# Patient Record
Sex: MALE | Race: WHITE | NOT HISPANIC OR LATINO | Employment: OTHER | URBAN - METROPOLITAN AREA
[De-identification: names, ages, dates, MRNs, and addresses within clinical notes are randomized per-mention and may not be internally consistent; named-entity substitution may affect disease eponyms.]

---

## 2017-01-04 ENCOUNTER — APPOINTMENT (OUTPATIENT)
Dept: CARDIAC REHAB | Facility: HOSPITAL | Age: 62
End: 2017-01-04

## 2017-01-06 ENCOUNTER — APPOINTMENT (OUTPATIENT)
Dept: CARDIAC REHAB | Facility: HOSPITAL | Age: 62
End: 2017-01-06

## 2017-01-09 ENCOUNTER — OFFICE VISIT (OUTPATIENT)
Dept: CARDIAC REHAB | Facility: HOSPITAL | Age: 62
End: 2017-01-09

## 2017-01-09 ENCOUNTER — APPOINTMENT (OUTPATIENT)
Dept: CARDIAC REHAB | Facility: HOSPITAL | Age: 62
End: 2017-01-09

## 2017-01-09 DIAGNOSIS — I21.02 ST ELEVATION MYOCARDIAL INFARCTION INVOLVING LEFT ANTERIOR DESCENDING (LAD) CORONARY ARTERY (HCC): Primary | ICD-10-CM

## 2017-01-11 ENCOUNTER — APPOINTMENT (OUTPATIENT)
Dept: CARDIAC REHAB | Facility: HOSPITAL | Age: 62
End: 2017-01-11

## 2017-01-13 ENCOUNTER — OFFICE VISIT (OUTPATIENT)
Dept: CARDIAC REHAB | Facility: HOSPITAL | Age: 62
End: 2017-01-13

## 2017-01-13 ENCOUNTER — APPOINTMENT (OUTPATIENT)
Dept: CARDIAC REHAB | Facility: HOSPITAL | Age: 62
End: 2017-01-13

## 2017-01-13 DIAGNOSIS — I21.02 ST ELEVATION MYOCARDIAL INFARCTION INVOLVING LEFT ANTERIOR DESCENDING (LAD) CORONARY ARTERY (HCC): Primary | ICD-10-CM

## 2017-01-16 ENCOUNTER — APPOINTMENT (OUTPATIENT)
Dept: CARDIAC REHAB | Facility: HOSPITAL | Age: 62
End: 2017-01-16

## 2017-01-18 ENCOUNTER — OFFICE VISIT (OUTPATIENT)
Dept: CARDIAC REHAB | Facility: HOSPITAL | Age: 62
End: 2017-01-18

## 2017-01-18 ENCOUNTER — APPOINTMENT (OUTPATIENT)
Dept: CARDIAC REHAB | Facility: HOSPITAL | Age: 62
End: 2017-01-18

## 2017-01-18 DIAGNOSIS — I21.02 ST ELEVATION MYOCARDIAL INFARCTION INVOLVING LEFT ANTERIOR DESCENDING (LAD) CORONARY ARTERY (HCC): Primary | ICD-10-CM

## 2017-01-19 RX ORDER — METFORMIN HYDROCHLORIDE 500 MG/1
TABLET, EXTENDED RELEASE ORAL
Qty: 30 TABLET | Refills: 0 | Status: SHIPPED | OUTPATIENT
Start: 2017-01-19 | End: 2017-02-14 | Stop reason: SDUPTHER

## 2017-01-20 ENCOUNTER — APPOINTMENT (OUTPATIENT)
Dept: CARDIAC REHAB | Facility: HOSPITAL | Age: 62
End: 2017-01-20

## 2017-01-23 ENCOUNTER — OFFICE VISIT (OUTPATIENT)
Dept: CARDIAC REHAB | Facility: HOSPITAL | Age: 62
End: 2017-01-23

## 2017-01-23 ENCOUNTER — APPOINTMENT (OUTPATIENT)
Dept: CARDIAC REHAB | Facility: HOSPITAL | Age: 62
End: 2017-01-23

## 2017-01-23 DIAGNOSIS — I21.02 ST ELEVATION MYOCARDIAL INFARCTION INVOLVING LEFT ANTERIOR DESCENDING (LAD) CORONARY ARTERY (HCC): Primary | ICD-10-CM

## 2017-01-25 ENCOUNTER — OFFICE VISIT (OUTPATIENT)
Dept: INTERNAL MEDICINE | Facility: CLINIC | Age: 62
End: 2017-01-25

## 2017-01-25 ENCOUNTER — APPOINTMENT (OUTPATIENT)
Dept: CARDIAC REHAB | Facility: HOSPITAL | Age: 62
End: 2017-01-25

## 2017-01-25 VITALS
TEMPERATURE: 97 F | RESPIRATION RATE: 16 BRPM | SYSTOLIC BLOOD PRESSURE: 94 MMHG | BODY MASS INDEX: 42.23 KG/M2 | DIASTOLIC BLOOD PRESSURE: 68 MMHG | WEIGHT: 286 LBS | HEART RATE: 68 BPM

## 2017-01-25 DIAGNOSIS — E78.2 MIXED HYPERLIPIDEMIA: ICD-10-CM

## 2017-01-25 DIAGNOSIS — N18.2 CHRONIC KIDNEY DISEASE, STAGE 2 (MILD): ICD-10-CM

## 2017-01-25 DIAGNOSIS — I25.10 CORONARY ARTERY DISEASE INVOLVING NATIVE CORONARY ARTERY OF NATIVE HEART WITHOUT ANGINA PECTORIS: ICD-10-CM

## 2017-01-25 DIAGNOSIS — E11.9 TYPE 2 DIABETES MELLITUS WITHOUT COMPLICATION, WITHOUT LONG-TERM CURRENT USE OF INSULIN (HCC): ICD-10-CM

## 2017-01-25 DIAGNOSIS — I50.21 CHF (CONGESTIVE HEART FAILURE), NYHA CLASS I, ACUTE, SYSTOLIC (HCC): Primary | ICD-10-CM

## 2017-01-25 PROCEDURE — 99214 OFFICE O/P EST MOD 30 MIN: CPT | Performed by: FAMILY MEDICINE

## 2017-01-25 NOTE — MR AVS SNAPSHOT
Joshua Wolf   1/25/2017 9:45 AM   Office Visit    Dept Phone:  [phone]   Encounter #:  [id number]    Provider:  Eric Eubanks Jr., MD   Department:  [facility] INTERNAL MEDICINE                Your Full Care Plan              Your Updated Medication List          This list is accurate as of: 1/25/17 10:57 AM.  Always use your most recent med list.                amiodarone 200 MG tablet   Commonly known as:  PACERONE       aspirin 81 MG EC tablet       atorvastatin 40 MG tablet   Commonly known as:  LIPITOR       BRILINTA 90 MG tablet tablet   Generic drug:  ticagrelor       carvedilol 25 MG tablet   Commonly known as:  COREG       furosemide 40 MG tablet   Commonly known as:  LASIX       glimepiride 2 MG tablet   Commonly known as:  AMARYL   Take 1 tablet by mouth every morning before breakfast.       metFORMIN  MG 24 hr tablet   Commonly known as:  GLUCOPHAGE-XR   TAKE 1 TABLET BY MOUTH DAILY WITH BREAKFAST       potassium chloride 20 MEQ CR tablet   Commonly known as:  K-DUR,KLOR-CON       spironolactone 25 MG tablet   Commonly known as:  ALDACTONE       XARELTO 20 MG tablet   Generic drug:  rivaroxaban               You Were Diagnosed With        Codes Comments    CHF (congestive heart failure), NYHA class I, acute, systolic    -  Primary ICD-10-CM: I50.21  ICD-9-CM: 428.21, 428.0     Mixed hyperlipidemia     ICD-10-CM: E78.2  ICD-9-CM: 272.2     Coronary artery disease involving native coronary artery of native heart without angina pectoris     ICD-10-CM: I25.10  ICD-9-CM: 414.01     Type 2 diabetes mellitus without complication, without long-term current use of insulin     ICD-10-CM: E11.9  ICD-9-CM: 250.00     Chronic kidney disease, stage 2 (mild)     ICD-10-CM: N18.2  ICD-9-CM: 585.2       Instructions     None    Patient Instructions History      Upcoming Appointments     Visit Type Date Time Department    OFFICE VISIT 1/25/2017  9:45 AM [facility] 1  4003    PHASE III - CARD 1/25/2017  7:00 AM [facility]    PHASE III - CARD 1/27/2017  9:45 AM [facility]    PHASE III - CARD 1/30/2017  9:45 AM [facility]    PHASE III - CARD 2/1/2017  9:45 AM [facility]    PHASE III - CARD 2/3/2017  9:45 AM [facility]    PHASE III - CARD 2/6/2017  9:45 AM [facility]    PHASE III - CARD 2/8/2017  9:45 AM [facility]    PHASE III - CARD 2/10/2017  9:45 AM [facility]    PHASE III - CARD 2/13/2017  9:45 AM [facility]    PHASE III - CARD 2/15/2017  9:45 AM [facility]    PHASE III - CARD 2/17/2017  9:45 AM [facility]    PHASE III - CARD 2/20/2017  9:45 AM [facility]    PHASE III - CARD 2/22/2017  9:45 AM [facility]    PHASE III - CARD 2/24/2017  9:45 AM [facility]    PHASE III - CARD 2/27/2017  9:45 AM [facility]    PHASE III - CARD 3/1/2017  9:45 AM [facility]    PHASE III - CARD 3/3/2017  9:45 AM [facility]    PHASE III - CARD 3/6/2017  9:45 AM [facility]    PHASE III - CARD 3/8/2017  9:45 AM [facility]    PHASE III - CARD 3/10/2017  9:45 AM [facility]    PHASE III - CARD 3/13/2017  9:45 AM [facility]    PHASE III - CARD 3/15/2017  9:45 AM [facility]    PHASE III - CARD 3/17/2017  9:45 AM [facility]    OFFICE VISIT 7/26/2017 10:00 AM [facility] 1 4003      MyChart Signup     Our records indicate that you have declined [facility] MyChart signup. If you would like to sign up for MyChart, please email [email or web address] or call [phone] to obtain an activation code.             Other Info from Your Visit           Your Appointments     Jan 27, 2017  9:45 AM EST   PHASE III CARDIAC REHAB with GYM - CARDIAC REHAB   [facility] ([facility]    [address] [address]   [phone]            Jan 30, 2017  9:45 AM EST   PHASE III CARDIAC REHAB with GYM - CARDIAC REHAB    [facility] ([place])    [address] [address]   [phone]            Feb 01, 2017  9:45 AM EST   PHASE III CARDIAC REHAB with GYM - CARDIAC REHAB   [facility] ([place])    [address] [address]   [phone]            Feb 03, 2017  9:45 AM EST   PHASE III CARDIAC REHAB with GYM - CARDIAC REHAB   [facility] ([place])    [address] [address]   [phone]            Feb 06, 2017  9:45 AM EST   PHASE III CARDIAC REHAB with GYM - CARDIAC REHAB   [facility]    [address] [address]   [phone]              Allergies     No Known Allergies      Reason for Visit     Hypertension     Hyperlipidemia     Diabetes           Vital Signs     Blood Pressure Pulse Temperature Respirations Weight Body Mass Index    94/68 (BP Location: Left arm, Patient Position: Sitting, Cuff Size: Large Adult) 68 97 °F (36.1 °C) (Tympanic) 16 286 lb (130 kg) 42.23 kg/m2    Smoking Status                   Former Smoker           Problems and Diagnoses Noted     Heart failure    Coronary heart disease    High cholesterol or triglycerides    Type 2 diabetes mellitus without complication    Chronic kidney disease, stage II (mild)

## 2017-01-25 NOTE — PROGRESS NOTES
Subjective   Joshua Wolf is a 61 y.o. male.     Chief Complaint   Patient presents with   • Hypertension   • Hyperlipidemia   • Diabetes         History of Present Illness   Patient is here for recheck with history of type 2 diabetes obesity coronary artery disease with history of class I systolic CHF.  Is been stable on his current medications and feeling pretty good.  His A1c is less than 7.  He seen Dr. Hughes nephrology who is following his creatinine.  Consultation is reviewed.      The following portions of the patient's history were reviewed and updated as appropriate: allergies, current medications, past social history and problem list.    Review of Systems   Constitutional: Negative.    HENT: Negative.    Eyes: Negative.    Respiratory: Negative.    Cardiovascular: Negative.    Gastrointestinal: Negative.    Endocrine: Negative.    Genitourinary: Negative.    Musculoskeletal: Negative.    Skin: Negative.    Allergic/Immunologic: Negative.    Neurological: Negative.    Hematological: Negative.    Psychiatric/Behavioral: Negative.        Objective   Vitals:    01/25/17 1010   BP: 94/68   Pulse: 68   Resp: 16   Temp: 97 °F (36.1 °C)     Physical Exam   Constitutional: He is oriented to person, place, and time. He appears well-developed and well-nourished.   HENT:   Head: Normocephalic and atraumatic.   Right Ear: Tympanic membrane and external ear normal.   Left Ear: Tympanic membrane and external ear normal.   Nose: Nose normal.   Mouth/Throat: Oropharynx is clear and moist.   Eyes: Conjunctivae and EOM are normal. Pupils are equal, round, and reactive to light.   Neck: Normal range of motion. Neck supple. No JVD present. No thyromegaly present.   Cardiovascular: Normal rate, regular rhythm, normal heart sounds and intact distal pulses.    Pulmonary/Chest: Effort normal and breath sounds normal.   Abdominal: Soft. Bowel sounds are normal.   Musculoskeletal: Normal range of motion.   Lymphadenopathy:     He  has no cervical adenopathy.   Neurological: He is alert and oriented to person, place, and time. No cranial nerve deficit. Coordination normal.   Skin: Skin is warm and dry. No rash noted.   Psychiatric: He has a normal mood and affect. His behavior is normal. Judgment and thought content normal.   Vitals reviewed.      Assessment/Plan   Problem List Items Addressed This Visit        Cardiovascular and Mediastinum    CHF (congestive heart failure), NYHA class I - Primary    Hyperlipidemia    Coronary artery disease       Endocrine    Type 2 diabetes mellitus without complication      Other Visit Diagnoses     Chronic kidney disease, stage 2 (mild)           plan: Attempts at weight loss are discussed.  Medications are unchanged we'll see him back in July with repeat lab work.  Continue follow-up with cardiology Dr. figueredo

## 2017-01-27 ENCOUNTER — APPOINTMENT (OUTPATIENT)
Dept: CARDIAC REHAB | Facility: HOSPITAL | Age: 62
End: 2017-01-27

## 2017-01-30 ENCOUNTER — APPOINTMENT (OUTPATIENT)
Dept: CARDIAC REHAB | Facility: HOSPITAL | Age: 62
End: 2017-01-30

## 2017-01-30 ENCOUNTER — OFFICE VISIT (OUTPATIENT)
Dept: CARDIAC REHAB | Facility: HOSPITAL | Age: 62
End: 2017-01-30

## 2017-01-30 DIAGNOSIS — I21.02 ST ELEVATION MYOCARDIAL INFARCTION INVOLVING LEFT ANTERIOR DESCENDING (LAD) CORONARY ARTERY (HCC): Primary | ICD-10-CM

## 2017-02-01 ENCOUNTER — APPOINTMENT (OUTPATIENT)
Dept: CARDIAC REHAB | Facility: HOSPITAL | Age: 62
End: 2017-02-01

## 2017-02-03 ENCOUNTER — APPOINTMENT (OUTPATIENT)
Dept: CARDIAC REHAB | Facility: HOSPITAL | Age: 62
End: 2017-02-03

## 2017-02-06 ENCOUNTER — APPOINTMENT (OUTPATIENT)
Dept: CARDIAC REHAB | Facility: HOSPITAL | Age: 62
End: 2017-02-06

## 2017-02-08 ENCOUNTER — OFFICE VISIT (OUTPATIENT)
Dept: CARDIAC REHAB | Facility: HOSPITAL | Age: 62
End: 2017-02-08

## 2017-02-08 ENCOUNTER — APPOINTMENT (OUTPATIENT)
Dept: CARDIAC REHAB | Facility: HOSPITAL | Age: 62
End: 2017-02-08

## 2017-02-08 DIAGNOSIS — I21.02 ST ELEVATION MYOCARDIAL INFARCTION INVOLVING LEFT ANTERIOR DESCENDING (LAD) CORONARY ARTERY (HCC): Primary | ICD-10-CM

## 2017-02-10 ENCOUNTER — OFFICE VISIT (OUTPATIENT)
Dept: CARDIAC REHAB | Facility: HOSPITAL | Age: 62
End: 2017-02-10

## 2017-02-10 ENCOUNTER — APPOINTMENT (OUTPATIENT)
Dept: CARDIAC REHAB | Facility: HOSPITAL | Age: 62
End: 2017-02-10

## 2017-02-10 DIAGNOSIS — I21.02 ST ELEVATION MYOCARDIAL INFARCTION INVOLVING LEFT ANTERIOR DESCENDING (LAD) CORONARY ARTERY (HCC): Primary | ICD-10-CM

## 2017-02-13 ENCOUNTER — APPOINTMENT (OUTPATIENT)
Dept: CARDIAC REHAB | Facility: HOSPITAL | Age: 62
End: 2017-02-13

## 2017-02-14 RX ORDER — METFORMIN HYDROCHLORIDE 500 MG/1
TABLET, EXTENDED RELEASE ORAL
Qty: 30 TABLET | Refills: 0 | Status: SHIPPED | OUTPATIENT
Start: 2017-02-14 | End: 2017-03-17 | Stop reason: SDUPTHER

## 2017-02-15 ENCOUNTER — APPOINTMENT (OUTPATIENT)
Dept: CARDIAC REHAB | Facility: HOSPITAL | Age: 62
End: 2017-02-15

## 2017-02-17 ENCOUNTER — APPOINTMENT (OUTPATIENT)
Dept: CARDIAC REHAB | Facility: HOSPITAL | Age: 62
End: 2017-02-17

## 2017-02-20 ENCOUNTER — APPOINTMENT (OUTPATIENT)
Dept: CARDIAC REHAB | Facility: HOSPITAL | Age: 62
End: 2017-02-20

## 2017-02-20 ENCOUNTER — OFFICE VISIT (OUTPATIENT)
Dept: CARDIAC REHAB | Facility: HOSPITAL | Age: 62
End: 2017-02-20

## 2017-02-20 DIAGNOSIS — I21.02 ST ELEVATION MYOCARDIAL INFARCTION INVOLVING LEFT ANTERIOR DESCENDING (LAD) CORONARY ARTERY (HCC): Primary | ICD-10-CM

## 2017-02-22 ENCOUNTER — APPOINTMENT (OUTPATIENT)
Dept: CARDIAC REHAB | Facility: HOSPITAL | Age: 62
End: 2017-02-22

## 2017-02-24 ENCOUNTER — APPOINTMENT (OUTPATIENT)
Dept: CARDIAC REHAB | Facility: HOSPITAL | Age: 62
End: 2017-02-24

## 2017-02-27 ENCOUNTER — APPOINTMENT (OUTPATIENT)
Dept: CARDIAC REHAB | Facility: HOSPITAL | Age: 62
End: 2017-02-27

## 2017-03-06 ENCOUNTER — OFFICE VISIT (OUTPATIENT)
Dept: CARDIAC REHAB | Facility: HOSPITAL | Age: 62
End: 2017-03-06

## 2017-03-06 DIAGNOSIS — I21.02 ST ELEVATION MYOCARDIAL INFARCTION INVOLVING LEFT ANTERIOR DESCENDING (LAD) CORONARY ARTERY (HCC): Primary | ICD-10-CM

## 2017-03-12 ENCOUNTER — TRANSCRIBE ORDERS (OUTPATIENT)
Dept: CARDIAC REHAB | Facility: HOSPITAL | Age: 62
End: 2017-03-12

## 2017-03-12 DIAGNOSIS — I21.02 ST ELEVATION MYOCARDIAL INFARCTION INVOLVING LEFT ANTERIOR DESCENDING (LAD) CORONARY ARTERY (HCC): Primary | ICD-10-CM

## 2017-03-17 RX ORDER — METFORMIN HYDROCHLORIDE 500 MG/1
TABLET, EXTENDED RELEASE ORAL
Qty: 30 TABLET | Refills: 0 | Status: SHIPPED | OUTPATIENT
Start: 2017-03-17 | End: 2017-05-08 | Stop reason: SDUPTHER

## 2017-03-20 ENCOUNTER — OFFICE VISIT (OUTPATIENT)
Dept: CARDIAC REHAB | Facility: HOSPITAL | Age: 62
End: 2017-03-20

## 2017-03-20 ENCOUNTER — APPOINTMENT (OUTPATIENT)
Dept: CARDIAC REHAB | Facility: HOSPITAL | Age: 62
End: 2017-03-20

## 2017-03-20 DIAGNOSIS — I21.02 ST ELEVATION MYOCARDIAL INFARCTION INVOLVING LEFT ANTERIOR DESCENDING (LAD) CORONARY ARTERY (HCC): Primary | ICD-10-CM

## 2017-03-29 ENCOUNTER — OFFICE VISIT (OUTPATIENT)
Dept: CARDIAC REHAB | Facility: HOSPITAL | Age: 62
End: 2017-03-29

## 2017-03-29 DIAGNOSIS — I21.02 ST ELEVATION MYOCARDIAL INFARCTION INVOLVING LEFT ANTERIOR DESCENDING (LAD) CORONARY ARTERY (HCC): Primary | ICD-10-CM

## 2017-04-03 ENCOUNTER — OFFICE VISIT (OUTPATIENT)
Dept: CARDIAC REHAB | Facility: HOSPITAL | Age: 62
End: 2017-04-03

## 2017-04-03 DIAGNOSIS — I21.02 ST ELEVATION MYOCARDIAL INFARCTION INVOLVING LEFT ANTERIOR DESCENDING (LAD) CORONARY ARTERY (HCC): Primary | ICD-10-CM

## 2017-04-10 ENCOUNTER — OFFICE VISIT (OUTPATIENT)
Dept: CARDIAC REHAB | Facility: HOSPITAL | Age: 62
End: 2017-04-10

## 2017-04-10 DIAGNOSIS — I21.02 ST ELEVATION MYOCARDIAL INFARCTION INVOLVING LEFT ANTERIOR DESCENDING (LAD) CORONARY ARTERY (HCC): Primary | ICD-10-CM

## 2017-05-08 RX ORDER — METFORMIN HYDROCHLORIDE 500 MG/1
TABLET, EXTENDED RELEASE ORAL
Qty: 30 TABLET | Refills: 5 | Status: SHIPPED | OUTPATIENT
Start: 2017-05-08 | End: 2017-09-04 | Stop reason: DRUGHIGH

## 2017-07-03 ENCOUNTER — APPOINTMENT (OUTPATIENT)
Dept: CARDIAC REHAB | Facility: HOSPITAL | Age: 62
End: 2017-07-03

## 2017-07-05 ENCOUNTER — APPOINTMENT (OUTPATIENT)
Dept: CARDIAC REHAB | Facility: HOSPITAL | Age: 62
End: 2017-07-05

## 2017-07-05 ENCOUNTER — RESULTS ENCOUNTER (OUTPATIENT)
Dept: INTERNAL MEDICINE | Facility: CLINIC | Age: 62
End: 2017-07-05

## 2017-07-05 DIAGNOSIS — E11.9 TYPE 2 DIABETES MELLITUS WITHOUT COMPLICATION, WITHOUT LONG-TERM CURRENT USE OF INSULIN (HCC): ICD-10-CM

## 2017-07-05 DIAGNOSIS — I50.21 CHF (CONGESTIVE HEART FAILURE), NYHA CLASS I, ACUTE, SYSTOLIC (HCC): ICD-10-CM

## 2017-07-05 DIAGNOSIS — N18.2 CHRONIC KIDNEY DISEASE, STAGE 2 (MILD): ICD-10-CM

## 2017-07-05 DIAGNOSIS — I25.10 CORONARY ARTERY DISEASE INVOLVING NATIVE CORONARY ARTERY OF NATIVE HEART WITHOUT ANGINA PECTORIS: ICD-10-CM

## 2017-07-05 DIAGNOSIS — E78.2 MIXED HYPERLIPIDEMIA: ICD-10-CM

## 2017-07-07 ENCOUNTER — APPOINTMENT (OUTPATIENT)
Dept: CARDIAC REHAB | Facility: HOSPITAL | Age: 62
End: 2017-07-07

## 2017-07-10 ENCOUNTER — APPOINTMENT (OUTPATIENT)
Dept: CARDIAC REHAB | Facility: HOSPITAL | Age: 62
End: 2017-07-10

## 2017-07-12 ENCOUNTER — APPOINTMENT (OUTPATIENT)
Dept: CARDIAC REHAB | Facility: HOSPITAL | Age: 62
End: 2017-07-12

## 2017-07-14 ENCOUNTER — APPOINTMENT (OUTPATIENT)
Dept: CARDIAC REHAB | Facility: HOSPITAL | Age: 62
End: 2017-07-14

## 2017-07-17 ENCOUNTER — APPOINTMENT (OUTPATIENT)
Dept: CARDIAC REHAB | Facility: HOSPITAL | Age: 62
End: 2017-07-17

## 2017-07-19 ENCOUNTER — APPOINTMENT (OUTPATIENT)
Dept: CARDIAC REHAB | Facility: HOSPITAL | Age: 62
End: 2017-07-19

## 2017-07-21 ENCOUNTER — APPOINTMENT (OUTPATIENT)
Dept: CARDIAC REHAB | Facility: HOSPITAL | Age: 62
End: 2017-07-21

## 2017-07-24 ENCOUNTER — APPOINTMENT (OUTPATIENT)
Dept: CARDIAC REHAB | Facility: HOSPITAL | Age: 62
End: 2017-07-24

## 2017-07-26 ENCOUNTER — APPOINTMENT (OUTPATIENT)
Dept: CARDIAC REHAB | Facility: HOSPITAL | Age: 62
End: 2017-07-26

## 2017-07-28 ENCOUNTER — APPOINTMENT (OUTPATIENT)
Dept: CARDIAC REHAB | Facility: HOSPITAL | Age: 62
End: 2017-07-28

## 2017-07-31 ENCOUNTER — APPOINTMENT (OUTPATIENT)
Dept: CARDIAC REHAB | Facility: HOSPITAL | Age: 62
End: 2017-07-31

## 2017-08-02 ENCOUNTER — APPOINTMENT (OUTPATIENT)
Dept: CARDIAC REHAB | Facility: HOSPITAL | Age: 62
End: 2017-08-02

## 2017-08-04 ENCOUNTER — APPOINTMENT (OUTPATIENT)
Dept: CARDIAC REHAB | Facility: HOSPITAL | Age: 62
End: 2017-08-04

## 2017-08-07 ENCOUNTER — APPOINTMENT (OUTPATIENT)
Dept: CARDIAC REHAB | Facility: HOSPITAL | Age: 62
End: 2017-08-07

## 2017-08-09 ENCOUNTER — APPOINTMENT (OUTPATIENT)
Dept: CARDIAC REHAB | Facility: HOSPITAL | Age: 62
End: 2017-08-09

## 2017-08-11 ENCOUNTER — APPOINTMENT (OUTPATIENT)
Dept: CARDIAC REHAB | Facility: HOSPITAL | Age: 62
End: 2017-08-11

## 2017-08-14 ENCOUNTER — APPOINTMENT (OUTPATIENT)
Dept: CARDIAC REHAB | Facility: HOSPITAL | Age: 62
End: 2017-08-14

## 2017-08-16 ENCOUNTER — APPOINTMENT (OUTPATIENT)
Dept: CARDIAC REHAB | Facility: HOSPITAL | Age: 62
End: 2017-08-16

## 2017-08-18 ENCOUNTER — APPOINTMENT (OUTPATIENT)
Dept: CARDIAC REHAB | Facility: HOSPITAL | Age: 62
End: 2017-08-18

## 2017-08-21 ENCOUNTER — APPOINTMENT (OUTPATIENT)
Dept: CARDIAC REHAB | Facility: HOSPITAL | Age: 62
End: 2017-08-21

## 2017-08-22 ENCOUNTER — OFFICE VISIT (OUTPATIENT)
Dept: INTERNAL MEDICINE | Facility: CLINIC | Age: 62
End: 2017-08-22

## 2017-08-22 VITALS
DIASTOLIC BLOOD PRESSURE: 72 MMHG | SYSTOLIC BLOOD PRESSURE: 108 MMHG | BODY MASS INDEX: 42.23 KG/M2 | OXYGEN SATURATION: 98 % | WEIGHT: 286 LBS | TEMPERATURE: 97 F | HEART RATE: 70 BPM

## 2017-08-22 DIAGNOSIS — I21.09 ACUTE ANTERIOR WALL MI (HCC): ICD-10-CM

## 2017-08-22 DIAGNOSIS — I50.20 CHF (CONGESTIVE HEART FAILURE), NYHA CLASS I, UNSPECIFIED FAILURE CHRONICITY, SYSTOLIC (HCC): Primary | ICD-10-CM

## 2017-08-22 DIAGNOSIS — E11.9 TYPE 2 DIABETES MELLITUS WITHOUT COMPLICATION, WITHOUT LONG-TERM CURRENT USE OF INSULIN (HCC): ICD-10-CM

## 2017-08-22 DIAGNOSIS — E78.2 MIXED HYPERLIPIDEMIA: ICD-10-CM

## 2017-08-22 PROCEDURE — 99214 OFFICE O/P EST MOD 30 MIN: CPT | Performed by: FAMILY MEDICINE

## 2017-08-22 RX ORDER — CLOPIDOGREL BISULFATE 75 MG/1
TABLET ORAL
COMMUNITY
Start: 2017-08-14 | End: 2017-11-01 | Stop reason: HOSPADM

## 2017-08-22 NOTE — PROGRESS NOTES
Subjective   Joshua Wolf is a 62 y.o. male.     Chief Complaint   Patient presents with   • Coronary Artery Disease   • Congestive Heart Failure   • Hypertension   • Diabetes         History of Present Illness   Patient's here for recheck of her 2 diabetes coronary disease, S1 systolic CHF he's been followed by Dr. Hughes nephrology for his creatinine.  Otherwise she sees Dr. Cornelius cardiologist.  He is due for lab work.  I reviewed chest x-ray from the [facility] and he has elevation of the right hemidiaphragm which may or may not be chronic.  He is asymptomatic other than the fact that he had some shortness of air with exercise which got better for switch to Plavix from Brilinta    The following portions of the patient's history were reviewed and updated as appropriate: allergies, current medications, past social history and problem list.    Review of Systems   Constitutional: Negative.    HENT: Negative.    Eyes: Negative.    Respiratory: Positive for shortness of breath.    Cardiovascular: Negative.    Gastrointestinal: Negative.    Endocrine: Negative.    Genitourinary: Negative.    Musculoskeletal: Negative.    Skin: Negative.    Allergic/Immunologic: Negative.    Neurological: Negative.    Hematological: Negative.    Psychiatric/Behavioral: Negative.        Objective   Vitals:    08/22/17 1447   BP: 108/72   Pulse: 70   Temp: 97 °F (36.1 °C)   SpO2: 98%     Physical Exam   Constitutional: He is oriented to person, place, and time. He appears well-developed and well-nourished.   HENT:   Head: Normocephalic and atraumatic.   Right Ear: Tympanic membrane and external ear normal.   Left Ear: Tympanic membrane and external ear normal.   Nose: Nose normal.   Mouth/Throat: Oropharynx is clear and moist.   Eyes: Conjunctivae and EOM are normal. Pupils are equal, round, and reactive to light.   Neck: Normal range of motion. Neck supple. No JVD present. No thyromegaly present.   Cardiovascular: Normal rate,  regular rhythm, normal heart sounds and intact distal pulses.    Pulmonary/Chest: Effort normal and breath sounds normal.   Abdominal: Soft. Bowel sounds are normal.   Musculoskeletal: Normal range of motion.   Lymphadenopathy:     He has no cervical adenopathy.   Neurological: He is alert and oriented to person, place, and time. No cranial nerve deficit. Coordination normal.   Skin: Skin is warm and dry. No rash noted.   Psychiatric: He has a normal mood and affect. His behavior is normal. Judgment and thought content normal.   Vitals reviewed.      Assessment/Plan   Problem List Items Addressed This Visit        Cardiovascular and Mediastinum    Hyperlipidemia    Relevant Orders    Comprehensive Metabolic Panel    CBC & Differential    TSH    Hemoglobin A1c    Lipid Panel With / Chol / HDL Ratio    Acute anterior wall MI    Relevant Medications    clopidogrel (PLAVIX) 75 MG tablet    Other Relevant Orders    Comprehensive Metabolic Panel    CBC & Differential    TSH    Hemoglobin A1c    Lipid Panel With / Chol / HDL Ratio    CHF (congestive heart failure), NYHA class I - Primary    Relevant Medications    clopidogrel (PLAVIX) 75 MG tablet    Other Relevant Orders    Comprehensive Metabolic Panel    CBC & Differential    TSH    Hemoglobin A1c    Lipid Panel With / Chol / HDL Ratio       Endocrine    Type 2 diabetes mellitus without complication    Relevant Orders    Comprehensive Metabolic Panel    CBC & Differential    TSH    Hemoglobin A1c    Lipid Panel With / Chol / HDL Ratio      Plan: Meds remain the same we'll get screening labs today recheck in 6 months follow-up with cardiology.  I placed a call the cardiology office about the appearance of the chest x-ray.

## 2017-08-23 ENCOUNTER — APPOINTMENT (OUTPATIENT)
Dept: CARDIAC REHAB | Facility: HOSPITAL | Age: 62
End: 2017-08-23

## 2017-08-23 LAB
ALBUMIN SERPL-MCNC: 4.2 G/DL (ref 3.5–5.2)
ALBUMIN/GLOB SERPL: 1.6 G/DL
ALP SERPL-CCNC: 89 U/L (ref 39–117)
ALT SERPL-CCNC: 21 U/L (ref 1–41)
AST SERPL-CCNC: 14 U/L (ref 1–40)
BASOPHILS # BLD AUTO: 0.04 10*3/MM3 (ref 0–0.2)
BASOPHILS NFR BLD AUTO: 0.5 % (ref 0–1.5)
BILIRUB SERPL-MCNC: 1 MG/DL (ref 0.1–1.2)
BUN SERPL-MCNC: 31 MG/DL (ref 8–23)
BUN/CREAT SERPL: 17.8 (ref 7–25)
CALCIUM SERPL-MCNC: 9.4 MG/DL (ref 8.6–10.5)
CHLORIDE SERPL-SCNC: 98 MMOL/L (ref 98–107)
CHOLEST SERPL-MCNC: 166 MG/DL (ref 0–200)
CHOLEST/HDLC SERPL: 4.26 {RATIO}
CO2 SERPL-SCNC: 26.5 MMOL/L (ref 22–29)
CREAT SERPL-MCNC: 1.74 MG/DL (ref 0.76–1.27)
EOSINOPHIL # BLD AUTO: 0.31 10*3/MM3 (ref 0–0.7)
EOSINOPHIL NFR BLD AUTO: 4.1 % (ref 0.3–6.2)
ERYTHROCYTE [DISTWIDTH] IN BLOOD BY AUTOMATED COUNT: 13.3 % (ref 11.5–14.5)
GLOBULIN SER CALC-MCNC: 2.6 GM/DL
GLUCOSE SERPL-MCNC: 408 MG/DL (ref 65–99)
HBA1C MFR BLD: 10.2 % (ref 4.8–5.6)
HCT VFR BLD AUTO: 43.1 % (ref 40.4–52.2)
HDLC SERPL-MCNC: 39 MG/DL (ref 40–60)
HGB BLD-MCNC: 14.4 G/DL (ref 13.7–17.6)
IMM GRANULOCYTES # BLD: 0.06 10*3/MM3 (ref 0–0.03)
IMM GRANULOCYTES NFR BLD: 0.8 % (ref 0–0.5)
LDLC SERPL CALC-MCNC: 89 MG/DL (ref 0–100)
LYMPHOCYTES # BLD AUTO: 1.05 10*3/MM3 (ref 0.9–4.8)
LYMPHOCYTES NFR BLD AUTO: 13.8 % (ref 19.6–45.3)
MCH RBC QN AUTO: 31.1 PG (ref 27–32.7)
MCHC RBC AUTO-ENTMCNC: 33.4 G/DL (ref 32.6–36.4)
MCV RBC AUTO: 93.1 FL (ref 79.8–96.2)
MONOCYTES # BLD AUTO: 0.71 10*3/MM3 (ref 0.2–1.2)
MONOCYTES NFR BLD AUTO: 9.3 % (ref 5–12)
NEUTROPHILS # BLD AUTO: 5.45 10*3/MM3 (ref 1.9–8.1)
NEUTROPHILS NFR BLD AUTO: 71.5 % (ref 42.7–76)
PLATELET # BLD AUTO: 173 10*3/MM3 (ref 140–500)
POTASSIUM SERPL-SCNC: 4.9 MMOL/L (ref 3.5–5.2)
PROT SERPL-MCNC: 6.8 G/DL (ref 6–8.5)
RBC # BLD AUTO: 4.63 10*6/MM3 (ref 4.6–6)
SODIUM SERPL-SCNC: 138 MMOL/L (ref 136–145)
TRIGL SERPL-MCNC: 192 MG/DL (ref 0–150)
TSH SERPL DL<=0.005 MIU/L-ACNC: 2.82 MIU/ML (ref 0.27–4.2)
VLDLC SERPL CALC-MCNC: 38.4 MG/DL (ref 5–40)
WBC # BLD AUTO: 7.62 10*3/MM3 (ref 4.5–10.7)

## 2017-08-25 ENCOUNTER — APPOINTMENT (OUTPATIENT)
Dept: CARDIAC REHAB | Facility: HOSPITAL | Age: 62
End: 2017-08-25

## 2017-08-28 ENCOUNTER — APPOINTMENT (OUTPATIENT)
Dept: CARDIAC REHAB | Facility: HOSPITAL | Age: 62
End: 2017-08-28

## 2017-08-30 ENCOUNTER — APPOINTMENT (OUTPATIENT)
Dept: CARDIAC REHAB | Facility: HOSPITAL | Age: 62
End: 2017-08-30

## 2017-09-01 ENCOUNTER — APPOINTMENT (OUTPATIENT)
Dept: CARDIAC REHAB | Facility: HOSPITAL | Age: 62
End: 2017-09-01

## 2017-09-04 DIAGNOSIS — IMO0001 UNCONTROLLED TYPE 2 DIABETES MELLITUS WITHOUT COMPLICATION, WITHOUT LONG-TERM CURRENT USE OF INSULIN: ICD-10-CM

## 2017-09-04 DIAGNOSIS — E11.9 CONTROLLED TYPE 2 DIABETES MELLITUS WITHOUT COMPLICATION, WITHOUT LONG-TERM CURRENT USE OF INSULIN (HCC): Primary | ICD-10-CM

## 2017-09-04 RX ORDER — GLIMEPIRIDE 4 MG/1
4 TABLET ORAL
Qty: 90 TABLET | Refills: 1 | Status: SHIPPED | OUTPATIENT
Start: 2017-09-04 | End: 2017-12-11 | Stop reason: SDUPTHER

## 2017-09-04 RX ORDER — METFORMIN HYDROCHLORIDE EXTENDED-RELEASE TABLETS 1000 MG/1
1000 TABLET, FILM COATED, EXTENDED RELEASE ORAL
Qty: 90 TABLET | Refills: 1 | Status: SHIPPED | OUTPATIENT
Start: 2017-09-04 | End: 2017-11-10 | Stop reason: DRUGHIGH

## 2017-09-06 ENCOUNTER — APPOINTMENT (OUTPATIENT)
Dept: CARDIAC REHAB | Facility: HOSPITAL | Age: 62
End: 2017-09-06

## 2017-09-08 ENCOUNTER — APPOINTMENT (OUTPATIENT)
Dept: CARDIAC REHAB | Facility: HOSPITAL | Age: 62
End: 2017-09-08

## 2017-09-11 ENCOUNTER — APPOINTMENT (OUTPATIENT)
Dept: CARDIAC REHAB | Facility: HOSPITAL | Age: 62
End: 2017-09-11

## 2017-09-13 ENCOUNTER — APPOINTMENT (OUTPATIENT)
Dept: CARDIAC REHAB | Facility: HOSPITAL | Age: 62
End: 2017-09-13

## 2017-09-15 ENCOUNTER — APPOINTMENT (OUTPATIENT)
Dept: CARDIAC REHAB | Facility: HOSPITAL | Age: 62
End: 2017-09-15

## 2017-10-30 ENCOUNTER — APPOINTMENT (OUTPATIENT)
Dept: GENERAL RADIOLOGY | Facility: HOSPITAL | Age: 62
End: 2017-10-30

## 2017-10-30 ENCOUNTER — HOSPITAL ENCOUNTER (INPATIENT)
Facility: HOSPITAL | Age: 62
LOS: 2 days | Discharge: HOME | End: 2017-11-01
Attending: EMERGENCY MEDICINE | Admitting: INTERNAL MEDICINE

## 2017-10-30 ENCOUNTER — APPOINTMENT (OUTPATIENT)
Dept: CARDIOLOGY | Facility: HOSPITAL | Age: 62
End: 2017-10-30
Attending: INTERNAL MEDICINE

## 2017-10-30 DIAGNOSIS — I21.3 ST ELEVATION MYOCARDIAL INFARCTION (STEMI), UNSPECIFIED ARTERY (HCC): Primary | ICD-10-CM

## 2017-10-30 DIAGNOSIS — I49.01 VENTRICULAR FIBRILLATION (HCC): ICD-10-CM

## 2017-10-30 PROBLEM — I21.09 ANTERIOR MYOCARDIAL INFARCTION (HCC): Status: ACTIVE | Noted: 2017-10-30

## 2017-10-30 LAB
ACT BLD: 274 SECONDS (ref 82–152)
ACT BLD: 324 SECONDS (ref 82–152)
ALBUMIN SERPL-MCNC: 4.1 G/DL (ref 3.5–5.2)
ALBUMIN/GLOB SERPL: 1.3 G/DL
ALP SERPL-CCNC: 71 U/L (ref 39–117)
ALT SERPL W P-5'-P-CCNC: 40 U/L (ref 1–41)
ANION GAP SERPL CALCULATED.3IONS-SCNC: 12.3 MMOL/L
ANION GAP SERPL CALCULATED.3IONS-SCNC: 14.2 MMOL/L
ANION GAP SERPL CALCULATED.3IONS-SCNC: 19.2 MMOL/L
AORTIC ARCH: 2.5 CM
ASCENDING AORTA: 3.9 CM
AST SERPL-CCNC: 34 U/L (ref 1–40)
BASOPHILS # BLD AUTO: 0.04 10*3/MM3 (ref 0–0.2)
BASOPHILS NFR BLD AUTO: 0.3 % (ref 0–1.5)
BH CV ECHO MEAS - ACS: 2.2 CM
BH CV ECHO MEAS - AO MAX PG (FULL): 0.46 MMHG
BH CV ECHO MEAS - AO MAX PG: 2.1 MMHG
BH CV ECHO MEAS - AO MEAN PG (FULL): 0 MMHG
BH CV ECHO MEAS - AO MEAN PG: 1 MMHG
BH CV ECHO MEAS - AO ROOT AREA (BSA CORRECTED): 1.5
BH CV ECHO MEAS - AO ROOT AREA: 10.8 CM^2
BH CV ECHO MEAS - AO ROOT DIAM: 3.7 CM
BH CV ECHO MEAS - AO V2 MAX: 73.3 CM/SEC
BH CV ECHO MEAS - AO V2 MEAN: 52.9 CM/SEC
BH CV ECHO MEAS - AO V2 VTI: 17.1 CM
BH CV ECHO MEAS - ASC AORTA: 3.9 CM
BH CV ECHO MEAS - AVA(I,A): 3 CM^2
BH CV ECHO MEAS - AVA(I,D): 3 CM^2
BH CV ECHO MEAS - AVA(V,A): 3.4 CM^2
BH CV ECHO MEAS - AVA(V,D): 3.4 CM^2
BH CV ECHO MEAS - BSA(HAYCOCK): 2.6 M^2
BH CV ECHO MEAS - BSA: 2.4 M^2
BH CV ECHO MEAS - BZI_BMI: 41.6 KILOGRAMS/M^2
BH CV ECHO MEAS - BZI_METRIC_HEIGHT: 175.3 CM
BH CV ECHO MEAS - BZI_METRIC_WEIGHT: 127.9 KG
BH CV ECHO MEAS - CONTRAST EF (2CH): 46 ML/M^2
BH CV ECHO MEAS - CONTRAST EF 4CH: 37 ML/M^2
BH CV ECHO MEAS - EDV(CUBED): 110.6 ML
BH CV ECHO MEAS - EDV(MOD-SP2): 137 ML
BH CV ECHO MEAS - EDV(MOD-SP4): 154 ML
BH CV ECHO MEAS - EDV(TEICH): 107.5 ML
BH CV ECHO MEAS - EF(CUBED): 64.5 %
BH CV ECHO MEAS - EF(MOD-SP2): 46 %
BH CV ECHO MEAS - EF(MOD-SP4): 37 %
BH CV ECHO MEAS - EF(TEICH): 55.9 %
BH CV ECHO MEAS - ESV(CUBED): 39.3 ML
BH CV ECHO MEAS - ESV(MOD-SP2): 74 ML
BH CV ECHO MEAS - ESV(MOD-SP4): 97 ML
BH CV ECHO MEAS - ESV(TEICH): 47.4 ML
BH CV ECHO MEAS - FS: 29.2 %
BH CV ECHO MEAS - IVS/LVPW: 1.1
BH CV ECHO MEAS - IVSD: 1.2 CM
BH CV ECHO MEAS - LAT PEAK E' VEL: 5 CM/SEC
BH CV ECHO MEAS - LV DIASTOLIC VOL/BSA (35-75): 64.4 ML/M^2
BH CV ECHO MEAS - LV MASS(C)D: 206.4 GRAMS
BH CV ECHO MEAS - LV MASS(C)DI: 86.3 GRAMS/M^2
BH CV ECHO MEAS - LV MAX PG: 1.7 MMHG
BH CV ECHO MEAS - LV MEAN PG: 1 MMHG
BH CV ECHO MEAS - LV SYSTOLIC VOL/BSA (12-30): 40.6 ML/M^2
BH CV ECHO MEAS - LV V1 MAX: 65 CM/SEC
BH CV ECHO MEAS - LV V1 MEAN: 47.1 CM/SEC
BH CV ECHO MEAS - LV V1 VTI: 13.5 CM
BH CV ECHO MEAS - LVIDD: 4.8 CM
BH CV ECHO MEAS - LVIDS: 3.4 CM
BH CV ECHO MEAS - LVLD AP2: 9.1 CM
BH CV ECHO MEAS - LVLD AP4: 9.3 CM
BH CV ECHO MEAS - LVLS AP2: 8.4 CM
BH CV ECHO MEAS - LVLS AP4: 8.5 CM
BH CV ECHO MEAS - LVOT AREA (M): 3.8 CM^2
BH CV ECHO MEAS - LVOT AREA: 3.8 CM^2
BH CV ECHO MEAS - LVOT DIAM: 2.2 CM
BH CV ECHO MEAS - LVPWD: 1.1 CM
BH CV ECHO MEAS - MED PEAK E' VEL: 5 CM/SEC
BH CV ECHO MEAS - MV A DUR: 0.14 SEC
BH CV ECHO MEAS - MV A MAX VEL: 69.4 CM/SEC
BH CV ECHO MEAS - MV DEC SLOPE: 404 CM/SEC^2
BH CV ECHO MEAS - MV DEC TIME: 0.14 SEC
BH CV ECHO MEAS - MV E MAX VEL: 43.7 CM/SEC
BH CV ECHO MEAS - MV E/A: 0.63
BH CV ECHO MEAS - MV MAX PG: 2.1 MMHG
BH CV ECHO MEAS - MV MEAN PG: 1 MMHG
BH CV ECHO MEAS - MV P1/2T MAX VEL: 59.1 CM/SEC
BH CV ECHO MEAS - MV P1/2T: 42.8 MSEC
BH CV ECHO MEAS - MV V2 MAX: 73.3 CM/SEC
BH CV ECHO MEAS - MV V2 MEAN: 49.3 CM/SEC
BH CV ECHO MEAS - MV V2 VTI: 16.3 CM
BH CV ECHO MEAS - MVA P1/2T LCG: 3.7 CM^2
BH CV ECHO MEAS - MVA(P1/2T): 5.1 CM^2
BH CV ECHO MEAS - MVA(VTI): 3.1 CM^2
BH CV ECHO MEAS - PA ACC TIME: 0.07 SEC
BH CV ECHO MEAS - PA MAX PG (FULL): 2.9 MMHG
BH CV ECHO MEAS - PA MAX PG: 4.2 MMHG
BH CV ECHO MEAS - PA PR(ACCEL): 45.7 MMHG
BH CV ECHO MEAS - PA V2 MAX: 102 CM/SEC
BH CV ECHO MEAS - PULM A REVS DUR: 0.06 SEC
BH CV ECHO MEAS - PULM A REVS VEL: 44.1 CM/SEC
BH CV ECHO MEAS - PULM DIAS VEL: 68.9 CM/SEC
BH CV ECHO MEAS - PULM S/D: 0.67
BH CV ECHO MEAS - PULM SYS VEL: 46.3 CM/SEC
BH CV ECHO MEAS - PVA(V,A): 2.5 CM^2
BH CV ECHO MEAS - PVA(V,D): 2.5 CM^2
BH CV ECHO MEAS - QP/QS: 1.1
BH CV ECHO MEAS - RV MAX PG: 1.3 MMHG
BH CV ECHO MEAS - RV MEAN PG: 1 MMHG
BH CV ECHO MEAS - RV V1 MAX: 56.2 CM/SEC
BH CV ECHO MEAS - RV V1 MEAN: 36.3 CM/SEC
BH CV ECHO MEAS - RV V1 VTI: 12.9 CM
BH CV ECHO MEAS - RVOT AREA: 4.5 CM^2
BH CV ECHO MEAS - RVOT DIAM: 2.4 CM
BH CV ECHO MEAS - SI(AO): 76.9 ML/M^2
BH CV ECHO MEAS - SI(CUBED): 29.8 ML/M^2
BH CV ECHO MEAS - SI(LVOT): 21.5 ML/M^2
BH CV ECHO MEAS - SI(MOD-SP2): 26.4 ML/M^2
BH CV ECHO MEAS - SI(MOD-SP4): 23.8 ML/M^2
BH CV ECHO MEAS - SI(TEICH): 25.1 ML/M^2
BH CV ECHO MEAS - SV(AO): 183.9 ML
BH CV ECHO MEAS - SV(CUBED): 71.3 ML
BH CV ECHO MEAS - SV(LVOT): 51.3 ML
BH CV ECHO MEAS - SV(MOD-SP2): 63 ML
BH CV ECHO MEAS - SV(MOD-SP4): 57 ML
BH CV ECHO MEAS - SV(RVOT): 58.4 ML
BH CV ECHO MEAS - SV(TEICH): 60.1 ML
BH CV ECHO MEAS - TAPSE (>1.6): 1.8 CM2
BH CV VAS BP RIGHT ARM: NORMAL MMHG
BH CV XLRA - RV BASE: 2.9 CM
BH CV XLRA - TDI S': 9 CM/SEC
BILIRUB SERPL-MCNC: 1.1 MG/DL (ref 0.1–1.2)
BUN BLD-MCNC: 25 MG/DL (ref 8–23)
BUN BLD-MCNC: 26 MG/DL (ref 8–23)
BUN BLD-MCNC: 27 MG/DL (ref 8–23)
BUN/CREAT SERPL: 15 (ref 7–25)
BUN/CREAT SERPL: 15.7 (ref 7–25)
BUN/CREAT SERPL: 17.1 (ref 7–25)
BURR CELLS BLD QL SMEAR: NORMAL
CALCIUM SPEC-SCNC: 8.5 MG/DL (ref 8.6–10.5)
CALCIUM SPEC-SCNC: 9 MG/DL (ref 8.6–10.5)
CALCIUM SPEC-SCNC: 9.3 MG/DL (ref 8.6–10.5)
CHLORIDE SERPL-SCNC: 102 MMOL/L (ref 98–107)
CHLORIDE SERPL-SCNC: 98 MMOL/L (ref 98–107)
CHLORIDE SERPL-SCNC: 99 MMOL/L (ref 98–107)
CHOLEST SERPL-MCNC: 166 MG/DL (ref 0–200)
CLOSE TME COLL+ADP + EPINEP PNL BLD: 42 % (ref 86–100)
CO2 SERPL-SCNC: 20.8 MMOL/L (ref 22–29)
CO2 SERPL-SCNC: 22.7 MMOL/L (ref 22–29)
CO2 SERPL-SCNC: 24.8 MMOL/L (ref 22–29)
CREAT BLD-MCNC: 1.46 MG/DL (ref 0.76–1.27)
CREAT BLD-MCNC: 1.66 MG/DL (ref 0.76–1.27)
CREAT BLD-MCNC: 1.8 MG/DL (ref 0.76–1.27)
DEPRECATED RDW RBC AUTO: 44.5 FL (ref 37–54)
DEPRECATED RDW RBC AUTO: 45.3 FL (ref 37–54)
E/E' RATIO: 9
EOSINOPHIL # BLD AUTO: 0.31 10*3/MM3 (ref 0–0.7)
EOSINOPHIL NFR BLD AUTO: 2.2 % (ref 0.3–6.2)
ERYTHROCYTE [DISTWIDTH] IN BLOOD BY AUTOMATED COUNT: 13.3 % (ref 11.5–14.5)
ERYTHROCYTE [DISTWIDTH] IN BLOOD BY AUTOMATED COUNT: 13.4 % (ref 11.5–14.5)
GFR SERPL CREATININE-BSD FRML MDRD: 39 ML/MIN/1.73
GFR SERPL CREATININE-BSD FRML MDRD: 42 ML/MIN/1.73
GFR SERPL CREATININE-BSD FRML MDRD: 49 ML/MIN/1.73
GLOBULIN UR ELPH-MCNC: 3.1 GM/DL
GLUCOSE BLD-MCNC: 267 MG/DL (ref 65–99)
GLUCOSE BLD-MCNC: 300 MG/DL (ref 65–99)
GLUCOSE BLD-MCNC: 302 MG/DL (ref 65–99)
GLUCOSE BLDC GLUCOMTR-MCNC: 240 MG/DL (ref 70–130)
GLUCOSE BLDC GLUCOMTR-MCNC: 246 MG/DL (ref 70–130)
GLUCOSE BLDC GLUCOMTR-MCNC: 264 MG/DL (ref 70–130)
GLUCOSE BLDC GLUCOMTR-MCNC: 270 MG/DL (ref 70–130)
GLUCOSE BLDC GLUCOMTR-MCNC: 276 MG/DL (ref 70–130)
GLUCOSE BLDC GLUCOMTR-MCNC: 289 MG/DL (ref 70–130)
HBA1C MFR BLD: 8.8 % (ref 4.8–5.6)
HCT VFR BLD AUTO: 41.2 % (ref 40.4–52.2)
HCT VFR BLD AUTO: 47.2 % (ref 40.4–52.2)
HDLC SERPL-MCNC: 40 MG/DL (ref 40–60)
HGB BLD-MCNC: 14 G/DL (ref 13.7–17.6)
HGB BLD-MCNC: 16 G/DL (ref 13.7–17.6)
HOLD SPECIMEN: NORMAL
HOLD SPECIMEN: NORMAL
IMM GRANULOCYTES # BLD: 0.12 10*3/MM3 (ref 0–0.03)
IMM GRANULOCYTES NFR BLD: 0.9 % (ref 0–0.5)
LARGE PLATELETS: NORMAL
LDLC SERPL CALC-MCNC: 115 MG/DL (ref 0–100)
LDLC/HDLC SERPL: 2.87 {RATIO}
LEFT ATRIUM VOLUME INDEX: 13 ML/M2
LYMPHOCYTES # BLD AUTO: 2.24 10*3/MM3 (ref 0.9–4.8)
LYMPHOCYTES NFR BLD AUTO: 16 % (ref 19.6–45.3)
MCH RBC QN AUTO: 31.4 PG (ref 27–32.7)
MCH RBC QN AUTO: 31.5 PG (ref 27–32.7)
MCHC RBC AUTO-ENTMCNC: 33.9 G/DL (ref 32.6–36.4)
MCHC RBC AUTO-ENTMCNC: 34 G/DL (ref 32.6–36.4)
MCV RBC AUTO: 92.5 FL (ref 79.8–96.2)
MCV RBC AUTO: 92.8 FL (ref 79.8–96.2)
MONOCYTES # BLD AUTO: 1.08 10*3/MM3 (ref 0.2–1.2)
MONOCYTES NFR BLD AUTO: 7.7 % (ref 5–12)
NEUTROPHILS # BLD AUTO: 10.24 10*3/MM3 (ref 1.9–8.1)
NEUTROPHILS NFR BLD AUTO: 72.9 % (ref 42.7–76)
NRBC BLD MANUAL-RTO: 0.4 /100 WBC (ref 0–0)
PLATELET # BLD AUTO: 158 10*3/MM3 (ref 140–500)
PLATELET # BLD AUTO: 219 10*3/MM3 (ref 140–500)
PMV BLD AUTO: 11.3 FL (ref 6–12)
PMV BLD AUTO: 11.7 FL (ref 6–12)
POTASSIUM BLD-SCNC: 4 MMOL/L (ref 3.5–5.2)
POTASSIUM BLD-SCNC: 4.6 MMOL/L (ref 3.5–5.2)
POTASSIUM BLD-SCNC: 5.6 MMOL/L (ref 3.5–5.2)
PROT SERPL-MCNC: 7.2 G/DL (ref 6–8.5)
RBC # BLD AUTO: 4.44 10*6/MM3 (ref 4.6–6)
RBC # BLD AUTO: 5.1 10*6/MM3 (ref 4.6–6)
SODIUM BLD-SCNC: 137 MMOL/L (ref 136–145)
SODIUM BLD-SCNC: 137 MMOL/L (ref 136–145)
SODIUM BLD-SCNC: 139 MMOL/L (ref 136–145)
TRIGL SERPL-MCNC: 57 MG/DL (ref 0–150)
TROPONIN T SERPL-MCNC: <0.01 NG/ML (ref 0–0.03)
VLDLC SERPL-MCNC: 11.4 MG/DL (ref 5–40)
WBC MORPH BLD: NORMAL
WBC NRBC COR # BLD: 13.24 10*3/MM3 (ref 4.5–10.7)
WBC NRBC COR # BLD: 14.03 10*3/MM3 (ref 4.5–10.7)
WHOLE BLOOD HOLD SPECIMEN: NORMAL
WHOLE BLOOD HOLD SPECIMEN: NORMAL

## 2017-10-30 PROCEDURE — C1757 CATH, THROMBECTOMY/EMBOLECT: HCPCS | Performed by: INTERNAL MEDICINE

## 2017-10-30 PROCEDURE — 25010000002 ABCIXIMAB PER 10 MG: Performed by: INTERNAL MEDICINE

## 2017-10-30 PROCEDURE — 85025 COMPLETE CBC W/AUTO DIFF WBC: CPT | Performed by: EMERGENCY MEDICINE

## 2017-10-30 PROCEDURE — C1887 CATHETER, GUIDING: HCPCS | Performed by: INTERNAL MEDICINE

## 2017-10-30 PROCEDURE — 93005 ELECTROCARDIOGRAM TRACING: CPT | Performed by: INTERNAL MEDICINE

## 2017-10-30 PROCEDURE — 93458 L HRT ARTERY/VENTRICLE ANGIO: CPT | Performed by: INTERNAL MEDICINE

## 2017-10-30 PROCEDURE — 99152 MOD SED SAME PHYS/QHP 5/>YRS: CPT | Performed by: INTERNAL MEDICINE

## 2017-10-30 PROCEDURE — 25010000002 MAGNESIUM SULFATE IN D5W 1G/100ML (PREMIX) 1-5 GM/100ML-% SOLUTION: Performed by: EMERGENCY MEDICINE

## 2017-10-30 PROCEDURE — 85027 COMPLETE CBC AUTOMATED: CPT | Performed by: INTERNAL MEDICINE

## 2017-10-30 PROCEDURE — 0 IOPAMIDOL PER 1 ML: Performed by: INTERNAL MEDICINE

## 2017-10-30 PROCEDURE — 25010000002 ONDANSETRON PER 1 MG: Performed by: EMERGENCY MEDICINE

## 2017-10-30 PROCEDURE — C1894 INTRO/SHEATH, NON-LASER: HCPCS | Performed by: INTERNAL MEDICINE

## 2017-10-30 PROCEDURE — C1769 GUIDE WIRE: HCPCS | Performed by: INTERNAL MEDICINE

## 2017-10-30 PROCEDURE — 84484 ASSAY OF TROPONIN QUANT: CPT | Performed by: EMERGENCY MEDICINE

## 2017-10-30 PROCEDURE — 85347 COAGULATION TIME ACTIVATED: CPT

## 2017-10-30 PROCEDURE — C9606 PERC D-E COR REVASC W AMI S: HCPCS | Performed by: INTERNAL MEDICINE

## 2017-10-30 PROCEDURE — 25010000002 AMIODARONE PER 30 MG: Performed by: EMERGENCY MEDICINE

## 2017-10-30 PROCEDURE — C1725 CATH, TRANSLUMIN NON-LASER: HCPCS | Performed by: INTERNAL MEDICINE

## 2017-10-30 PROCEDURE — 4A023N7 MEASUREMENT OF CARDIAC SAMPLING AND PRESSURE, LEFT HEART, PERCUTANEOUS APPROACH: ICD-10-PCS | Performed by: INTERNAL MEDICINE

## 2017-10-30 PROCEDURE — 71020 HC CHEST PA AND LATERAL: CPT

## 2017-10-30 PROCEDURE — 99153 MOD SED SAME PHYS/QHP EA: CPT | Performed by: INTERNAL MEDICINE

## 2017-10-30 PROCEDURE — 25010000002 MORPHINE PER 10 MG: Performed by: EMERGENCY MEDICINE

## 2017-10-30 PROCEDURE — 25010000002 HEPARIN (PORCINE) PER 1000 UNITS: Performed by: INTERNAL MEDICINE

## 2017-10-30 PROCEDURE — 25010000002 ONDANSETRON PER 1 MG

## 2017-10-30 PROCEDURE — 93005 ELECTROCARDIOGRAM TRACING: CPT

## 2017-10-30 PROCEDURE — 85007 BL SMEAR W/DIFF WBC COUNT: CPT | Performed by: EMERGENCY MEDICINE

## 2017-10-30 PROCEDURE — 25010000002 MIDAZOLAM PER 1 MG: Performed by: INTERNAL MEDICINE

## 2017-10-30 PROCEDURE — 99291 CRITICAL CARE FIRST HOUR: CPT

## 2017-10-30 PROCEDURE — 93306 TTE W/DOPPLER COMPLETE: CPT

## 2017-10-30 PROCEDURE — 63710000001 INSULIN ASPART PER 5 UNITS: Performed by: NURSE PRACTITIONER

## 2017-10-30 PROCEDURE — 92941 PRQ TRLML REVSC TOT OCCL AMI: CPT | Performed by: INTERNAL MEDICINE

## 2017-10-30 PROCEDURE — 82962 GLUCOSE BLOOD TEST: CPT

## 2017-10-30 PROCEDURE — 80048 BASIC METABOLIC PNL TOTAL CA: CPT | Performed by: NURSE PRACTITIONER

## 2017-10-30 PROCEDURE — 83036 HEMOGLOBIN GLYCOSYLATED A1C: CPT | Performed by: INTERNAL MEDICINE

## 2017-10-30 PROCEDURE — 25010000002 PERFLUTREN (DEFINITY) 8.476 MG IN SODIUM CHLORIDE 0.9 % 10 ML INJECTION: Performed by: INTERNAL MEDICINE

## 2017-10-30 PROCEDURE — 80048 BASIC METABOLIC PNL TOTAL CA: CPT | Performed by: INTERNAL MEDICINE

## 2017-10-30 PROCEDURE — 85576 BLOOD PLATELET AGGREGATION: CPT | Performed by: INTERNAL MEDICINE

## 2017-10-30 PROCEDURE — 93005 ELECTROCARDIOGRAM TRACING: CPT | Performed by: PHYSICIAN ASSISTANT

## 2017-10-30 PROCEDURE — 99222 1ST HOSP IP/OBS MODERATE 55: CPT | Performed by: INTERNAL MEDICINE

## 2017-10-30 PROCEDURE — C1874 STENT, COATED/COV W/DEL SYS: HCPCS | Performed by: INTERNAL MEDICINE

## 2017-10-30 PROCEDURE — 80061 LIPID PANEL: CPT | Performed by: INTERNAL MEDICINE

## 2017-10-30 PROCEDURE — 25010000002 FENTANYL CITRATE (PF) 100 MCG/2ML SOLUTION: Performed by: INTERNAL MEDICINE

## 2017-10-30 PROCEDURE — 93010 ELECTROCARDIOGRAM REPORT: CPT | Performed by: INTERNAL MEDICINE

## 2017-10-30 PROCEDURE — B2011ZZ PLAIN RADIOGRAPHY OF MULTIPLE CORONARY ARTERIES USING LOW OSMOLAR CONTRAST: ICD-10-PCS | Performed by: INTERNAL MEDICINE

## 2017-10-30 PROCEDURE — 027034Z DILATION OF CORONARY ARTERY, ONE ARTERY WITH DRUG-ELUTING INTRALUMINAL DEVICE, PERCUTANEOUS APPROACH: ICD-10-PCS | Performed by: INTERNAL MEDICINE

## 2017-10-30 PROCEDURE — 25010000002 AMIODARONE PER 30 MG

## 2017-10-30 PROCEDURE — 80053 COMPREHEN METABOLIC PANEL: CPT | Performed by: EMERGENCY MEDICINE

## 2017-10-30 PROCEDURE — 93005 ELECTROCARDIOGRAM TRACING: CPT | Performed by: EMERGENCY MEDICINE

## 2017-10-30 PROCEDURE — 92950 HEART/LUNG RESUSCITATION CPR: CPT

## 2017-10-30 PROCEDURE — 3E073PZ INTRODUCTION OF PLATELET INHIBITOR INTO CORONARY ARTERY, PERCUTANEOUS APPROACH: ICD-10-PCS | Performed by: INTERNAL MEDICINE

## 2017-10-30 DEVICE — XIENCE ALPINE EVEROLIMUS ELUTING CORONARY STENT SYSTEM 3.00 MM X 18 MM / RAPID-EXCHANGE
Type: IMPLANTABLE DEVICE | Status: FUNCTIONAL
Brand: XIENCE ALPINE

## 2017-10-30 RX ORDER — AMIODARONE HYDROCHLORIDE 200 MG/1
200 TABLET ORAL
Status: DISCONTINUED | OUTPATIENT
Start: 2017-10-30 | End: 2017-11-01 | Stop reason: HOSPADM

## 2017-10-30 RX ORDER — NALOXONE HCL 0.4 MG/ML
0.4 VIAL (ML) INJECTION
Status: DISCONTINUED | OUTPATIENT
Start: 2017-10-30 | End: 2017-11-01 | Stop reason: HOSPADM

## 2017-10-30 RX ORDER — CARVEDILOL 25 MG/1
25 TABLET ORAL 2 TIMES DAILY WITH MEALS
Status: DISCONTINUED | OUTPATIENT
Start: 2017-10-30 | End: 2017-11-01 | Stop reason: HOSPADM

## 2017-10-30 RX ORDER — ASPIRIN 81 MG/1
81 TABLET ORAL DAILY
Status: DISCONTINUED | OUTPATIENT
Start: 2017-10-30 | End: 2017-11-01 | Stop reason: HOSPADM

## 2017-10-30 RX ORDER — SPIRONOLACTONE 25 MG/1
25 TABLET ORAL DAILY
Status: DISCONTINUED | OUTPATIENT
Start: 2017-10-31 | End: 2017-11-01 | Stop reason: HOSPADM

## 2017-10-30 RX ORDER — SODIUM CHLORIDE 9 MG/ML
INJECTION, SOLUTION INTRAVENOUS CONTINUOUS PRN
Status: DISCONTINUED | OUTPATIENT
Start: 2017-10-30 | End: 2017-10-30 | Stop reason: HOSPADM

## 2017-10-30 RX ORDER — HEPARIN SODIUM 1000 [USP'U]/ML
INJECTION, SOLUTION INTRAVENOUS; SUBCUTANEOUS AS NEEDED
Status: DISCONTINUED | OUTPATIENT
Start: 2017-10-30 | End: 2017-10-30 | Stop reason: HOSPADM

## 2017-10-30 RX ORDER — ONDANSETRON 2 MG/ML
INJECTION INTRAMUSCULAR; INTRAVENOUS
Status: COMPLETED
Start: 2017-10-30 | End: 2017-10-30

## 2017-10-30 RX ORDER — METOPROLOL TARTRATE 5 MG/5ML
INJECTION INTRAVENOUS
Status: COMPLETED | OUTPATIENT
Start: 2017-10-30 | End: 2017-10-30

## 2017-10-30 RX ORDER — ATORVASTATIN CALCIUM 20 MG/1
40 TABLET, FILM COATED ORAL DAILY
Status: DISCONTINUED | OUTPATIENT
Start: 2017-10-30 | End: 2017-10-30

## 2017-10-30 RX ORDER — FENTANYL CITRATE 50 UG/ML
INJECTION, SOLUTION INTRAMUSCULAR; INTRAVENOUS AS NEEDED
Status: DISCONTINUED | OUTPATIENT
Start: 2017-10-30 | End: 2017-10-30 | Stop reason: HOSPADM

## 2017-10-30 RX ORDER — SODIUM CHLORIDE 0.9 % (FLUSH) 0.9 %
10 SYRINGE (ML) INJECTION AS NEEDED
Status: DISCONTINUED | OUTPATIENT
Start: 2017-10-30 | End: 2017-11-01 | Stop reason: HOSPADM

## 2017-10-30 RX ORDER — ONDANSETRON 2 MG/ML
INJECTION INTRAMUSCULAR; INTRAVENOUS
Status: DISPENSED
Start: 2017-10-30 | End: 2017-10-30

## 2017-10-30 RX ORDER — DEXTROSE MONOHYDRATE 25 G/50ML
25 INJECTION, SOLUTION INTRAVENOUS
Status: DISCONTINUED | OUTPATIENT
Start: 2017-10-30 | End: 2017-11-01 | Stop reason: HOSPADM

## 2017-10-30 RX ORDER — GLIPIZIDE 10 MG/1
10 TABLET ORAL
Status: DISCONTINUED | OUTPATIENT
Start: 2017-10-30 | End: 2017-11-01 | Stop reason: HOSPADM

## 2017-10-30 RX ORDER — NICOTINE POLACRILEX 4 MG
15 LOZENGE BUCCAL
Status: DISCONTINUED | OUTPATIENT
Start: 2017-10-30 | End: 2017-11-01 | Stop reason: HOSPADM

## 2017-10-30 RX ORDER — LIDOCAINE HYDROCHLORIDE 20 MG/ML
INJECTION, SOLUTION INFILTRATION; PERINEURAL AS NEEDED
Status: DISCONTINUED | OUTPATIENT
Start: 2017-10-30 | End: 2017-10-30 | Stop reason: HOSPADM

## 2017-10-30 RX ORDER — ONDANSETRON 2 MG/ML
INJECTION INTRAMUSCULAR; INTRAVENOUS
Status: COMPLETED | OUTPATIENT
Start: 2017-10-30 | End: 2017-10-30

## 2017-10-30 RX ORDER — NITROGLYCERIN 5 MG/ML
INJECTION, SOLUTION INTRAVENOUS AS NEEDED
Status: DISCONTINUED | OUTPATIENT
Start: 2017-10-30 | End: 2017-10-30 | Stop reason: HOSPADM

## 2017-10-30 RX ORDER — MORPHINE SULFATE 4 MG/ML
INJECTION, SOLUTION INTRAMUSCULAR; INTRAVENOUS
Status: COMPLETED | OUTPATIENT
Start: 2017-10-30 | End: 2017-10-30

## 2017-10-30 RX ORDER — CLOPIDOGREL BISULFATE 75 MG/1
75 TABLET ORAL DAILY
Status: DISCONTINUED | OUTPATIENT
Start: 2017-10-31 | End: 2017-10-30

## 2017-10-30 RX ORDER — AMIODARONE HYDROCHLORIDE 50 MG/ML
INJECTION, SOLUTION INTRAVENOUS
Status: COMPLETED | OUTPATIENT
Start: 2017-10-30 | End: 2017-10-30

## 2017-10-30 RX ORDER — MAGNESIUM SULFATE 1 G/100ML
INJECTION INTRAVENOUS
Status: COMPLETED | OUTPATIENT
Start: 2017-10-30 | End: 2017-10-30

## 2017-10-30 RX ORDER — ACETAMINOPHEN 325 MG/1
650 TABLET ORAL EVERY 4 HOURS PRN
Status: DISCONTINUED | OUTPATIENT
Start: 2017-10-30 | End: 2017-11-01 | Stop reason: HOSPADM

## 2017-10-30 RX ORDER — ASPIRIN 81 MG/1
TABLET, CHEWABLE ORAL
Status: COMPLETED | OUTPATIENT
Start: 2017-10-30 | End: 2017-10-30

## 2017-10-30 RX ORDER — CLOPIDOGREL BISULFATE 75 MG/1
600 TABLET ORAL ONCE
Status: DISCONTINUED | OUTPATIENT
Start: 2017-10-30 | End: 2017-10-30

## 2017-10-30 RX ORDER — ASPIRIN 325 MG
325 TABLET ORAL ONCE
Status: DISCONTINUED | OUTPATIENT
Start: 2017-10-30 | End: 2017-11-01 | Stop reason: HOSPADM

## 2017-10-30 RX ORDER — ATORVASTATIN CALCIUM 80 MG/1
80 TABLET, FILM COATED ORAL DAILY
Status: DISCONTINUED | OUTPATIENT
Start: 2017-10-31 | End: 2017-11-01 | Stop reason: HOSPADM

## 2017-10-30 RX ORDER — HYDROCODONE BITARTRATE AND ACETAMINOPHEN 5; 325 MG/1; MG/1
1 TABLET ORAL EVERY 4 HOURS PRN
Status: DISCONTINUED | OUTPATIENT
Start: 2017-10-30 | End: 2017-11-01 | Stop reason: HOSPADM

## 2017-10-30 RX ORDER — MIDAZOLAM HYDROCHLORIDE 1 MG/ML
INJECTION INTRAMUSCULAR; INTRAVENOUS AS NEEDED
Status: DISCONTINUED | OUTPATIENT
Start: 2017-10-30 | End: 2017-10-30 | Stop reason: HOSPADM

## 2017-10-30 RX ORDER — MORPHINE SULFATE 10 MG/ML
INJECTION, SOLUTION INTRAMUSCULAR; INTRAVENOUS
Status: DISPENSED
Start: 2017-10-30 | End: 2017-10-30

## 2017-10-30 RX ORDER — MORPHINE SULFATE 2 MG/ML
1 INJECTION, SOLUTION INTRAMUSCULAR; INTRAVENOUS EVERY 4 HOURS PRN
Status: DISCONTINUED | OUTPATIENT
Start: 2017-10-30 | End: 2017-11-01 | Stop reason: HOSPADM

## 2017-10-30 RX ORDER — CLOPIDOGREL BISULFATE 75 MG/1
TABLET ORAL
Status: COMPLETED | OUTPATIENT
Start: 2017-10-30 | End: 2017-10-30

## 2017-10-30 RX ORDER — SODIUM CHLORIDE 9 MG/ML
100 INJECTION, SOLUTION INTRAVENOUS CONTINUOUS
Status: ACTIVE | OUTPATIENT
Start: 2017-10-30 | End: 2017-10-30

## 2017-10-30 RX ADMIN — METOPROLOL TARTRATE 5 MG: 5 INJECTION, SOLUTION INTRAVENOUS at 00:53

## 2017-10-30 RX ADMIN — PERFLUTREN 2 ML: 6.52 INJECTION, SUSPENSION INTRAVENOUS at 08:03

## 2017-10-30 RX ADMIN — DEXTROSE MONOHYDRATE 1 MG/MIN: 50 INJECTION, SOLUTION INTRAVENOUS at 00:40

## 2017-10-30 RX ADMIN — ONDANSETRON: 2 INJECTION INTRAMUSCULAR; INTRAVENOUS at 00:42

## 2017-10-30 RX ADMIN — MAGNESIUM SULFATE IN DEXTROSE 1 G: 10 INJECTION, SOLUTION INTRAVENOUS at 00:39

## 2017-10-30 RX ADMIN — ASPIRIN 81 MG: 81 TABLET ORAL at 08:22

## 2017-10-30 RX ADMIN — INSULIN ASPART 6 UNITS: 100 INJECTION, SOLUTION INTRAVENOUS; SUBCUTANEOUS at 20:01

## 2017-10-30 RX ADMIN — SODIUM CHLORIDE 100 ML/HR: 9 INJECTION, SOLUTION INTRAVENOUS at 10:02

## 2017-10-30 RX ADMIN — CARVEDILOL 25 MG: 25 TABLET, FILM COATED ORAL at 08:18

## 2017-10-30 RX ADMIN — CARVEDILOL 25 MG: 25 TABLET, FILM COATED ORAL at 17:15

## 2017-10-30 RX ADMIN — AMIODARONE HYDROCHLORIDE 300 MG: 50 INJECTION, SOLUTION INTRAVENOUS at 00:38

## 2017-10-30 RX ADMIN — TICAGRELOR 60 MG: 60 TABLET ORAL at 21:58

## 2017-10-30 RX ADMIN — ATORVASTATIN CALCIUM 40 MG: 20 TABLET, FILM COATED ORAL at 08:23

## 2017-10-30 RX ADMIN — GLIPIZIDE 10 MG: 10 TABLET ORAL at 08:18

## 2017-10-30 RX ADMIN — AMIODARONE HYDROCHLORIDE 200 MG: 200 TABLET ORAL at 10:00

## 2017-10-30 RX ADMIN — MORPHINE SULFATE 4 MG: 4 INJECTION, SOLUTION INTRAMUSCULAR; INTRAVENOUS at 00:59

## 2017-10-30 RX ADMIN — ASPIRIN 81 MG 324 MG: 81 TABLET ORAL at 00:48

## 2017-10-30 RX ADMIN — SACUBITRIL AND VALSARTAN 1 TABLET: 24; 26 TABLET, FILM COATED ORAL at 20:01

## 2017-10-30 RX ADMIN — SODIUM CHLORIDE 100 ML/HR: 9 INJECTION, SOLUTION INTRAVENOUS at 04:15

## 2017-10-30 RX ADMIN — INSULIN ASPART 4 UNITS: 100 INJECTION, SOLUTION INTRAVENOUS; SUBCUTANEOUS at 18:00

## 2017-10-30 RX ADMIN — SACUBITRIL AND VALSARTAN 1 TABLET: 24; 26 TABLET, FILM COATED ORAL at 10:00

## 2017-10-30 RX ADMIN — INSULIN ASPART 6 UNITS: 100 INJECTION, SOLUTION INTRAVENOUS; SUBCUTANEOUS at 12:00

## 2017-10-30 RX ADMIN — ONDANSETRON 4 MG: 2 INJECTION INTRAMUSCULAR; INTRAVENOUS at 00:59

## 2017-10-30 RX ADMIN — CLOPIDOGREL BISULFATE 300 MG: 75 TABLET, FILM COATED ORAL at 00:48

## 2017-10-31 LAB
ANION GAP SERPL CALCULATED.3IONS-SCNC: 14 MMOL/L
BUN BLD-MCNC: 33 MG/DL (ref 8–23)
BUN/CREAT SERPL: 26.8 (ref 7–25)
CALCIUM SPEC-SCNC: 8.2 MG/DL (ref 8.6–10.5)
CHLORIDE SERPL-SCNC: 102 MMOL/L (ref 98–107)
CO2 SERPL-SCNC: 21 MMOL/L (ref 22–29)
CREAT BLD-MCNC: 1.23 MG/DL (ref 0.76–1.27)
GFR SERPL CREATININE-BSD FRML MDRD: 60 ML/MIN/1.73
GLUCOSE BLD-MCNC: 209 MG/DL (ref 65–99)
GLUCOSE BLDC GLUCOMTR-MCNC: 201 MG/DL (ref 70–130)
GLUCOSE BLDC GLUCOMTR-MCNC: 233 MG/DL (ref 70–130)
GLUCOSE BLDC GLUCOMTR-MCNC: 275 MG/DL (ref 70–130)
GLUCOSE BLDC GLUCOMTR-MCNC: 316 MG/DL (ref 70–130)
POTASSIUM BLD-SCNC: 4.1 MMOL/L (ref 3.5–5.2)
SODIUM BLD-SCNC: 137 MMOL/L (ref 136–145)

## 2017-10-31 PROCEDURE — 63710000001 INSULIN ASPART PER 5 UNITS: Performed by: NURSE PRACTITIONER

## 2017-10-31 PROCEDURE — 80048 BASIC METABOLIC PNL TOTAL CA: CPT | Performed by: NURSE PRACTITIONER

## 2017-10-31 PROCEDURE — 93010 ELECTROCARDIOGRAM REPORT: CPT | Performed by: INTERNAL MEDICINE

## 2017-10-31 PROCEDURE — 93005 ELECTROCARDIOGRAM TRACING: CPT | Performed by: INTERNAL MEDICINE

## 2017-10-31 PROCEDURE — 82962 GLUCOSE BLOOD TEST: CPT

## 2017-10-31 RX ADMIN — TICAGRELOR 60 MG: 60 TABLET ORAL at 09:00

## 2017-10-31 RX ADMIN — CARVEDILOL 25 MG: 25 TABLET, FILM COATED ORAL at 17:29

## 2017-10-31 RX ADMIN — AMIODARONE HYDROCHLORIDE 200 MG: 200 TABLET ORAL at 08:56

## 2017-10-31 RX ADMIN — RIVAROXABAN 20 MG: 20 TABLET, FILM COATED ORAL at 17:29

## 2017-10-31 RX ADMIN — INSULIN ASPART 6 UNITS: 100 INJECTION, SOLUTION INTRAVENOUS; SUBCUTANEOUS at 12:57

## 2017-10-31 RX ADMIN — TICAGRELOR 60 MG: 60 TABLET ORAL at 21:49

## 2017-10-31 RX ADMIN — INSULIN ASPART 4 UNITS: 100 INJECTION, SOLUTION INTRAVENOUS; SUBCUTANEOUS at 08:56

## 2017-10-31 RX ADMIN — ASPIRIN 81 MG: 81 TABLET ORAL at 08:56

## 2017-10-31 RX ADMIN — GLIPIZIDE 10 MG: 10 TABLET ORAL at 08:57

## 2017-10-31 RX ADMIN — ATORVASTATIN CALCIUM 80 MG: 80 TABLET, FILM COATED ORAL at 08:56

## 2017-10-31 RX ADMIN — SPIRONOLACTONE 25 MG: 25 TABLET, FILM COATED ORAL at 08:57

## 2017-10-31 RX ADMIN — SACUBITRIL AND VALSARTAN 1 TABLET: 24; 26 TABLET, FILM COATED ORAL at 21:49

## 2017-10-31 RX ADMIN — CARVEDILOL 25 MG: 25 TABLET, FILM COATED ORAL at 08:57

## 2017-10-31 RX ADMIN — INSULIN ASPART 7 UNITS: 100 INJECTION, SOLUTION INTRAVENOUS; SUBCUTANEOUS at 17:29

## 2017-10-31 RX ADMIN — INSULIN ASPART 4 UNITS: 100 INJECTION, SOLUTION INTRAVENOUS; SUBCUTANEOUS at 21:49

## 2017-11-01 VITALS
WEIGHT: 286 LBS | OXYGEN SATURATION: 96 % | RESPIRATION RATE: 16 BRPM | HEIGHT: 69 IN | TEMPERATURE: 98.6 F | BODY MASS INDEX: 42.36 KG/M2 | HEART RATE: 83 BPM | SYSTOLIC BLOOD PRESSURE: 134 MMHG | DIASTOLIC BLOOD PRESSURE: 73 MMHG

## 2017-11-01 LAB
ANION GAP SERPL CALCULATED.3IONS-SCNC: 11.6 MMOL/L
BUN BLD-MCNC: 27 MG/DL (ref 8–23)
BUN/CREAT SERPL: 21.1 (ref 7–25)
CALCIUM SPEC-SCNC: 8.6 MG/DL (ref 8.6–10.5)
CHLORIDE SERPL-SCNC: 104 MMOL/L (ref 98–107)
CO2 SERPL-SCNC: 22.4 MMOL/L (ref 22–29)
CREAT BLD-MCNC: 1.28 MG/DL (ref 0.76–1.27)
GFR SERPL CREATININE-BSD FRML MDRD: 57 ML/MIN/1.73
GLUCOSE BLD-MCNC: 123 MG/DL (ref 65–99)
GLUCOSE BLDC GLUCOMTR-MCNC: 116 MG/DL (ref 70–130)
GLUCOSE BLDC GLUCOMTR-MCNC: 271 MG/DL (ref 70–130)
POTASSIUM BLD-SCNC: 4.1 MMOL/L (ref 3.5–5.2)
SODIUM BLD-SCNC: 138 MMOL/L (ref 136–145)

## 2017-11-01 PROCEDURE — 82962 GLUCOSE BLOOD TEST: CPT

## 2017-11-01 PROCEDURE — 80048 BASIC METABOLIC PNL TOTAL CA: CPT | Performed by: NURSE PRACTITIONER

## 2017-11-01 PROCEDURE — 63710000001 INSULIN ASPART PER 5 UNITS: Performed by: NURSE PRACTITIONER

## 2017-11-01 RX ORDER — ATORVASTATIN CALCIUM 80 MG/1
80 TABLET, FILM COATED ORAL DAILY
Qty: 30 TABLET | Refills: 11 | Status: SHIPPED | OUTPATIENT
Start: 2017-11-01 | End: 2022-04-12 | Stop reason: SDUPTHER

## 2017-11-01 RX ADMIN — SACUBITRIL AND VALSARTAN 1 TABLET: 24; 26 TABLET, FILM COATED ORAL at 09:32

## 2017-11-01 RX ADMIN — AMIODARONE HYDROCHLORIDE 200 MG: 200 TABLET ORAL at 09:32

## 2017-11-01 RX ADMIN — INSULIN ASPART 6 UNITS: 100 INJECTION, SOLUTION INTRAVENOUS; SUBCUTANEOUS at 12:08

## 2017-11-01 RX ADMIN — CARVEDILOL 25 MG: 25 TABLET, FILM COATED ORAL at 09:32

## 2017-11-01 RX ADMIN — ATORVASTATIN CALCIUM 80 MG: 80 TABLET, FILM COATED ORAL at 09:32

## 2017-11-01 RX ADMIN — SPIRONOLACTONE 25 MG: 25 TABLET, FILM COATED ORAL at 09:32

## 2017-11-01 RX ADMIN — GLIPIZIDE 10 MG: 10 TABLET ORAL at 09:32

## 2017-11-01 RX ADMIN — ASPIRIN 81 MG: 81 TABLET ORAL at 09:32

## 2017-11-01 RX ADMIN — TICAGRELOR 60 MG: 60 TABLET ORAL at 09:33

## 2017-11-01 NOTE — PLAN OF CARE
Problem: Patient Care Overview (Adult)  Goal: Plan of Care Review  Outcome: Ongoing (interventions implemented as appropriate)    11/01/17 1105   Outcome Evaluation   Outcome Summary/Follow up Plan Discharged orders in. Awaiting approval for Life Vest. Will continue to monitor.    Coping/Psychosocial Response Interventions   Plan Of Care Reviewed With patient   Patient Care Overview   Progress improving       Goal: Adult Individualization and Mutuality  Outcome: Ongoing (interventions implemented as appropriate)  Goal: Discharge Needs Assessment  Outcome: Ongoing (interventions implemented as appropriate)    Problem: Pain, Acute (Adult)  Goal: Acceptable Pain Control/Comfort Level  Outcome: Ongoing (interventions implemented as appropriate)    11/01/17 1105   Pain, Acute (Adult)   Acceptable Pain Control/Comfort Level making progress toward outcome         Problem: Cardiac Catheterization with/without PCI (Adult)  Goal: Signs and Symptoms of Listed Potential Problems Will be Absent or Manageable (Cardiac Catheterization with/without PCI)  Outcome: Ongoing (interventions implemented as appropriate)    11/01/17 1105   Cardiac Catheterization with/without PCI   Problems Assessed (Cardiac Catheterization) all   Problems Present (Cardiac Catheterization) none

## 2017-11-01 NOTE — DISCHARGE INSTR - ACTIVITY
Do not lift, push or pull greater than 5 lbs for at least 5 days.   You may shower, but do not submerge your incisions (i.e., no baths, pools, hot tubs, etc.) for at least one week.   Clean your incision daily in the shower with Dial or Ivory soap. Do not put any additional lotions, creams, antibiotics, or any other substance on your incision. Pat dry.   If your site starts bleeding, hold pressure for 25 minutes with your arm over your chest. If it continues to bleed after 25 minutes then proceed to the ER.

## 2017-11-01 NOTE — DISCHARGE SUMMARY
Date of Discharge:  11/1/2017    Discharge Diagnosis: Acute Ant MI, STEMI, CAD, ASHD, VT with aborted SCD    Presenting Problem/History of Present Illness  Ventricular fibrillation [I49.01]  ST elevation myocardial infarction (STEMI), unspecified artery [I21.3]  Anterior myocardial infarction [I21.09]     Patient Active Problem List   Diagnosis   • Acute anterior wall MI   • Obesity (BMI 30.0-34.9)   • CHF (congestive heart failure), NYHA class I   • Type 2 diabetes mellitus without complication   • Hyperlipidemia   • Coronary artery disease   • Anterior myocardial infarction        Hospital Course  Patient is a 62 y.o. male presented with chest pain. Was found to have STEMI and STEMI team was initiated.  He was brought to [facility] and while in the ER he had a cardiopulmonary arrest and required CPR. ROSC was obtained and he was taken to the cath lab.  Below mentioned procedure was competed.  Post operatively he has done well with only a sore chest from compressions but no further CP.  Today he has no dizziness or nausea.  He feels well with no complaints.  Rt wrist was accessed for intervention and is soft with minimal bruises.  Plans are for LifeVest at discharge.  Currently we are awaiting confirmation from insurance that this will be approved.  Statin dose has been increased, Brilinta started and Plavix stopped.  Continue Xarelto with ASA and Brilinta at this time.      Procedures Performed  Procedure(s):  Left Heart Cath  Coronary angiography  Percutaneous Coronary Intervention  Stent SANIA coronary      Left Ventricle  Left ventricular systolic function is moderately decreased. Calculated EF = 37%. Estimated EF was in disagreement with the calculated EF. Estimated EF appears to be in the range of 26 - 30%. The left ventricular cavity is borderline dilated. Left ventricular wall thickness is consistent with mild concentric hypertrophy. Septal wall motion is abnormal. Left ventricular diastolic dysfunction is noted  (grade I) consistent with impaired relaxation. Normal left atrial pressure.      Right Ventricle  Normal right ventricular cavity size, wall thickness, systolic function and septal motion noted.      Left Atrium  Left atrial cavity size is borderline dilated.      Right Atrium  Normal right atrial size noted.      Aortic Valve  The aortic valve is structurally normal. No aortic valve regurgitation is present. No aortic valve stenosis is present.      Mitral Valve  The mitral valve is normal in structure. Trace mitral valve regurgitation is present. No significant mitral valve stenosis is present.      Tricuspid Valve  The tricuspid valve is normal. No tricuspid valve stenosis is present. No tricuspid valve regurgitation is present.      Pulmonic Valve  The pulmonic valve was not well visualized. The pulmonic valve is structurally normal.      Greater Vessels  No dilation of the aortic root is present. No dilation of the sinuses of Valsalva is present.      Pericardium  The pericardium is normal. There is no evidence of pericardial effusion.                 FINDINGS:      CORONARY ANGIOGRAPHY:   1.  Left main: 0% stenosis.  Vessel trifurcates into a left anterior descending, ramus, and left circumflex arteries.  2.  Left anterior descending artery: 30% ostial stenosis.  There is a previously placed proximal stent that overlies a moderate-sized first septal branch.  There is 100% occlusion of the LAD starting in the distal half of the stent with AKASH 0 flow.  Following reestablishment of flow there is a subtotal stenosis of the proximal LAD extending from the distal edge of the stent prior to the origin of a large first diagonal branch.  Just prior to the origin of the first diagonal branch there is an ectatic area of the LAD.  The first diagonal branch itself is a large vessel with scattered 10% stenosis.  The mid LAD just after the origin of the first diagonal branch has a 50% stenosis.  The remainder of the mid to  distal LAD appears largely free of disease until the apical tip where it again there appear to be migration of thrombus.  3.  Ramus intermedius: 70% stenosis of the mid vessel.  This appears unchanged appearance compared to prior angiogram.  4.  Left circumflex artery: 0% proximal stenosis.  Gives off a moderate-sized first obtuse marginal branch with a 70% ostial and proximal stenosis.  The mid left circumflex artery has 0% stenosis.  The distal left circumflex artery appears to taper to a small vessel and then terminates in the AV groove.  A second large obtuse marginal branch appears to originate from the mid left circumflex artery that has 100% mid occlusion that appears to have been present on his prior angiogram.  5.  Right coronary artery: 80% mid to distal stenosis.  Distal LAD with 20-30% stenosis.  The RCA bifurcates into a large posterior descending and posterior lateral arteries both with no significant disease.  This is a right dominant system.          LEFT VENTRICULAR HEMODYNAMICS:    1.  Left ventricular pressure: 131/27  2.  Aortic pressure: 132/86          CORONARY INTERVENTION FINDINGS:  PCI CORONARY SEGMENT: proximal LAD  PRE-STENOSIS: 100%  POST-STENOSIS: 0%  LESION TYPE: C  AKASH FLOW PRE/POST: 0/3  CULPRIT LESION: yes  DISSECTION: none          POST-PROCEDURE DIAGNOSIS:   1. Anterolateral myocardial infarction status post PCI of the proximal LAD  2. Resuscitated ventricular fibrillation arrest          RECOMMENDATIONS:   Continue dual antiplatelet therapy.  Continue amiodarone gtt today.  Routine post-MI care.        Radiation          Event Details User      2:55 AM Radiation Tracking Cumulative Air Kerma: Total Dose (mGy) = 1913.000  Physician: Zully Gregg MD  Dose (mGy) = 1913.000  Fluoro Time (mins) = 16.4 BS        Stent Inflated          Event Details User      2:20 AM Stent Inflated Stent Xience Alpine Boris Rx [id number] - First balloon inflation max pressure = 12 devin. First balloon  inflation duration = 10 seconds. Second inflation of balloon - Max pressure = 12 devin. 2nd Inflation of balloon - Duration = 10 seconds. Third inflation of balloon - Max pressure = 12 devin. 3rd Inflation of balloon - Duration = 10 seconds.              Consults:   Consults     Date and Time Order Name Status Description    10/30/2017 0647 Inpatient Consult to Cardiology            Pertinent Test Results:   PA AND LATERAL CHEST X-RAY      HISTORY: Chest Pain triage protocol      COMPARISON: 06/20/2012.      FINDINGS: PA and lateral views of the chest were obtained. Right  diaphragm is elevated, unchanged with some mild adjacent right lung base  atelectasis. Left lung well inflated and clear. Stable cardiomegaly. No  edema or pleural fluid.              IMPRESSION:  Elevated right diaphragm with atelectasis, no active disease      This report was finalized on 10/30/2017 12:36 AM by Mata Su MD.        RR Interval= 896 ms  ID Interval= ms  QRSD Interval= 102 ms  QT Interval= 492 ms  QTc Interval= 520 ms  Heart Rate= 67 ms  P Axis= deg  QRS Axis= 4 deg  T Wave Axis= 144 deg  I: 40 Axis= 14 deg  T: 40 Axis= -8 deg  ST Axis= 174 deg  ATRIAL FIBRILLATION  LOW VOLTAGE IN FRONTAL LEADS  ABNORMAL T, CONSIDER ISCHEMIA, ANT-LAT LEADS  PROLONGED QT INTERVAL  maarked st t changes are new and c/w ischemia or cns event  Electronically Signed by:  Elias Herring ([facility]) ([facility]) 31-Oct-2017 20:45:08  Date and Time of Study: 2017-10-31 06:40:32    RR Interval= 330 ms  ID Interval= 98 ms  QRSD Interval= 226 ms  QT Interval= 380 ms  QTc Interval= 661 ms  Heart Rate= 182 ms  P Axis= 0 deg  QRS Axis= 212 deg  T Wave Axis= 6 deg  I: 40 Axis= 143 deg  T: 40 Axis= 232 deg  ST Brooten= -4 deg  EXTREME TACHYCARDIA WITH WIDE COMPLEX, NO FURTHER RHYTHM ANALYSIS ATTEMPTED  compared to previous ecg the findings are new  Electronically Signed by:  Ernie Bradshaw ([facility]) 30-Oct-2017 19:45:10  Date and Time of Study: 2017-10-30 00:37:20    Ejection  "Fraction  No results found for: EF    Condition on Discharge:  Good    Physical Exam at Discharge  /80 (BP Location: Left arm, Patient Position: Lying)  Pulse 91  Temp 98 °F (36.7 °C) (Oral)   Resp 16  Ht 69\" (175.3 cm)  Wt 286 lb (130 kg)  SpO2 97%  BMI 42.23 kg/m2                                                                                    Vital Signs  Temp:  [97.5 °F (36.4 °C)-98 °F (36.7 °C)] 98 °F (36.7 °C)  Heart Rate:  [] 91  Resp:  [16] 16  BP: (107-134)/(63-97) 134/80  Wt Readings from Last 4 Encounters:   11/01/17 286 lb (130 kg)   08/22/17 286 lb (130 kg)   01/25/17 286 lb (130 kg)   10/25/16 274 lb (124 kg)      General Appearance:    Alert, cooperative, in no acute distress   Head:    Normocephalic, without obvious abnormality, atraumatic   Eyes:            Conjunctivae normal, no   icterus   Neck:   No adenopathy, supple, trachea midline, no thyromegaly, no   carotid bruit, no JVD   Lungs:     Clear to auscultation,respirations regular, even and                  unlabored    Heart:     Regular rhythm and normal rate, normal S1 and S2,            No murmur, no gallop, no rub, no click   Chest Wall:    No abnormalities observed   Abdomen:     Normal bowel sounds, no masses, no organomegaly, soft        non-tender, non-distended, no guarding, no rebound                tenderness   Rectal:     Deferred   Extremities:   No edema. Moves all extremities well, no cyanosis, no erythema   Pulses:   Pulses palpable and equal bilaterally   Skin:   No bleeding, bruising or rash   Neurologic:   Cranial nerves 2 - 12 grossly intact, sensation intact, DTR       present and equal bilaterally          Discharge Disposition  Home or Self Care    Discharge Medications   Joshua Wolf   Home Medication Instructions BRODIE:[id number]    Printed on:11/01/17 0148   Medication Information                      amiodarone (PACERONE) 200 MG tablet  Take 300 mg by mouth daily.             aspirin 81 MG EC " tablet  Take 81 mg by mouth Daily.             atorvastatin (LIPITOR) 80 MG tablet  Take 1 tablet by mouth Daily.             carvedilol (COREG) 25 MG tablet  Take 25 mg by mouth 2 (Two) Times a Day With Meals. Take two in the morning and one at night.             furosemide (LASIX) 40 MG tablet  Take 40 mg by mouth daily.             glimepiride (AMARYL) 4 MG tablet  Take 1 tablet by mouth Every Morning Before Breakfast.             metFORMIN (FORTAMET) 1000 MG (OSM) 24 hr tablet  Take 1 tablet by mouth Daily With Breakfast.             potassium chloride (K-DUR,KLOR-CON) 20 MEQ CR tablet  Take 20 mEq by mouth daily.             sacubitril-valsartan (ENTRESTO) 24-26 MG tablet  Take 1 tablet by mouth Every 12 (Twelve) Hours.             spironolactone (ALDACTONE) 25 MG tablet  TK 1 T PO  QD             ticagrelor (BRILINTA) 60 MG tablet tablet  Take 1 tablet by mouth 2 (Two) Times a Day.             XARELTO 20 MG tablet                   Discharge Diet: Healthy Heart Consistent Carb    Activity at Discharge: Without extreme exertion for 2 weeks.    Follow-up Appointments  Future Appointments  Date Time Provider Department Center   2/22/2018 2:30 PM Eric Eubanks Jr., MD [id number] None         Test Results at Discharge  Lab Results   Component Value Date    GLUCOSE 123 (H) 11/01/2017    CALCIUM 8.6 11/01/2017     11/01/2017    K 4.1 11/01/2017    CO2 22.4 11/01/2017     11/01/2017    BUN 27 (H) 11/01/2017    CREATININE 1.28 (H) 11/01/2017    EGFRIFAFRI 49 (L) 08/22/2017    EGFRIFNONA 57 (L) 11/01/2017    BCR 21.1 11/01/2017    ANIONGAP 11.6 11/01/2017        Lab Results   Component Value Date    GLUCOSE 123 (H) 11/01/2017    BUN 27 (H) 11/01/2017    CREATININE 1.28 (H) 11/01/2017    EGFRIFNONA 57 (L) 11/01/2017    EGFRIFAFRI 49 (L) 08/22/2017    BCR 21.1 11/01/2017    CO2 22.4 11/01/2017    CALCIUM 8.6 11/01/2017    PROTENTOTREF 6.8 08/22/2017    ALBUMIN 4.10 10/30/2017    LABIL2 1.3 10/30/2017    AST 34  10/30/2017    ALT 40 10/30/2017        Troponin T   Date Value Ref Range Status   10/30/2017 <0.010 0.000 - 0.030 ng/mL Final       Lab Results   Component Value Date    WBC 13.24 (H) 10/30/2017    HGB 14.0 10/30/2017    HCT 41.2 10/30/2017    MCV 92.8 10/30/2017     10/30/2017      Lab Results   Component Value Date    CHOL 166 10/30/2017    CHOL 191 07/19/2016     Lab Results   Component Value Date    HDL 40 10/30/2017    HDL 39 (L) 08/22/2017    HDL 39 (L) 10/25/2016     Lab Results   Component Value Date    LDL 89 08/22/2017    LDL 45 10/25/2016     Lab Results   Component Value Date    TRIG 57 10/30/2017    TRIG 192 (H) 08/22/2017    TRIG 98 10/25/2016     No components found for: CHOLHDL   Lab Results   Component Value Date    HGBA1C 8.80 (H) 10/30/2017      Lab Results   Component Value Date    TSH 2.820 08/22/2017           BREANNE Huynh  11/01/17  8:48 AM  Patient seen and examined.  Agree with the note above by Claire RAYMUNDO.  She admitted with an acute STEMI.  He had stenting of the LAD acutely.  Also had ventricular fibrillation.  He had had a similar problem a few years ago.  He has a EF on echocardiogram felt to be about 26-30%.  Therefore he is going to be discharged with a lifevest and we will decide later about implantable defibrillator.  I will perform a follow-up echocardiogram at some point in the office and then make that decision.  I'm not sure at this point the patient will agree to an implant implanted defibrillator but will discuss that when appropriate.  He's going to be on triple therapy now have to watch for any problems with bleeding.  He has chronic atrial fibrillation and is on Xarelto.  He will now be on aspirin and Brilinta.  His previous Plavix was discontinued.  On the day of discharge he has no JVD, clear chest, no edema.  His rhythm is irregular with chronic atrial fibrillation.  No significant murmur.  I'm going to see him in the office in one week and we will talk  then about one week and start phase II cardiac rehabilitation.  He realizes that he needs significant weight loss and hopefully the nutritionist in cardiac rehabilitation will help with that.  Joshua Mc MD  Time: 40 min

## 2017-11-01 NOTE — PROGRESS NOTES
Discharge Planning Assessment  [facility]     Patient Name: Joshua Wolf  MRN: [id number]  Today's Date: 11/1/2017    Admit Date: 10/30/2017          Discharge Needs Assessment       11/01/17 1700    Living Environment    Lives With spouse    Living Arrangements house    Home Accessibility no concerns    Stair Railings at Home none    Type of Financial/Environmental Concern none    Transportation Available family or friend will provide;car    Living Environment    Provides Primary Care For no one    Quality Of Family Relationships supportive;helpful;involved    Able to Return to Prior Living Arrangements yes    Discharge Needs Assessment    Concerns To Be Addressed denies needs/concerns at this time;no discharge needs identified    Anticipated Changes Related to Illness none    Equipment Currently Used at Home none    Equipment Needed After Discharge other (see comments)   LIFEVEST    Discharge Contact Information if Applicable Chuyita Wolf wife [phone]            Discharge Plan       11/01/17 1711    Case Management/Social Work Plan    Additional Comments Late entry 11/1/17 @ 12:00 PM:  Spoke with Pt and spouse Chuyita at bedside at 12:00 PM.  CCP introduced self and role.  Pt confirmed information on face sheet.  Pt stated he is [employer], is self- employed, work's from home & drives.  Pt denies past home health, subacute rehab and DME.  Pt confirms pharmacy as [facility] on [address] [facility].  Pt denies issues with affording his medications.  Per Md order Pt will d/c home with a life vest.  CCP called wood Gandara [phone] this morning with referral and she advised Kedar, (the [employer] liaison for Dr. Turcios) was already working on getting Pt set up for life vest.  Pt plans to return home at discharge and does not anticipate any discharge needs.  CCP will follow as needed.           Discharge Placement     No information found        Expected Discharge Date and Time     Expected Discharge Date  Expected Discharge Time    Nov 1, 2017               Demographic Summary     None            Functional Status       11/01/17 1700    Functional Status Current    Ambulation 0-->independent    Transferring 0-->independent    Toileting 0-->independent    Bathing 0-->independent    Dressing 0-->independent    Eating 0-->independent    Communication 0-->understands/communicates without difficulty    Swallowing (if score 2 or more for any item, consult Rehab Services) 0-->swallows foods/liquids without difficulty    Change in Functional Status Since Onset of Current Illness/Injury no    Functional Status Prior    Ambulation 0-->independent    Transferring 0-->independent    Toileting 0-->independent    Bathing 0-->independent    Dressing 0-->independent    Eating 0-->independent    Communication 0-->understands/communicates without difficulty    Swallowing 0-->swallows foods/liquids without difficulty    Activity Tolerance    Current Activity Limitations none    Cognitive/Perceptual/Developmental    Current Mental Status/Cognitive Functioning no deficits noted            Psychosocial     None            Abuse/Neglect     None            Legal     None            Substance Abuse     None            Patient Forms     None          Candis Hayes RN

## 2017-11-01 NOTE — PROGRESS NOTES
Case Management Discharge Note    Final Note: Pt d/c home with spouse transporting.  Ian did deliver lifevest and place it on Pt prior to d/c.  Pt denied all other d/c needs.     Discharge Placement     No information found        Other: Other (private auto)    Discharge Codes: 01  Discharge to home

## 2017-11-01 NOTE — PLAN OF CARE
Problem: Patient Care Overview (Adult)  Goal: Plan of Care Review  Outcome: Ongoing (interventions implemented as appropriate)    11/01/17 0600   Outcome Evaluation   Outcome Summary/Follow up Plan VSS, no complaints of chest pain over night. Will continue to monitor. Home with lifevest at discharge    Coping/Psychosocial Response Interventions   Plan Of Care Reviewed With patient   Patient Care Overview   Progress improving         Problem: Pain, Acute (Adult)  Goal: Acceptable Pain Control/Comfort Level  Outcome: Ongoing (interventions implemented as appropriate)    11/01/17 0757   Pain, Acute (Adult)   Acceptable Pain Control/Comfort Level making progress toward outcome

## 2017-11-10 ENCOUNTER — OFFICE VISIT (OUTPATIENT)
Dept: INTERNAL MEDICINE | Facility: CLINIC | Age: 62
End: 2017-11-10

## 2017-11-10 VITALS
OXYGEN SATURATION: 97 % | TEMPERATURE: 97 F | BODY MASS INDEX: 41.94 KG/M2 | DIASTOLIC BLOOD PRESSURE: 76 MMHG | SYSTOLIC BLOOD PRESSURE: 98 MMHG | WEIGHT: 284 LBS | HEART RATE: 97 BPM

## 2017-11-10 DIAGNOSIS — E11.9 TYPE 2 DIABETES MELLITUS WITHOUT COMPLICATION, WITHOUT LONG-TERM CURRENT USE OF INSULIN (HCC): ICD-10-CM

## 2017-11-10 DIAGNOSIS — I25.10 CORONARY ARTERY DISEASE INVOLVING NATIVE CORONARY ARTERY OF NATIVE HEART WITHOUT ANGINA PECTORIS: ICD-10-CM

## 2017-11-10 DIAGNOSIS — I50.22 CHRONIC SYSTOLIC CONGESTIVE HEART FAILURE, NYHA CLASS 1 (HCC): Primary | ICD-10-CM

## 2017-11-10 DIAGNOSIS — E78.2 MIXED HYPERLIPIDEMIA: ICD-10-CM

## 2017-11-10 PROCEDURE — 99214 OFFICE O/P EST MOD 30 MIN: CPT | Performed by: FAMILY MEDICINE

## 2017-11-10 RX ORDER — METFORMIN HYDROCHLORIDE 500 MG/1
TABLET, EXTENDED RELEASE ORAL
Refills: 4 | COMMUNITY
Start: 2017-09-18 | End: 2017-12-11 | Stop reason: SDUPTHER

## 2017-11-10 NOTE — PROGRESS NOTES
Subjective   Joshua Wolf is a 62 y.o. male.     Chief Complaint   Patient presents with   • Coronary Artery Disease   • Diabetes         History of Present Illness   Patient has survived full cardiac arrest with ventricular fibrillation and heart catheterization with stent placement.  He is on medical therapy as noted including full antiplatelet therapy.  He has a defibrillator vest on as well.  A close cardiac follow-up and discussion of diabetes control and weight loss is done quite frankly.  He is now on Weight [employer] and hopefully that will be of great benefit to him otherwise atelectasis seen him back in February in 3 months sooner if needed.      The following portions of the patient's history were reviewed and updated as appropriate: allergies, current medications, past social history and problem list.    Review of Systems   Constitutional: Negative.    HENT: Negative.    Eyes: Negative.    Respiratory: Positive for shortness of breath.    Cardiovascular: Negative.    Gastrointestinal: Negative.    Endocrine: Negative.    Genitourinary: Negative.    Musculoskeletal: Negative.    Skin: Negative.    Allergic/Immunologic: Negative.    Neurological: Negative.    Hematological: Negative.    Psychiatric/Behavioral: Negative.        Objective   Vitals:    11/10/17 1659   BP: 98/76   Pulse: 97   Temp: 97 °F (36.1 °C)   SpO2: 97%     Physical Exam   Constitutional: He is oriented to person, place, and time. He appears well-developed and well-nourished.   HENT:   Head: Normocephalic and atraumatic.   Right Ear: Tympanic membrane and external ear normal.   Left Ear: Tympanic membrane and external ear normal.   Nose: Nose normal.   Mouth/Throat: Oropharynx is clear and moist.   Eyes: Conjunctivae and EOM are normal. Pupils are equal, round, and reactive to light.   Neck: Normal range of motion. Neck supple. No JVD present. No thyromegaly present.   Cardiovascular: Normal rate, regular rhythm, normal heart sounds and  intact distal pulses.    Pulmonary/Chest: Effort normal and breath sounds normal.   Abdominal: Soft. Bowel sounds are normal.   Musculoskeletal: Normal range of motion.   Lymphadenopathy:     He has no cervical adenopathy.   Neurological: He is alert and oriented to person, place, and time. No cranial nerve deficit. Coordination normal.   Skin: Skin is warm and dry. No rash noted.   Psychiatric: He has a normal mood and affect. His behavior is normal. Judgment and thought content normal.   Vitals reviewed.      Assessment/Plan   Problem List Items Addressed This Visit        Cardiovascular and Mediastinum    CHF (congestive heart failure), NYHA class I - Primary    Hyperlipidemia    Coronary artery disease       Endocrine    Type 2 diabetes mellitus without complication    Relevant Medications    metFORMIN ER (GLUCOPHAGE-XR) 500 MG 24 hr tablet      Fortunately his creatinine is improved with improvement in heart function.  Continue current diabetic therapy with discussion about weight loss and return in about 3 months.

## 2017-11-14 ENCOUNTER — TRANSCRIBE ORDERS (OUTPATIENT)
Dept: CARDIAC REHAB | Facility: HOSPITAL | Age: 62
End: 2017-11-14

## 2017-11-14 DIAGNOSIS — I21.09 ACUTE ANTERIOR WALL MI (HCC): Primary | ICD-10-CM

## 2017-11-15 ENCOUNTER — OFFICE VISIT (OUTPATIENT)
Dept: CARDIAC REHAB | Facility: HOSPITAL | Age: 62
End: 2017-11-15

## 2017-11-15 VITALS — HEIGHT: 69 IN | WEIGHT: 278 LBS | BODY MASS INDEX: 41.18 KG/M2

## 2017-11-15 DIAGNOSIS — I21.3 ST ELEVATION MYOCARDIAL INFARCTION (STEMI), UNSPECIFIED ARTERY (HCC): Primary | ICD-10-CM

## 2017-11-15 DIAGNOSIS — Z95.5 STATUS POST INSERTION OF DRUG ELUTING CORONARY ARTERY STENT: ICD-10-CM

## 2017-11-15 PROCEDURE — 93797 PHYS/QHP OP CAR RHAB WO ECG: CPT

## 2017-11-15 NOTE — PROGRESS NOTES
Cardiac Rehab Initial Assessment      Name: Joshua Wolf  :1955 Allergies:Review of patient's allergies indicates no known allergies.   MRN: [id number] 62 y.o. Physician: Eric Eubanks Jr., MD   Primary Diagnosis:    Diagnosis Plan   1. ST elevation myocardial infarction (STEMI), unspecified artery     2. Status post insertion of drug eluting coronary artery stent      Event Date: 10-30-17 Specialist: Dr. Mc   Secondary Diagnosis: Vent. Fib arrest, and now wearing a life vest Risk Stratification:High Risk Note Author: Marce Perez RN     Cardiovascular History: Was having chest pain and came to the ER.  Increasing chest pain.      EXERCISE AT HOME  yes  1.2 miles 24 minutes  2 days per week    EF: 25-30%      Source: Echo 10/30/17          Ambulatory Status:Independent  Ambulatory Fall Risk Assessed on Initial Visit: yes 6 Minute Walk Pre- Cardiac Rehab:  Distance:1386ft      RPE:1  Max. HR: 82       SPO2:94    MET: 3    MPH: 2.6             RPD: 1  Resting BP: 100/64 LA, 98/68 RA    Peak BP: 124/60  Recovery BP: 94/68  Comments: Tolerated well.     NUTRITION  Lipids:yes If yes, labs as follows;  Total: No components found for: CHOLESTEROL  HDL:   HDL Cholesterol   Date Value Ref Range Status   10/30/2017 40 40 - 60 mg/dL Final    Lipids continued:  LDL:  LDL Cholesterol    Date Value Ref Range Status   2017 89 0 - 100 mg/dL Final     Triglyceride: No components found for: TRIGLYCERIDE   Weight Management:                 Weight: 278  Height: 69                         BMI: Body mass index is 41.05 kg/(m^2).  Waist Circumference: 52.5  inches   Alcohol Use: defer   Diabetes:Yes,  Monitors BS at home- yes, Frequency: every few days, Random BS: 228 2 days ago taken in the afternoon 1.5 hours after a meal    Last HGBA1C with date if applicable:No components found for: A1C         SOCIAL HISTORY  Social History     Social History   • Marital status:      Spouse name: N/A   • Number of  children: N/A   • Years of education: N/A     Social History Main Topics   • Smoking status: Former Smoker     Years: 15.00     Types: Cigarettes     Quit date: 2001   • Smokeless tobacco: Not on file   • Alcohol use 2.4 oz/week     4 Glasses of wine per week   • Drug use: No   • Sexual activity: Not on file     Other Topics Concern   • Not on file     Social History Narrative       Educational Level (choose one that applies) college graduate Learning Barriers:Ready to Learn    Family Support:yes    Living Arrangement: lives with their spouse    Risk Factors: Stress  No, Clinical Depression  No, Heredity  No If Yes na, Hyperlipidemia  Yes, Diabetes  Yes If Yes: Do you check blood glucose daily  No Today's glucose level na blood sugar from 2 days ago was 228 1.5 hours after eating lunch., Exercise prior to event  Yes If Yes: Activity see above, Minutes per day24, Days per week 2 and Obesity  Yes     Tobacco Adjunct: No  Smoked 0.75 ppd for about 40 years.  2006      Comorbidities: Renal disease, Diabetes Mellitus and Previous MI   Decreased kidney function  Had additional stenting of the same artery     PSYCHOSOCIAL  Clinical Depression: no    Stress: no     Assess presence or absence of depression using a valid screening tool: yes      PHYSICAL ASSESSMENT  Influenza vaccine: yes  Pneumococcal vaccine: no          Angina: no    Describe angina scale of 0 - 4: 0 = none    Today are you having incisional pain? N/A. If, Yes, Scale: na    Today are you having any other pain? Yes. If, Yes, Scale: 5  Rib cage and back  muscles are still painful from cardiac resuscitation      Diagnosed with Hypertension:yes    Heart Sounds: normal without murmur     Lung Sounds: normal air entry, lungs some slight rhonchi in rll.         Assessment: Still wearing life vest.  Will have another Echocardiogram mid-December to re-evaluate EF.  Over past year has been gaining weight, has started back to weight watchers.  Orthopedic Problems:  none    Are you being hurt, hit, or frightened by anyone at home or in your life? no    Are you being neglected by a caregiver? N/A Shoulder flexibility/Range of motion: Average     Recommended arm activity: Any no restrictions on weight to lift.    Chair sit and reach within: 4 inches   Leg flexibility: Average    Leg Strength/Balance/Five times sit to stand: 10 seconds.     Chose one: Average    Recommended stretching: Standing    Assessment: States he feels weak    Family attends IA: no Time of arrival: 0930  Time of departure: 1035     Patient Goals: To increase energy and stamina.  Wants to lose about 75 pounds over next year.  To be able grocery shop, now is getting very short of air.  Needs to carry the groceries up a 6 foot incline to take them into his house.  May lift up to a 25 pound bag of dog food and carry that into the house. Does meal prep and assists with all the house work.          11/15/2017  10:40 AM  Marce Perez RN

## 2017-11-20 ENCOUNTER — TREATMENT (OUTPATIENT)
Dept: CARDIAC REHAB | Facility: HOSPITAL | Age: 62
End: 2017-11-20

## 2017-11-20 DIAGNOSIS — I21.3 ST ELEVATION MYOCARDIAL INFARCTION (STEMI), UNSPECIFIED ARTERY (HCC): Primary | ICD-10-CM

## 2017-11-20 PROCEDURE — 93798 PHYS/QHP OP CAR RHAB W/ECG: CPT

## 2017-11-27 ENCOUNTER — TREATMENT (OUTPATIENT)
Dept: CARDIAC REHAB | Facility: HOSPITAL | Age: 62
End: 2017-11-27

## 2017-11-27 DIAGNOSIS — I21.3 ST ELEVATION MYOCARDIAL INFARCTION (STEMI), UNSPECIFIED ARTERY (HCC): Primary | ICD-10-CM

## 2017-11-27 DIAGNOSIS — Z95.5 STATUS POST INSERTION OF DRUG ELUTING CORONARY ARTERY STENT: ICD-10-CM

## 2017-11-27 DIAGNOSIS — I21.02 ST ELEVATION MYOCARDIAL INFARCTION INVOLVING LEFT ANTERIOR DESCENDING (LAD) CORONARY ARTERY (HCC): ICD-10-CM

## 2017-11-27 PROCEDURE — 93798 PHYS/QHP OP CAR RHAB W/ECG: CPT

## 2017-11-29 ENCOUNTER — TREATMENT (OUTPATIENT)
Dept: CARDIAC REHAB | Facility: HOSPITAL | Age: 62
End: 2017-11-29

## 2017-11-29 DIAGNOSIS — I21.3 ST ELEVATION MYOCARDIAL INFARCTION (STEMI), UNSPECIFIED ARTERY (HCC): Primary | ICD-10-CM

## 2017-11-29 LAB — GLUCOSE BLDC GLUCOMTR-MCNC: 109 MG/DL (ref 70–130)

## 2017-11-29 PROCEDURE — 93798 PHYS/QHP OP CAR RHAB W/ECG: CPT

## 2017-11-29 PROCEDURE — 82962 GLUCOSE BLOOD TEST: CPT

## 2017-12-01 ENCOUNTER — RESULTS ENCOUNTER (OUTPATIENT)
Dept: INTERNAL MEDICINE | Facility: CLINIC | Age: 62
End: 2017-12-01

## 2017-12-01 ENCOUNTER — TREATMENT (OUTPATIENT)
Dept: CARDIAC REHAB | Facility: HOSPITAL | Age: 62
End: 2017-12-01

## 2017-12-01 DIAGNOSIS — I21.3 ST ELEVATION MYOCARDIAL INFARCTION (STEMI), UNSPECIFIED ARTERY (HCC): Primary | ICD-10-CM

## 2017-12-01 DIAGNOSIS — IMO0001 UNCONTROLLED TYPE 2 DIABETES MELLITUS WITHOUT COMPLICATION, WITHOUT LONG-TERM CURRENT USE OF INSULIN: ICD-10-CM

## 2017-12-01 PROCEDURE — 93798 PHYS/QHP OP CAR RHAB W/ECG: CPT

## 2017-12-04 ENCOUNTER — TREATMENT (OUTPATIENT)
Dept: CARDIAC REHAB | Facility: HOSPITAL | Age: 62
End: 2017-12-04

## 2017-12-04 DIAGNOSIS — I21.09 ANTERIOR MYOCARDIAL INFARCTION (HCC): Primary | ICD-10-CM

## 2017-12-04 PROCEDURE — 93798 PHYS/QHP OP CAR RHAB W/ECG: CPT

## 2017-12-06 ENCOUNTER — TREATMENT (OUTPATIENT)
Dept: CARDIAC REHAB | Facility: HOSPITAL | Age: 62
End: 2017-12-06

## 2017-12-06 DIAGNOSIS — I21.3 ST ELEVATION MYOCARDIAL INFARCTION (STEMI), UNSPECIFIED ARTERY (HCC): ICD-10-CM

## 2017-12-06 DIAGNOSIS — I21.09 ANTERIOR MYOCARDIAL INFARCTION (HCC): Primary | ICD-10-CM

## 2017-12-06 DIAGNOSIS — Z95.5 STATUS POST INSERTION OF DRUG ELUTING CORONARY ARTERY STENT: ICD-10-CM

## 2017-12-06 PROCEDURE — 93798 PHYS/QHP OP CAR RHAB W/ECG: CPT

## 2017-12-08 ENCOUNTER — TREATMENT (OUTPATIENT)
Dept: CARDIAC REHAB | Facility: HOSPITAL | Age: 62
End: 2017-12-08

## 2017-12-08 DIAGNOSIS — I21.02 ST ELEVATION MYOCARDIAL INFARCTION INVOLVING LEFT ANTERIOR DESCENDING (LAD) CORONARY ARTERY (HCC): ICD-10-CM

## 2017-12-08 DIAGNOSIS — Z95.5 STATUS POST INSERTION OF DRUG ELUTING CORONARY ARTERY STENT: ICD-10-CM

## 2017-12-08 DIAGNOSIS — I21.3 ST ELEVATION MYOCARDIAL INFARCTION (STEMI), UNSPECIFIED ARTERY (HCC): Primary | ICD-10-CM

## 2017-12-08 DIAGNOSIS — I21.09 ANTERIOR MYOCARDIAL INFARCTION (HCC): ICD-10-CM

## 2017-12-08 PROCEDURE — 93798 PHYS/QHP OP CAR RHAB W/ECG: CPT

## 2017-12-11 ENCOUNTER — TREATMENT (OUTPATIENT)
Dept: CARDIAC REHAB | Facility: HOSPITAL | Age: 62
End: 2017-12-11

## 2017-12-11 DIAGNOSIS — I21.09 ANTERIOR MYOCARDIAL INFARCTION (HCC): Primary | ICD-10-CM

## 2017-12-11 PROCEDURE — 93798 PHYS/QHP OP CAR RHAB W/ECG: CPT

## 2017-12-11 RX ORDER — GLIMEPIRIDE 4 MG/1
4 TABLET ORAL
Qty: 90 TABLET | Refills: 1 | Status: SHIPPED | OUTPATIENT
Start: 2017-12-11 | End: 2018-07-01 | Stop reason: SDUPTHER

## 2017-12-11 RX ORDER — METFORMIN HYDROCHLORIDE 500 MG/1
500 TABLET, EXTENDED RELEASE ORAL
Qty: 90 TABLET | Refills: 1 | Status: SHIPPED | OUTPATIENT
Start: 2017-12-11 | End: 2018-04-13 | Stop reason: SDUPTHER

## 2017-12-15 ENCOUNTER — TREATMENT (OUTPATIENT)
Dept: CARDIAC REHAB | Facility: HOSPITAL | Age: 62
End: 2017-12-15

## 2017-12-15 DIAGNOSIS — Z95.5 STATUS POST INSERTION OF DRUG ELUTING CORONARY ARTERY STENT: ICD-10-CM

## 2017-12-15 DIAGNOSIS — I21.09 ANTERIOR MYOCARDIAL INFARCTION (HCC): Primary | ICD-10-CM

## 2017-12-15 DIAGNOSIS — I21.3 ST ELEVATION MYOCARDIAL INFARCTION (STEMI), UNSPECIFIED ARTERY (HCC): ICD-10-CM

## 2017-12-15 DIAGNOSIS — I21.02 ST ELEVATION MYOCARDIAL INFARCTION INVOLVING LEFT ANTERIOR DESCENDING (LAD) CORONARY ARTERY (HCC): ICD-10-CM

## 2017-12-15 PROCEDURE — 93798 PHYS/QHP OP CAR RHAB W/ECG: CPT

## 2017-12-18 ENCOUNTER — TREATMENT (OUTPATIENT)
Dept: CARDIAC REHAB | Facility: HOSPITAL | Age: 62
End: 2017-12-18

## 2017-12-18 DIAGNOSIS — Z95.5 STATUS POST INSERTION OF DRUG ELUTING CORONARY ARTERY STENT: ICD-10-CM

## 2017-12-18 DIAGNOSIS — I21.3 ST ELEVATION MYOCARDIAL INFARCTION (STEMI), UNSPECIFIED ARTERY (HCC): ICD-10-CM

## 2017-12-18 DIAGNOSIS — I21.02 ST ELEVATION MYOCARDIAL INFARCTION INVOLVING LEFT ANTERIOR DESCENDING (LAD) CORONARY ARTERY (HCC): ICD-10-CM

## 2017-12-18 DIAGNOSIS — I21.09 ANTERIOR MYOCARDIAL INFARCTION (HCC): Primary | ICD-10-CM

## 2017-12-18 PROCEDURE — 93798 PHYS/QHP OP CAR RHAB W/ECG: CPT

## 2017-12-20 ENCOUNTER — TREATMENT (OUTPATIENT)
Dept: CARDIAC REHAB | Facility: HOSPITAL | Age: 62
End: 2017-12-20

## 2017-12-20 DIAGNOSIS — I21.02 ST ELEVATION MYOCARDIAL INFARCTION INVOLVING LEFT ANTERIOR DESCENDING (LAD) CORONARY ARTERY (HCC): ICD-10-CM

## 2017-12-20 DIAGNOSIS — I21.3 ST ELEVATION MYOCARDIAL INFARCTION (STEMI), UNSPECIFIED ARTERY (HCC): ICD-10-CM

## 2017-12-20 DIAGNOSIS — Z95.5 STATUS POST INSERTION OF DRUG ELUTING CORONARY ARTERY STENT: ICD-10-CM

## 2017-12-20 DIAGNOSIS — I21.09 ANTERIOR MYOCARDIAL INFARCTION (HCC): Primary | ICD-10-CM

## 2017-12-20 PROCEDURE — 93798 PHYS/QHP OP CAR RHAB W/ECG: CPT

## 2017-12-27 ENCOUNTER — TREATMENT (OUTPATIENT)
Dept: CARDIAC REHAB | Facility: HOSPITAL | Age: 62
End: 2017-12-27

## 2017-12-27 DIAGNOSIS — I21.3 ST ELEVATION MYOCARDIAL INFARCTION (STEMI), UNSPECIFIED ARTERY (HCC): ICD-10-CM

## 2017-12-27 DIAGNOSIS — Z95.5 STATUS POST INSERTION OF DRUG ELUTING CORONARY ARTERY STENT: ICD-10-CM

## 2017-12-27 DIAGNOSIS — I21.02 ST ELEVATION MYOCARDIAL INFARCTION INVOLVING LEFT ANTERIOR DESCENDING (LAD) CORONARY ARTERY (HCC): ICD-10-CM

## 2017-12-27 DIAGNOSIS — I21.09 ANTERIOR MYOCARDIAL INFARCTION (HCC): Primary | ICD-10-CM

## 2017-12-27 PROCEDURE — 93798 PHYS/QHP OP CAR RHAB W/ECG: CPT

## 2017-12-29 ENCOUNTER — TREATMENT (OUTPATIENT)
Dept: CARDIAC REHAB | Facility: HOSPITAL | Age: 62
End: 2017-12-29

## 2017-12-29 DIAGNOSIS — I21.09 ANTERIOR MYOCARDIAL INFARCTION (HCC): Primary | ICD-10-CM

## 2017-12-29 PROCEDURE — 93798 PHYS/QHP OP CAR RHAB W/ECG: CPT

## 2018-01-03 ENCOUNTER — APPOINTMENT (OUTPATIENT)
Dept: CARDIAC REHAB | Facility: HOSPITAL | Age: 63
End: 2018-01-03

## 2018-01-05 ENCOUNTER — APPOINTMENT (OUTPATIENT)
Dept: CARDIAC REHAB | Facility: HOSPITAL | Age: 63
End: 2018-01-05

## 2018-01-08 ENCOUNTER — APPOINTMENT (OUTPATIENT)
Dept: CARDIAC REHAB | Facility: HOSPITAL | Age: 63
End: 2018-01-08

## 2018-01-10 ENCOUNTER — APPOINTMENT (OUTPATIENT)
Dept: CARDIAC REHAB | Facility: HOSPITAL | Age: 63
End: 2018-01-10

## 2018-01-12 ENCOUNTER — APPOINTMENT (OUTPATIENT)
Dept: CARDIAC REHAB | Facility: HOSPITAL | Age: 63
End: 2018-01-12

## 2018-01-15 ENCOUNTER — APPOINTMENT (OUTPATIENT)
Dept: CARDIAC REHAB | Facility: HOSPITAL | Age: 63
End: 2018-01-15

## 2018-01-17 ENCOUNTER — APPOINTMENT (OUTPATIENT)
Dept: CARDIAC REHAB | Facility: HOSPITAL | Age: 63
End: 2018-01-17

## 2018-01-19 ENCOUNTER — APPOINTMENT (OUTPATIENT)
Dept: CARDIAC REHAB | Facility: HOSPITAL | Age: 63
End: 2018-01-19

## 2018-01-22 ENCOUNTER — APPOINTMENT (OUTPATIENT)
Dept: CARDIAC REHAB | Facility: HOSPITAL | Age: 63
End: 2018-01-22

## 2018-01-24 ENCOUNTER — APPOINTMENT (OUTPATIENT)
Dept: CARDIAC REHAB | Facility: HOSPITAL | Age: 63
End: 2018-01-24

## 2018-01-26 ENCOUNTER — APPOINTMENT (OUTPATIENT)
Dept: CARDIAC REHAB | Facility: HOSPITAL | Age: 63
End: 2018-01-26

## 2018-01-29 ENCOUNTER — APPOINTMENT (OUTPATIENT)
Dept: CARDIAC REHAB | Facility: HOSPITAL | Age: 63
End: 2018-01-29

## 2018-01-31 ENCOUNTER — APPOINTMENT (OUTPATIENT)
Dept: CARDIAC REHAB | Facility: HOSPITAL | Age: 63
End: 2018-01-31

## 2018-02-02 ENCOUNTER — APPOINTMENT (OUTPATIENT)
Dept: CARDIAC REHAB | Facility: HOSPITAL | Age: 63
End: 2018-02-02

## 2018-02-05 ENCOUNTER — APPOINTMENT (OUTPATIENT)
Dept: CARDIAC REHAB | Facility: HOSPITAL | Age: 63
End: 2018-02-05

## 2018-02-07 ENCOUNTER — APPOINTMENT (OUTPATIENT)
Dept: CARDIAC REHAB | Facility: HOSPITAL | Age: 63
End: 2018-02-07

## 2018-02-09 ENCOUNTER — APPOINTMENT (OUTPATIENT)
Dept: CARDIAC REHAB | Facility: HOSPITAL | Age: 63
End: 2018-02-09

## 2018-02-12 ENCOUNTER — APPOINTMENT (OUTPATIENT)
Dept: CARDIAC REHAB | Facility: HOSPITAL | Age: 63
End: 2018-02-12

## 2018-02-14 ENCOUNTER — APPOINTMENT (OUTPATIENT)
Dept: CARDIAC REHAB | Facility: HOSPITAL | Age: 63
End: 2018-02-14

## 2018-02-16 ENCOUNTER — APPOINTMENT (OUTPATIENT)
Dept: CARDIAC REHAB | Facility: HOSPITAL | Age: 63
End: 2018-02-16

## 2018-02-19 ENCOUNTER — APPOINTMENT (OUTPATIENT)
Dept: CARDIAC REHAB | Facility: HOSPITAL | Age: 63
End: 2018-02-19

## 2018-03-23 ENCOUNTER — OFFICE VISIT (OUTPATIENT)
Dept: INTERNAL MEDICINE | Facility: CLINIC | Age: 63
End: 2018-03-23

## 2018-03-23 VITALS
HEART RATE: 73 BPM | DIASTOLIC BLOOD PRESSURE: 66 MMHG | TEMPERATURE: 96.7 F | SYSTOLIC BLOOD PRESSURE: 112 MMHG | OXYGEN SATURATION: 98 % | BODY MASS INDEX: 41.35 KG/M2 | WEIGHT: 280 LBS

## 2018-03-23 DIAGNOSIS — E78.2 MIXED HYPERLIPIDEMIA: Primary | ICD-10-CM

## 2018-03-23 DIAGNOSIS — I25.10 CORONARY ARTERY DISEASE INVOLVING NATIVE CORONARY ARTERY OF NATIVE HEART WITHOUT ANGINA PECTORIS: ICD-10-CM

## 2018-03-23 DIAGNOSIS — E66.9 OBESITY (BMI 30.0-34.9): ICD-10-CM

## 2018-03-23 DIAGNOSIS — I50.22 CHRONIC SYSTOLIC CONGESTIVE HEART FAILURE, NYHA CLASS 1 (HCC): ICD-10-CM

## 2018-03-23 DIAGNOSIS — E11.9 TYPE 2 DIABETES MELLITUS WITHOUT COMPLICATION, WITHOUT LONG-TERM CURRENT USE OF INSULIN (HCC): ICD-10-CM

## 2018-03-23 PROCEDURE — 99213 OFFICE O/P EST LOW 20 MIN: CPT | Performed by: FAMILY MEDICINE

## 2018-03-23 NOTE — PROGRESS NOTES
Subjective   Joshua Wolf is a 62 y.o. male.     Chief Complaint   Patient presents with   • Diabetes   • Coronary Artery Disease         History of Present Illness   Delightful gentleman here with a history of CAD and cardiomyopathy which is improved.  He is now exercising 3 days a week.  We discussed rechecking labs in reference to his diabetes type 2.      The following portions of the patient's history were reviewed and updated as appropriate: allergies, current medications, past social history and problem list.    Review of Systems   Constitutional: Positive for fatigue.   HENT: Negative.    Eyes: Negative.    Respiratory: Negative.    Cardiovascular: Negative.    Gastrointestinal: Negative.    Endocrine: Negative.    Genitourinary: Negative.  Negative for difficulty urinating.   Musculoskeletal: Negative.    Skin: Negative.    Allergic/Immunologic: Negative.    Neurological: Negative.    Hematological: Negative.    Psychiatric/Behavioral: Negative.        Objective   Vitals:    03/23/18 1607   BP: 112/66   Pulse: 73   Temp: 96.7 °F (35.9 °C)   SpO2: 98%     Physical Exam   Constitutional: He is oriented to person, place, and time.   HENT:   Head: Normocephalic.   Right Ear: External ear normal.   Left Ear: External ear normal.   Mouth/Throat: Oropharynx is clear and moist.   Eyes: Conjunctivae and EOM are normal. Pupils are equal, round, and reactive to light.   Neck: Normal range of motion. Neck supple.   Cardiovascular: Normal rate, regular rhythm and normal heart sounds.    Pulmonary/Chest: Effort normal and breath sounds normal.   Abdominal: Soft. Bowel sounds are normal.   Musculoskeletal: Normal range of motion.   Neurological: He is alert and oriented to person, place, and time.   Skin: Skin is warm and dry.   Psychiatric: He has a normal mood and affect.       Assessment/Plan   Problem List Items Addressed This Visit        Cardiovascular and Mediastinum    CHF (congestive heart failure), NYHA class  I    Relevant Orders    CBC & Differential    Comprehensive Metabolic Panel    Hemoglobin A1c    Lipid Panel With / Chol / HDL Ratio    TSH    Urinalysis With / Microscopic If Indicated - Urine, Clean Catch    Vitamin D 25 Hydroxy    Hyperlipidemia - Primary    Relevant Orders    CBC & Differential    Comprehensive Metabolic Panel    Hemoglobin A1c    Lipid Panel With / Chol / HDL Ratio    TSH    Urinalysis With / Microscopic If Indicated - Urine, Clean Catch    Vitamin D 25 Hydroxy    Coronary artery disease    Relevant Orders    CBC & Differential    Comprehensive Metabolic Panel    Hemoglobin A1c    Lipid Panel With / Chol / HDL Ratio    TSH    Urinalysis With / Microscopic If Indicated - Urine, Clean Catch    Vitamin D 25 Hydroxy       Digestive    Obesity (BMI 30.0-34.9)    Relevant Orders    CBC & Differential    Comprehensive Metabolic Panel    Hemoglobin A1c    Lipid Panel With / Chol / HDL Ratio    TSH    Urinalysis With / Microscopic If Indicated - Urine, Clean Catch    Vitamin D 25 Hydroxy       Endocrine    Type 2 diabetes mellitus without complication    Relevant Orders    CBC & Differential    Comprehensive Metabolic Panel    Hemoglobin A1c    Lipid Panel With / Chol / HDL Ratio    TSH    Urinalysis With / Microscopic If Indicated - Urine, Clean Catch    Vitamin D 25 Hydroxy      Other Visit Diagnoses    None.     Plan: He'll return for fasting labs meds are unchanged.  We'll see him back in about 5 months for recheck.

## 2018-04-02 LAB
25(OH)D3+25(OH)D2 SERPL-MCNC: 17.9 NG/ML (ref 30–100)
ALBUMIN SERPL-MCNC: 4.1 G/DL (ref 3.5–5.2)
ALBUMIN/GLOB SERPL: 1.9 G/DL
ALP SERPL-CCNC: 80 U/L (ref 39–117)
ALT SERPL-CCNC: 21 U/L (ref 1–41)
APPEARANCE UR: CLEAR
AST SERPL-CCNC: 14 U/L (ref 1–40)
BACTERIA #/AREA URNS HPF: NORMAL /HPF
BASOPHILS # BLD AUTO: 0.04 10*3/MM3 (ref 0–0.2)
BASOPHILS NFR BLD AUTO: 0.5 % (ref 0–1.5)
BILIRUB SERPL-MCNC: 1.1 MG/DL (ref 0.1–1.2)
BILIRUB UR QL STRIP: NEGATIVE
BUN SERPL-MCNC: 28 MG/DL (ref 8–23)
BUN/CREAT SERPL: 17.1 (ref 7–25)
CALCIUM SERPL-MCNC: 8.9 MG/DL (ref 8.6–10.5)
CASTS URNS MICRO: NORMAL
CHLORIDE SERPL-SCNC: 103 MMOL/L (ref 98–107)
CHOLEST SERPL-MCNC: 154 MG/DL (ref 0–200)
CHOLEST/HDLC SERPL: 3.85 {RATIO}
CO2 SERPL-SCNC: 25.2 MMOL/L (ref 22–29)
COLOR UR: ABNORMAL
CREAT SERPL-MCNC: 1.64 MG/DL (ref 0.76–1.27)
EOSINOPHIL # BLD AUTO: 0.29 10*3/MM3 (ref 0–0.7)
EOSINOPHIL NFR BLD AUTO: 3.6 % (ref 0.3–6.2)
EPI CELLS #/AREA URNS HPF: NORMAL /HPF
ERYTHROCYTE [DISTWIDTH] IN BLOOD BY AUTOMATED COUNT: 13.3 % (ref 11.5–14.5)
GFR SERPLBLD CREATININE-BSD FMLA CKD-EPI: 43 ML/MIN/1.73
GFR SERPLBLD CREATININE-BSD FMLA CKD-EPI: 52 ML/MIN/1.73
GLOBULIN SER CALC-MCNC: 2.2 GM/DL
GLUCOSE SERPL-MCNC: 204 MG/DL (ref 65–99)
GLUCOSE UR QL: NEGATIVE
HBA1C MFR BLD: 7.14 % (ref 4.8–5.6)
HCT VFR BLD AUTO: 42.5 % (ref 40.4–52.2)
HDLC SERPL-MCNC: 40 MG/DL (ref 40–60)
HGB BLD-MCNC: 14.4 G/DL (ref 13.7–17.6)
HGB UR QL STRIP: NEGATIVE
IMM GRANULOCYTES # BLD: 0.05 10*3/MM3 (ref 0–0.03)
IMM GRANULOCYTES NFR BLD: 0.6 % (ref 0–0.5)
KETONES UR QL STRIP: ABNORMAL
LDLC SERPL CALC-MCNC: 92 MG/DL (ref 0–100)
LEUKOCYTE ESTERASE UR QL STRIP: NEGATIVE
LYMPHOCYTES # BLD AUTO: 1.1 10*3/MM3 (ref 0.9–4.8)
LYMPHOCYTES NFR BLD AUTO: 13.6 % (ref 19.6–45.3)
MCH RBC QN AUTO: 32.3 PG (ref 27–32.7)
MCHC RBC AUTO-ENTMCNC: 33.9 G/DL (ref 32.6–36.4)
MCV RBC AUTO: 95.3 FL (ref 79.8–96.2)
MONOCYTES # BLD AUTO: 0.63 10*3/MM3 (ref 0.2–1.2)
MONOCYTES NFR BLD AUTO: 7.8 % (ref 5–12)
NEUTROPHILS # BLD AUTO: 5.97 10*3/MM3 (ref 1.9–8.1)
NEUTROPHILS NFR BLD AUTO: 73.9 % (ref 42.7–76)
NITRITE UR QL STRIP: NEGATIVE
PH UR STRIP: 5.5 [PH] (ref 5–8)
PLATELET # BLD AUTO: 167 10*3/MM3 (ref 140–500)
POTASSIUM SERPL-SCNC: 5.2 MMOL/L (ref 3.5–5.2)
PROT SERPL-MCNC: 6.3 G/DL (ref 6–8.5)
PROT UR QL STRIP: ABNORMAL
RBC # BLD AUTO: 4.46 10*6/MM3 (ref 4.6–6)
RBC #/AREA URNS HPF: NORMAL /HPF
SODIUM SERPL-SCNC: 140 MMOL/L (ref 136–145)
SP GR UR: 1.03 (ref 1–1.03)
TRIGL SERPL-MCNC: 111 MG/DL (ref 0–150)
TSH SERPL DL<=0.005 MIU/L-ACNC: 4.04 MIU/ML (ref 0.27–4.2)
UROBILINOGEN UR STRIP-MCNC: ABNORMAL MG/DL
VLDLC SERPL CALC-MCNC: 22.2 MG/DL (ref 5–40)
WBC # BLD AUTO: 8.08 10*3/MM3 (ref 4.5–10.7)
WBC #/AREA URNS HPF: NORMAL /HPF

## 2018-04-03 ENCOUNTER — OFFICE VISIT (OUTPATIENT)
Dept: INTERNAL MEDICINE | Facility: CLINIC | Age: 63
End: 2018-04-03

## 2018-04-03 VITALS
TEMPERATURE: 97.3 F | RESPIRATION RATE: 16 BRPM | OXYGEN SATURATION: 98 % | SYSTOLIC BLOOD PRESSURE: 140 MMHG | DIASTOLIC BLOOD PRESSURE: 89 MMHG | HEART RATE: 83 BPM | WEIGHT: 280.4 LBS | HEIGHT: 69 IN | BODY MASS INDEX: 41.53 KG/M2

## 2018-04-03 DIAGNOSIS — L98.9 SKIN LESION: Primary | ICD-10-CM

## 2018-04-03 PROCEDURE — 99213 OFFICE O/P EST LOW 20 MIN: CPT | Performed by: NURSE PRACTITIONER

## 2018-04-03 NOTE — PROGRESS NOTES
Subjective   Joshua Wolf is a 62 y.o. male.     Chief Complaint   Patient presents with   • Skin Lesion     Right Arm         Mr. Wolf is a known patient to our practice here today for evaluation of right anterior forearm lesion.  He reports that this is been there for several months and has begun to itch.  He has picked at it some causing some minute scabbing.  He reports that this is the only lesion that he is aware of.  Lesion measuring 1 cm x 1 cm.  Reports there is no pain from the lesion.  No other exacerbating or aggravating symptoms.  He has not tried any over-the-counter lotions or anti-inflammatories on the lesion either.             The following portions of the patient's history were reviewed and updated as appropriate: allergies, current medications, past social history and problem list.    Review of Systems   Constitutional: Negative.    HENT: Negative.    Eyes: Negative.    Respiratory: Negative.    Endocrine: Negative.    Genitourinary: Negative.    Skin:        Right forearm lesion     Allergic/Immunologic: Negative.        Objective   Vitals:    04/03/18 0920   BP: 140/89   Pulse: 83   Resp: 16   Temp: 97.3 °F (36.3 °C)   SpO2: 98%     Physical Exam   Constitutional: He is oriented to person, place, and time. He appears well-nourished.   HENT:   Head: Normocephalic.   Right Ear: External ear normal.   Left Ear: External ear normal.   Nose: Nose normal.   Mouth/Throat: Oropharynx is clear and moist.   Eyes: Conjunctivae and EOM are normal. Pupils are equal, round, and reactive to light.   Neck: Normal range of motion.   Cardiovascular: Normal rate, regular rhythm and normal heart sounds.    Pulmonary/Chest: Effort normal and breath sounds normal.   Musculoskeletal: Normal range of motion.   Neurological: He is alert and oriented to person, place, and time.   Skin: Skin is warm and dry. Capillary refill takes 2 to 3 seconds.   Right anterior forearm lesion.    Psychiatric: He has a normal mood  and affect. His behavior is normal. Judgment and thought content normal.       Assessment/Plan   Problem List Items Addressed This Visit     Skin lesion - Primary      Other Visit Diagnoses    None.            I suspect this to be an area of psoriasis.  Will refer to derm for further evaluation.

## 2018-04-04 RX ORDER — ERGOCALCIFEROL 1.25 MG/1
50000 CAPSULE ORAL WEEKLY
Qty: 8 CAPSULE | Refills: 0 | Status: SHIPPED | OUTPATIENT
Start: 2018-04-04 | End: 2018-08-24

## 2018-04-13 RX ORDER — METFORMIN HYDROCHLORIDE 500 MG/1
500 TABLET, EXTENDED RELEASE ORAL
Qty: 90 TABLET | Refills: 1 | Status: SHIPPED | OUTPATIENT
Start: 2018-04-13 | End: 2018-08-24

## 2018-07-02 RX ORDER — GLIMEPIRIDE 4 MG/1
TABLET ORAL
Qty: 90 TABLET | Refills: 1 | Status: SHIPPED | OUTPATIENT
Start: 2018-07-02 | End: 2018-12-05 | Stop reason: SDUPTHER

## 2018-08-24 ENCOUNTER — OFFICE VISIT (OUTPATIENT)
Dept: INTERNAL MEDICINE | Facility: CLINIC | Age: 63
End: 2018-08-24

## 2018-08-24 VITALS
OXYGEN SATURATION: 98 % | SYSTOLIC BLOOD PRESSURE: 102 MMHG | BODY MASS INDEX: 40.76 KG/M2 | TEMPERATURE: 97.4 F | WEIGHT: 276 LBS | HEART RATE: 60 BPM | DIASTOLIC BLOOD PRESSURE: 74 MMHG

## 2018-08-24 DIAGNOSIS — E78.2 MIXED HYPERLIPIDEMIA: Primary | ICD-10-CM

## 2018-08-24 DIAGNOSIS — I25.10 CORONARY ARTERY DISEASE INVOLVING NATIVE CORONARY ARTERY OF NATIVE HEART WITHOUT ANGINA PECTORIS: ICD-10-CM

## 2018-08-24 DIAGNOSIS — E11.9 TYPE 2 DIABETES MELLITUS WITHOUT COMPLICATION, WITHOUT LONG-TERM CURRENT USE OF INSULIN (HCC): ICD-10-CM

## 2018-08-24 PROCEDURE — 99213 OFFICE O/P EST LOW 20 MIN: CPT | Performed by: FAMILY MEDICINE

## 2018-08-24 RX ORDER — METFORMIN HYDROCHLORIDE 500 MG/1
1000 TABLET, EXTENDED RELEASE ORAL
COMMUNITY
End: 2019-03-04 | Stop reason: DRUGHIGH

## 2018-08-24 NOTE — PROGRESS NOTES
Subjective   Joshua Wolf is a 63 y.o. male.     Chief Complaint   Patient presents with   • Hyperlipidemia   • Coronary Artery Disease   • Diabetes         History of Present Illness   The patient has made some remarkable progress as far as his status.  He has a  his exercise were regimen looks very excellent.  Medications appear stable he has close follow-up with nephrology and also cardiology.  Dr. Hughes will get labs.      The following portions of the patient's history were reviewed and updated as appropriate: allergies, current medications, past social history and problem list.    Review of Systems   Constitutional: Negative.    HENT: Negative.    Eyes: Negative.    Respiratory: Negative.    Cardiovascular: Negative.    Endocrine: Negative.    Genitourinary: Negative.    Musculoskeletal: Negative.    Skin: Negative.    Neurological: Negative.    Hematological: Negative.    Psychiatric/Behavioral: Negative.        Objective   Vitals:    08/24/18 1509   BP: 102/74   Pulse: 60   Temp: 97.4 °F (36.3 °C)   SpO2: 98%     Physical Exam   Constitutional: He is oriented to person, place, and time. He appears well-developed.   HENT:   Head: Normocephalic.   Right Ear: External ear normal.   Left Ear: External ear normal.   Mouth/Throat: Oropharynx is clear and moist.   Eyes: Pupils are equal, round, and reactive to light.   Neck: Normal range of motion. Neck supple.   Cardiovascular: Normal rate, regular rhythm and normal heart sounds.    Pulmonary/Chest: Effort normal and breath sounds normal.   Abdominal: Soft. Bowel sounds are normal.   Musculoskeletal: Normal range of motion.   Neurological: He is alert and oriented to person, place, and time.   Skin: Skin is warm and dry.   Psychiatric: He has a normal mood and affect.   Vitals reviewed.      Assessment/Plan   Problem List Items Addressed This Visit        Cardiovascular and Mediastinum    Hyperlipidemia - Primary    Coronary artery disease        Endocrine    Type 2 diabetes mellitus without complication (CMS/HCC)    Relevant Medications    metFORMIN ER (GLUCOPHAGE XR) 500 MG 24 hr tablet      By all metrics the patient patient is stable.  We are close follow-up with cardiology will see him back in 4 months..

## 2018-12-05 RX ORDER — GLIMEPIRIDE 4 MG/1
TABLET ORAL
Qty: 90 TABLET | Refills: 1 | Status: SHIPPED | OUTPATIENT
Start: 2018-12-05 | End: 2019-06-26 | Stop reason: SDUPTHER

## 2018-12-27 RX ORDER — METFORMIN HYDROCHLORIDE 500 MG/1
TABLET, EXTENDED RELEASE ORAL
Qty: 90 TABLET | Refills: 1 | Status: SHIPPED | OUTPATIENT
Start: 2018-12-27 | End: 2019-03-04 | Stop reason: DRUGHIGH

## 2019-03-04 ENCOUNTER — OFFICE VISIT (OUTPATIENT)
Dept: INTERNAL MEDICINE | Facility: CLINIC | Age: 64
End: 2019-03-04

## 2019-03-04 VITALS
OXYGEN SATURATION: 97 % | WEIGHT: 268 LBS | HEART RATE: 60 BPM | BODY MASS INDEX: 39.58 KG/M2 | DIASTOLIC BLOOD PRESSURE: 72 MMHG | SYSTOLIC BLOOD PRESSURE: 104 MMHG | TEMPERATURE: 95.7 F

## 2019-03-04 DIAGNOSIS — E78.2 MIXED HYPERLIPIDEMIA: ICD-10-CM

## 2019-03-04 DIAGNOSIS — N18.30 STAGE 3 CHRONIC KIDNEY DISEASE (HCC): ICD-10-CM

## 2019-03-04 DIAGNOSIS — I50.20 SYSTOLIC CONGESTIVE HEART FAILURE, NYHA CLASS 1, UNSPECIFIED CONGESTIVE HEART FAILURE CHRONICITY (HCC): ICD-10-CM

## 2019-03-04 DIAGNOSIS — E11.9 CONTROLLED TYPE 2 DIABETES MELLITUS WITHOUT COMPLICATION, WITHOUT LONG-TERM CURRENT USE OF INSULIN (HCC): ICD-10-CM

## 2019-03-04 DIAGNOSIS — I25.10 CORONARY ARTERY DISEASE INVOLVING NATIVE CORONARY ARTERY OF NATIVE HEART WITHOUT ANGINA PECTORIS: Primary | ICD-10-CM

## 2019-03-04 PROCEDURE — 99214 OFFICE O/P EST MOD 30 MIN: CPT | Performed by: FAMILY MEDICINE

## 2019-03-04 RX ORDER — METFORMIN HYDROCHLORIDE 500 MG/1
1000 TABLET, EXTENDED RELEASE ORAL
Qty: 180 TABLET | Refills: 3 | Status: SHIPPED | OUTPATIENT
Start: 2019-03-04 | End: 2019-10-31 | Stop reason: SDUPTHER

## 2019-03-04 NOTE — PROGRESS NOTES
Subjective   Joshua Wolf is a 63 y.o. male.     Chief Complaint   Patient presents with   • Diabetes   • Coronary Artery Disease   • Hypertension   • Hyperlipidemia         History of Present Illness   Patient with a history of significant CAD ischemic cardiomyopathy which is improving he has working out with a  and losing weight steadily.  He has some degree of renal insufficiency and chronic kidney disease stage III.  His previous GFR was borderline at 43 and I am having him follow-up with Dr. Lucia nephrology to see if he is okay with continuing metformin at his current dose.  Otherwise he is on glimepiride as well.  Consideration will be discontinuance of metformin continue glimepiride and adding Januvia.  Otherwise labs will be done preceding visit with nephrology.      The following portions of the patient's history were reviewed and updated as appropriate: allergies, current medications, past social history and problem list.    Review of Systems   Constitutional: Negative.    HENT: Negative.    Eyes: Negative.    Respiratory: Negative.    Cardiovascular: Negative.    Gastrointestinal: Negative.    Endocrine: Negative.    Genitourinary: Negative.    Musculoskeletal: Negative.    Skin: Negative.    Allergic/Immunologic: Negative.    Neurological: Negative.    Hematological: Negative.    Psychiatric/Behavioral: Negative.        Objective   Vitals:    03/04/19 1720   BP: 104/72   Pulse: 60   Temp: 95.7 °F (35.4 °C)   SpO2: 97%     Physical Exam   Constitutional: He is oriented to person, place, and time. He appears well-developed and well-nourished.   HENT:   Head: Normocephalic and atraumatic.   Right Ear: Tympanic membrane and external ear normal.   Left Ear: Tympanic membrane and external ear normal.   Nose: Nose normal.   Mouth/Throat: Oropharynx is clear and moist.   Eyes: Conjunctivae and EOM are normal. Pupils are equal, round, and reactive to light.   Neck: Normal range of motion. Neck supple.  No JVD present. No thyromegaly present.   Cardiovascular: Normal rate, regular rhythm, normal heart sounds and intact distal pulses.   Pulmonary/Chest: Effort normal and breath sounds normal.   Abdominal: Soft. Bowel sounds are normal.   Musculoskeletal: Normal range of motion.   Lymphadenopathy:     He has no cervical adenopathy.   Neurological: He is alert and oriented to person, place, and time. No cranial nerve deficit. Coordination normal.   Skin: Skin is warm and dry. No rash noted.   Psychiatric: He has a normal mood and affect. His behavior is normal. Judgment and thought content normal.   Vitals reviewed.      Assessment/Plan   Problem List Items Addressed This Visit        Cardiovascular and Mediastinum    CHF (congestive heart failure), NYHA class I (CMS/[facility])    Relevant Orders    CBC & Differential    Comprehensive Metabolic Panel    Vitamin D 25 Hydroxy    Lipid Panel With / Chol / HDL Ratio    Hemoglobin A1c    Phosphorus    PTH, Intact    Uric Acid    Protein / Creatinine Ratio, Urine - Urine, Clean Catch    Hyperlipidemia    Relevant Orders    CBC & Differential    Comprehensive Metabolic Panel    Vitamin D 25 Hydroxy    Lipid Panel With / Chol / HDL Ratio    Hemoglobin A1c    Phosphorus    PTH, Intact    Uric Acid    Protein / Creatinine Ratio, Urine - Urine, Clean Catch    Coronary artery disease - Primary    Relevant Orders    CBC & Differential    Comprehensive Metabolic Panel    Vitamin D 25 Hydroxy    Lipid Panel With / Chol / HDL Ratio    Hemoglobin A1c    Phosphorus    PTH, Intact    Uric Acid    Protein / Creatinine Ratio, Urine - Urine, Clean Catch      Other Visit Diagnoses     Stage 3 chronic kidney disease (CMS/[facility])        Relevant Orders    CBC & Differential    Comprehensive Metabolic Panel    Vitamin D 25 Hydroxy    Lipid Panel With / Chol / HDL Ratio    Hemoglobin A1c    Phosphorus    PTH, Intact    Uric Acid    Protein / Creatinine Ratio, Urine - Urine, Clean Catch    Controlled  type 2 diabetes mellitus without complication, without long-term current use of insulin (CMS/[facility])        Relevant Medications    metFORMIN ER (GLUCOPHAGE XR) 500 MG 24 hr tablet    Other Relevant Orders    CBC & Differential    Comprehensive Metabolic Panel    Vitamin D 25 Hydroxy    Lipid Panel With / Chol / HDL Ratio    Hemoglobin A1c    Phosphorus    PTH, Intact    Uric Acid    Protein / Creatinine Ratio, Urine - Urine, Clean Catch      Refill of metformin Exar thousand milligrams every morning.  Follow-up with nephrology for consideration of review of kidney function along with dose of metformin.  Recheck in 6 months.

## 2019-03-06 LAB
25(OH)D3+25(OH)D2 SERPL-MCNC: 11.1 NG/ML (ref 30–100)
ALBUMIN SERPL-MCNC: 4.4 G/DL (ref 3.5–5.2)
ALBUMIN/GLOB SERPL: 1.8 G/DL
ALP SERPL-CCNC: 79 U/L (ref 39–117)
ALT SERPL-CCNC: 45 U/L (ref 1–41)
AST SERPL-CCNC: 22 U/L (ref 1–40)
BASOPHILS # BLD AUTO: 0.03 10*3/MM3 (ref 0–0.2)
BASOPHILS NFR BLD AUTO: 0.4 % (ref 0–1.5)
BILIRUB SERPL-MCNC: 2.6 MG/DL (ref 0.1–1.2)
BUN SERPL-MCNC: 26 MG/DL (ref 8–23)
BUN/CREAT SERPL: 14.4 (ref 7–25)
CALCIUM SERPL-MCNC: 9.6 MG/DL (ref 8.6–10.5)
CHLORIDE SERPL-SCNC: 95 MMOL/L (ref 98–107)
CHOLEST SERPL-MCNC: 130 MG/DL (ref 0–200)
CHOLEST/HDLC SERPL: 3.51 {RATIO}
CO2 SERPL-SCNC: 24.7 MMOL/L (ref 22–29)
CREAT SERPL-MCNC: 1.8 MG/DL (ref 0.76–1.27)
EOSINOPHIL # BLD AUTO: 0.22 10*3/MM3 (ref 0–0.4)
EOSINOPHIL NFR BLD AUTO: 3 % (ref 0.3–6.2)
ERYTHROCYTE [DISTWIDTH] IN BLOOD BY AUTOMATED COUNT: 12.7 % (ref 12.3–15.4)
GLOBULIN SER CALC-MCNC: 2.5 GM/DL
GLUCOSE SERPL-MCNC: 269 MG/DL (ref 65–99)
HBA1C MFR BLD: 12.4 % (ref 4.8–5.6)
HCT VFR BLD AUTO: 46.3 % (ref 37.5–51)
HDLC SERPL-MCNC: 37 MG/DL (ref 40–60)
HGB BLD-MCNC: 15.4 G/DL (ref 13–17.7)
IMM GRANULOCYTES # BLD AUTO: 0.02 10*3/MM3 (ref 0–0.05)
IMM GRANULOCYTES NFR BLD AUTO: 0.3 % (ref 0–0.5)
LDLC SERPL CALC-MCNC: 67 MG/DL (ref 0–100)
LYMPHOCYTES # BLD AUTO: 1.14 10*3/MM3 (ref 0.7–3.1)
LYMPHOCYTES NFR BLD AUTO: 15.6 % (ref 19.6–45.3)
MCH RBC QN AUTO: 30.8 PG (ref 26.6–33)
MCHC RBC AUTO-ENTMCNC: 33.3 G/DL (ref 31.5–35.7)
MCV RBC AUTO: 92.6 FL (ref 79–97)
MONOCYTES # BLD AUTO: 0.78 10*3/MM3 (ref 0.1–0.9)
MONOCYTES NFR BLD AUTO: 10.7 % (ref 5–12)
NEUTROPHILS # BLD AUTO: 5.11 10*3/MM3 (ref 1.4–7)
NEUTROPHILS NFR BLD AUTO: 70 % (ref 42.7–76)
NRBC BLD AUTO-RTO: 0 /100 WBC (ref 0–0)
PHOSPHATE SERPL-MCNC: 3.7 MG/DL (ref 2.5–4.5)
PLATELET # BLD AUTO: 171 10*3/MM3 (ref 140–450)
POTASSIUM SERPL-SCNC: 4.9 MMOL/L (ref 3.5–5.2)
PROT SERPL-MCNC: 6.9 G/DL (ref 6–8.5)
PTH-INTACT SERPL-MCNC: 66 PG/ML (ref 15–65)
RBC # BLD AUTO: 5 10*6/MM3 (ref 4.14–5.8)
SODIUM SERPL-SCNC: 136 MMOL/L (ref 136–145)
TRIGL SERPL-MCNC: 129 MG/DL (ref 0–150)
URATE SERPL-MCNC: 7 MG/DL (ref 3.4–7)
VLDLC SERPL CALC-MCNC: 25.8 MG/DL (ref 5–40)
WBC # BLD AUTO: 7.3 10*3/MM3 (ref 3.4–10.8)

## 2019-03-08 ENCOUNTER — RESULTS ENCOUNTER (OUTPATIENT)
Dept: INTERNAL MEDICINE | Facility: CLINIC | Age: 64
End: 2019-03-08

## 2019-03-08 DIAGNOSIS — I25.10 CORONARY ARTERY DISEASE INVOLVING NATIVE CORONARY ARTERY OF NATIVE HEART WITHOUT ANGINA PECTORIS: ICD-10-CM

## 2019-03-08 DIAGNOSIS — I50.20 SYSTOLIC CONGESTIVE HEART FAILURE, NYHA CLASS 1, UNSPECIFIED CONGESTIVE HEART FAILURE CHRONICITY (HCC): ICD-10-CM

## 2019-03-08 DIAGNOSIS — N18.30 STAGE 3 CHRONIC KIDNEY DISEASE (HCC): ICD-10-CM

## 2019-03-08 DIAGNOSIS — E78.2 MIXED HYPERLIPIDEMIA: ICD-10-CM

## 2019-03-08 DIAGNOSIS — E11.9 CONTROLLED TYPE 2 DIABETES MELLITUS WITHOUT COMPLICATION, WITHOUT LONG-TERM CURRENT USE OF INSULIN (HCC): ICD-10-CM

## 2019-03-16 ENCOUNTER — DOCUMENTATION (OUTPATIENT)
Dept: INTERNAL MEDICINE | Facility: CLINIC | Age: 64
End: 2019-03-16

## 2019-03-16 DIAGNOSIS — E11.9 CONTROLLED TYPE 2 DIABETES MELLITUS WITHOUT COMPLICATION, WITHOUT LONG-TERM CURRENT USE OF INSULIN (HCC): Primary | ICD-10-CM

## 2019-03-16 NOTE — PROGRESS NOTES
I talked to the patient on his lab results.  Sugar is high because he has been off metformin for a month.  He still on glimepiride 4 mg daily.  He is back on metformin and will see nephrology and would like to test his levels again in about 6 weeks which has advised I have advised.  Other options for glycemic control given him.  We will recheck BMP and A1c in about 6 weeks.

## 2019-04-24 ENCOUNTER — RESULTS ENCOUNTER (OUTPATIENT)
Dept: INTERNAL MEDICINE | Facility: CLINIC | Age: 64
End: 2019-04-24

## 2019-04-24 DIAGNOSIS — E11.9 CONTROLLED TYPE 2 DIABETES MELLITUS WITHOUT COMPLICATION, WITHOUT LONG-TERM CURRENT USE OF INSULIN (HCC): ICD-10-CM

## 2019-06-26 RX ORDER — GLIMEPIRIDE 4 MG/1
TABLET ORAL
Qty: 90 TABLET | Refills: 1 | Status: SHIPPED | OUTPATIENT
Start: 2019-06-26 | End: 2021-03-15 | Stop reason: SDUPTHER

## 2019-10-31 RX ORDER — METFORMIN HYDROCHLORIDE 500 MG/1
1000 TABLET, EXTENDED RELEASE ORAL
Qty: 180 TABLET | Refills: 1 | Status: SHIPPED | OUTPATIENT
Start: 2019-10-31 | End: 2020-01-23 | Stop reason: SDUPTHER

## 2019-12-10 ENCOUNTER — RESULTS ENCOUNTER (OUTPATIENT)
Dept: INTERNAL MEDICINE | Facility: CLINIC | Age: 64
End: 2019-12-10

## 2019-12-10 ENCOUNTER — OFFICE VISIT (OUTPATIENT)
Dept: INTERNAL MEDICINE | Facility: CLINIC | Age: 64
End: 2019-12-10

## 2019-12-10 VITALS
DIASTOLIC BLOOD PRESSURE: 72 MMHG | SYSTOLIC BLOOD PRESSURE: 104 MMHG | HEART RATE: 66 BPM | BODY MASS INDEX: 38.69 KG/M2 | WEIGHT: 262 LBS | OXYGEN SATURATION: 94 % | TEMPERATURE: 96 F

## 2019-12-10 DIAGNOSIS — I50.20 SYSTOLIC CONGESTIVE HEART FAILURE, NYHA CLASS 1, UNSPECIFIED CONGESTIVE HEART FAILURE CHRONICITY (HCC): Primary | ICD-10-CM

## 2019-12-10 DIAGNOSIS — I50.20 SYSTOLIC CONGESTIVE HEART FAILURE, NYHA CLASS 1, UNSPECIFIED CONGESTIVE HEART FAILURE CHRONICITY (HCC): ICD-10-CM

## 2019-12-10 DIAGNOSIS — E11.9 TYPE 2 DIABETES MELLITUS WITHOUT COMPLICATION, WITHOUT LONG-TERM CURRENT USE OF INSULIN (HCC): ICD-10-CM

## 2019-12-10 DIAGNOSIS — E78.2 MIXED HYPERLIPIDEMIA: ICD-10-CM

## 2019-12-10 DIAGNOSIS — N18.30 CHRONIC KIDNEY DISEASE (CKD), STAGE III (MODERATE) (HCC): ICD-10-CM

## 2019-12-10 DIAGNOSIS — I25.10 CORONARY ARTERY DISEASE INVOLVING NATIVE CORONARY ARTERY OF NATIVE HEART WITHOUT ANGINA PECTORIS: ICD-10-CM

## 2019-12-10 DIAGNOSIS — Z23 NEED FOR IMMUNIZATION AGAINST INFLUENZA: ICD-10-CM

## 2019-12-10 PROCEDURE — 99214 OFFICE O/P EST MOD 30 MIN: CPT | Performed by: FAMILY MEDICINE

## 2019-12-10 PROCEDURE — 90674 CCIIV4 VAC NO PRSV 0.5 ML IM: CPT | Performed by: FAMILY MEDICINE

## 2019-12-10 PROCEDURE — 90471 IMMUNIZATION ADMIN: CPT | Performed by: FAMILY MEDICINE

## 2019-12-10 RX ORDER — ASPIRIN 81 MG/1
81 TABLET, CHEWABLE ORAL DAILY
COMMUNITY
Start: 2019-09-30

## 2019-12-13 ENCOUNTER — RESULTS ENCOUNTER (OUTPATIENT)
Dept: INTERNAL MEDICINE | Facility: CLINIC | Age: 64
End: 2019-12-13

## 2019-12-13 DIAGNOSIS — E78.2 MIXED HYPERLIPIDEMIA: ICD-10-CM

## 2019-12-13 DIAGNOSIS — I50.20 SYSTOLIC CONGESTIVE HEART FAILURE, NYHA CLASS 1, UNSPECIFIED CONGESTIVE HEART FAILURE CHRONICITY (HCC): ICD-10-CM

## 2019-12-13 DIAGNOSIS — N18.30 CHRONIC KIDNEY DISEASE (CKD), STAGE III (MODERATE) (HCC): ICD-10-CM

## 2019-12-13 DIAGNOSIS — I25.10 CORONARY ARTERY DISEASE INVOLVING NATIVE CORONARY ARTERY OF NATIVE HEART WITHOUT ANGINA PECTORIS: ICD-10-CM

## 2019-12-13 DIAGNOSIS — E11.9 TYPE 2 DIABETES MELLITUS WITHOUT COMPLICATION, WITHOUT LONG-TERM CURRENT USE OF INSULIN (HCC): ICD-10-CM

## 2020-01-23 RX ORDER — METFORMIN HYDROCHLORIDE 500 MG/1
1000 TABLET, EXTENDED RELEASE ORAL
Qty: 180 TABLET | Refills: 1 | Status: SHIPPED | OUTPATIENT
Start: 2020-01-23 | End: 2020-06-22

## 2020-06-22 RX ORDER — METFORMIN HYDROCHLORIDE 500 MG/1
1000 TABLET, EXTENDED RELEASE ORAL
Qty: 180 TABLET | Refills: 1 | Status: SHIPPED | OUTPATIENT
Start: 2020-06-22 | End: 2021-05-21 | Stop reason: SDUPTHER

## 2021-03-15 RX ORDER — GLIMEPIRIDE 4 MG/1
4 TABLET ORAL
Qty: 90 TABLET | Refills: 1 | Status: SHIPPED | OUTPATIENT
Start: 2021-03-15 | End: 2021-10-18

## 2021-03-15 NOTE — TELEPHONE ENCOUNTER
PATIENT NEEDS REFILL ON:glimepiride (AMARYL) 4 MG tablet     PATIENT CAN BE REACHED ON: [phone]    PHARMACY PREFERRED:[id number] - [place] [address] AT [facility] [place] - [phone]  - [phone]   [phone]

## 2021-04-14 ENCOUNTER — OFFICE VISIT (OUTPATIENT)
Dept: INTERNAL MEDICINE | Facility: CLINIC | Age: 66
End: 2021-04-14

## 2021-04-14 VITALS
WEIGHT: 250 LBS | BODY MASS INDEX: 37.89 KG/M2 | SYSTOLIC BLOOD PRESSURE: 122 MMHG | HEART RATE: 74 BPM | HEIGHT: 68 IN | OXYGEN SATURATION: 96 % | DIASTOLIC BLOOD PRESSURE: 80 MMHG | TEMPERATURE: 97.7 F

## 2021-04-14 DIAGNOSIS — I25.10 CORONARY ARTERY DISEASE INVOLVING NATIVE CORONARY ARTERY OF NATIVE HEART WITHOUT ANGINA PECTORIS: Primary | ICD-10-CM

## 2021-04-14 DIAGNOSIS — E11.9 TYPE 2 DIABETES MELLITUS WITHOUT COMPLICATION, WITHOUT LONG-TERM CURRENT USE OF INSULIN (HCC): ICD-10-CM

## 2021-04-14 DIAGNOSIS — N18.30 STAGE 3 CHRONIC KIDNEY DISEASE, UNSPECIFIED WHETHER STAGE 3A OR 3B CKD (HCC): ICD-10-CM

## 2021-04-14 DIAGNOSIS — I48.20 CHRONIC ATRIAL FIBRILLATION (HCC): ICD-10-CM

## 2021-04-14 DIAGNOSIS — E78.2 MIXED HYPERLIPIDEMIA: ICD-10-CM

## 2021-04-14 PROCEDURE — 99214 OFFICE O/P EST MOD 30 MIN: CPT | Performed by: FAMILY MEDICINE

## 2021-04-14 NOTE — PROGRESS NOTES
"Chief Complaint  Diabetes and Hyperlipidemia    Subjective          Joshua Wolf presents to [facility]  Very pleasant gentleman with history of CAD monitored by Dr. Mc with management of hyperlipidemia with Lipitor 80 mg daily plus now what appears to be chronic atrial fibrillation on Xarelto.  He is functionally doing very well with atrial fibrillation and will see cardiology for consideration is whether or not to try to get back in a sinus rhythm.    Diabetes type 2 managed with diet exercise Metformin extended release 500 mg every 24 hours plus glimepiride 4 mg daily.  Labs will be drawn with A1c and consideration of further treatment.       Objective   Vital Signs:   /80   Pulse 74   Temp 97.7 °F (36.5 °C)   Ht 171.5 cm (67.5\")   Wt 113 kg (250 lb)   SpO2 96%   BMI 38.58 kg/m²     Physical Exam  Vitals reviewed.   Constitutional:       Appearance: He is well-developed.   HENT:      Head: Normocephalic and atraumatic.      Right Ear: Tympanic membrane and external ear normal.      Left Ear: Tympanic membrane and external ear normal.      Nose: Nose normal.   Eyes:      Conjunctiva/sclera: Conjunctivae normal.      Pupils: Pupils are equal, round, and reactive to light.   Neck:      Thyroid: No thyromegaly.      Vascular: No JVD.   Cardiovascular:      Rate and Rhythm: Normal rate. Rhythm regularly irregular.      Heart sounds: Normal heart sounds.   Pulmonary:      Effort: Pulmonary effort is normal.      Breath sounds: Normal breath sounds.   Abdominal:      General: Bowel sounds are normal.      Palpations: Abdomen is soft.   Musculoskeletal:         General: Normal range of motion.      Cervical back: Normal range of motion and neck supple.   Lymphadenopathy:      Cervical: No cervical adenopathy.   Skin:     General: Skin is warm and dry.      Findings: No rash.   Neurological:      Mental Status: He is alert and oriented to person, place, and time.      " Cranial Nerves: No cranial nerve deficit.      Coordination: Coordination normal.   Psychiatric:         Behavior: Behavior normal.         Thought Content: Thought content normal.         Judgment: Judgment normal.        Result Review :                 Assessment and Plan    Diagnoses and all orders for this visit:    1. Coronary artery disease involving native coronary artery of native heart without angina pectoris (Primary)  Comments:  Labs pending including lipid panel.  Follow-up with cardiology.  Orders:  -     Comprehensive Metabolic Panel  -     CBC & Differential  -     Lipid Panel With / Chol / HDL Ratio  -     Hemoglobin A1c  -     Urinalysis With Microscopic If Indicated (No Culture) - Urine, Clean Catch  -     Cancel: MicroAlbumin, Urine, Random - Urine, Clean Catch  -     Phosphorus  -     Vitamin B12  -     TSH  -     T4, Free  -     T3, Free  -     MicroAlbumin, Urine, Random - Urine, Clean Catch    2. Mixed hyperlipidemia  Comments:  Continue with Lipitor 80 mg daily  Orders:  -     Comprehensive Metabolic Panel  -     CBC & Differential  -     Lipid Panel With / Chol / HDL Ratio  -     Hemoglobin A1c  -     Urinalysis With Microscopic If Indicated (No Culture) - Urine, Clean Catch  -     Cancel: MicroAlbumin, Urine, Random - Urine, Clean Catch  -     Phosphorus  -     Vitamin B12  -     TSH  -     T4, Free  -     T3, Free  -     MicroAlbumin, Urine, Random - Urine, Clean Catch    3. Type 2 diabetes mellitus without complication, without long-term current use of insulin (CMS/[facility])  Comments:  Current antidiabetic drugs including glimepiride and Metformin are reviewed pending labs.  Orders:  -     Comprehensive Metabolic Panel  -     CBC & Differential  -     Lipid Panel With / Chol / HDL Ratio  -     Hemoglobin A1c  -     Urinalysis With Microscopic If Indicated (No Culture) - Urine, Clean Catch  -     Cancel: MicroAlbumin, Urine, Random - Urine, Clean Catch  -     Phosphorus  -     Vitamin B12  -      TSH  -     T4, Free  -     T3, Free  -     MicroAlbumin, Urine, Random - Urine, Clean Catch    4. Stage 3 chronic kidney disease, unspecified whether stage 3a or 3b CKD (CMS/[facility])  Comments:  Follow-up with nephrology Dr. Hughes  Orders:  -     Comprehensive Metabolic Panel  -     CBC & Differential  -     Lipid Panel With / Chol / HDL Ratio  -     Hemoglobin A1c  -     Urinalysis With Microscopic If Indicated (No Culture) - Urine, Clean Catch  -     Cancel: MicroAlbumin, Urine, Random - Urine, Clean Catch  -     Phosphorus  -     Vitamin B12  -     TSH  -     T4, Free  -     T3, Free  -     MicroAlbumin, Urine, Random - Urine, Clean Catch    5. Chronic atrial fibrillation (CMS/[facility])  Comments:  Continue Xarelto  Orders:  -     Comprehensive Metabolic Panel  -     CBC & Differential  -     Lipid Panel With / Chol / HDL Ratio  -     Hemoglobin A1c  -     Urinalysis With Microscopic If Indicated (No Culture) - Urine, Clean Catch  -     Cancel: MicroAlbumin, Urine, Random - Urine, Clean Catch  -     Phosphorus  -     Vitamin B12  -     TSH  -     T4, Free  -     T3, Free  -     MicroAlbumin, Urine, Random - Urine, Clean Catch        Follow Up   Return in about 6 months (around 10/14/2021) for Recheck.  Patient was given instructions and counseling regarding his condition or for health maintenance advice. Please see specific information pulled into the AVS if appropriate.

## 2021-04-15 LAB
ALBUMIN SERPL-MCNC: 4.5 G/DL (ref 3.5–5.2)
ALBUMIN/GLOB SERPL: 1.9 G/DL
ALP SERPL-CCNC: 60 U/L (ref 39–117)
ALT SERPL-CCNC: 21 U/L (ref 1–41)
APPEARANCE UR: CLEAR
AST SERPL-CCNC: 15 U/L (ref 1–40)
BASOPHILS # BLD AUTO: 0.06 10*3/MM3 (ref 0–0.2)
BASOPHILS NFR BLD AUTO: 0.9 % (ref 0–1.5)
BILIRUB SERPL-MCNC: 1 MG/DL (ref 0–1.2)
BILIRUB UR QL STRIP: NEGATIVE
BUN SERPL-MCNC: 42 MG/DL (ref 8–23)
BUN/CREAT SERPL: 24.6 (ref 7–25)
CALCIUM SERPL-MCNC: 9.9 MG/DL (ref 8.6–10.5)
CHLORIDE SERPL-SCNC: 105 MMOL/L (ref 98–107)
CHOLEST SERPL-MCNC: 197 MG/DL (ref 0–200)
CHOLEST/HDLC SERPL: 5.05 {RATIO}
CO2 SERPL-SCNC: 24.7 MMOL/L (ref 22–29)
COLOR UR: YELLOW
CREAT SERPL-MCNC: 1.71 MG/DL (ref 0.76–1.27)
EOSINOPHIL # BLD AUTO: 0.22 10*3/MM3 (ref 0–0.4)
EOSINOPHIL NFR BLD AUTO: 3.2 % (ref 0.3–6.2)
ERYTHROCYTE [DISTWIDTH] IN BLOOD BY AUTOMATED COUNT: 12.6 % (ref 12.3–15.4)
GLOBULIN SER CALC-MCNC: 2.4 GM/DL
GLUCOSE SERPL-MCNC: 179 MG/DL (ref 65–99)
GLUCOSE UR QL: NEGATIVE
HBA1C MFR BLD: 5.6 % (ref 4.8–5.6)
HCT VFR BLD AUTO: 46.8 % (ref 37.5–51)
HDLC SERPL-MCNC: 39 MG/DL (ref 40–60)
HGB BLD-MCNC: 15.6 G/DL (ref 13–17.7)
HGB UR QL STRIP: NEGATIVE
IMM GRANULOCYTES # BLD AUTO: 0.01 10*3/MM3 (ref 0–0.05)
IMM GRANULOCYTES NFR BLD AUTO: 0.1 % (ref 0–0.5)
KETONES UR QL STRIP: NEGATIVE
LDLC SERPL CALC-MCNC: 136 MG/DL (ref 0–100)
LEUKOCYTE ESTERASE UR QL STRIP: NEGATIVE
LYMPHOCYTES # BLD AUTO: 1.18 10*3/MM3 (ref 0.7–3.1)
LYMPHOCYTES NFR BLD AUTO: 17.2 % (ref 19.6–45.3)
MCH RBC QN AUTO: 32.4 PG (ref 26.6–33)
MCHC RBC AUTO-ENTMCNC: 33.3 G/DL (ref 31.5–35.7)
MCV RBC AUTO: 97.1 FL (ref 79–97)
MICROALBUMIN UR-MCNC: <3 UG/ML
MONOCYTES # BLD AUTO: 0.6 10*3/MM3 (ref 0.1–0.9)
MONOCYTES NFR BLD AUTO: 8.8 % (ref 5–12)
NEUTROPHILS # BLD AUTO: 4.78 10*3/MM3 (ref 1.7–7)
NEUTROPHILS NFR BLD AUTO: 69.8 % (ref 42.7–76)
NITRITE UR QL STRIP: NEGATIVE
NRBC BLD AUTO-RTO: 0 /100 WBC (ref 0–0.2)
PH UR STRIP: 6 [PH] (ref 5–8)
PHOSPHATE SERPL-MCNC: 3.4 MG/DL (ref 2.5–4.5)
PLATELET # BLD AUTO: 180 10*3/MM3 (ref 140–450)
POTASSIUM SERPL-SCNC: 5.2 MMOL/L (ref 3.5–5.2)
PROT SERPL-MCNC: 6.9 G/DL (ref 6–8.5)
PROT UR QL STRIP: NEGATIVE
RBC # BLD AUTO: 4.82 10*6/MM3 (ref 4.14–5.8)
SODIUM SERPL-SCNC: 140 MMOL/L (ref 136–145)
SP GR UR: 1.01 (ref 1–1.03)
T3FREE SERPL-MCNC: 2.8 PG/ML (ref 2–4.4)
T4 FREE SERPL-MCNC: 1.8 NG/DL (ref 0.93–1.7)
TRIGL SERPL-MCNC: 122 MG/DL (ref 0–150)
TSH SERPL DL<=0.005 MIU/L-ACNC: 3.96 UIU/ML (ref 0.27–4.2)
UROBILINOGEN UR STRIP-MCNC: NORMAL MG/DL
VIT B12 SERPL-MCNC: 456 PG/ML (ref 211–946)
VLDLC SERPL CALC-MCNC: 22 MG/DL (ref 5–40)
WBC # BLD AUTO: 6.85 10*3/MM3 (ref 3.4–10.8)

## 2021-05-21 RX ORDER — METFORMIN HYDROCHLORIDE 500 MG/1
1000 TABLET, EXTENDED RELEASE ORAL
Qty: 180 TABLET | Refills: 1 | Status: SHIPPED | OUTPATIENT
Start: 2021-05-21 | End: 2021-11-17

## 2021-05-21 NOTE — TELEPHONE ENCOUNTER
PATIENT CALLED FOR MEDICATION REFILL OF   metFORMIN ER (GLUCOPHAGE-XR) 500 MG 24 hr tablet    HE IS OUT OF MEDICATION      [id number] - [address] AT [facility] - [phone]  - [phone]   [phone]    CALL BACK NUMBER [phone]    THIS WILL BE HIS NEW PHARMACY FOR ALL MEDICATIONS

## 2021-10-14 ENCOUNTER — OFFICE VISIT (OUTPATIENT)
Dept: INTERNAL MEDICINE | Facility: CLINIC | Age: 66
End: 2021-10-14

## 2021-10-14 VITALS
HEART RATE: 50 BPM | TEMPERATURE: 97.1 F | SYSTOLIC BLOOD PRESSURE: 102 MMHG | BODY MASS INDEX: 37.96 KG/M2 | DIASTOLIC BLOOD PRESSURE: 70 MMHG | OXYGEN SATURATION: 96 % | WEIGHT: 246 LBS

## 2021-10-14 DIAGNOSIS — E78.2 MIXED HYPERLIPIDEMIA: ICD-10-CM

## 2021-10-14 DIAGNOSIS — I25.10 CORONARY ARTERY DISEASE INVOLVING NATIVE CORONARY ARTERY OF NATIVE HEART WITHOUT ANGINA PECTORIS: Primary | ICD-10-CM

## 2021-10-14 DIAGNOSIS — E11.9 TYPE 2 DIABETES MELLITUS WITHOUT COMPLICATION, WITHOUT LONG-TERM CURRENT USE OF INSULIN (HCC): ICD-10-CM

## 2021-10-14 PROCEDURE — 99213 OFFICE O/P EST LOW 20 MIN: CPT | Performed by: FAMILY MEDICINE

## 2021-10-14 RX ORDER — ERGOCALCIFEROL 1.25 MG/1
50000 CAPSULE ORAL
COMMUNITY
Start: 2021-07-13

## 2021-10-14 NOTE — PROGRESS NOTES
Chief Complaint  Hyperlipidemia and Diabetes    Subjective          Joshua Wolf presents to [facility]  Very pleasant gentleman with history of CAD CHF which has been improved chronic A. fib on Xarelto 20 mg daily.  Otherwise 2 diabetes treated with oral medications with evidence of grade 3 chronic kidney disease which is been stable.  He is having a follow-up with Dr. Lucia nephrology in the next few weeks.  Labs to be drawn at that time.    He has done better with A1c this year is reviewed with him.  Options further would include addition of a replacement of his diabetes medicine.  Slightly guarded Jardiance but he is doing well on his current regimen at this time.  He has been doing overall a good job of diabetic management.  We will get a flu shot today.  We will also get a Covid booster at some juncture.      Objective   Vital Signs:   /70 (BP Location: Left arm, Patient Position: Sitting, Cuff Size: Adult)   Pulse 50   Temp 97.1 °F (36.2 °C) (Tympanic)   Wt 112 kg (246 lb)   SpO2 96%   BMI 37.96 kg/m²     Physical Exam  Vitals reviewed.   Constitutional:       Appearance: He is well-developed.   HENT:      Head: Normocephalic and atraumatic.      Right Ear: Tympanic membrane and external ear normal.      Left Ear: Tympanic membrane and external ear normal.   Eyes:      Conjunctiva/sclera: Conjunctivae normal.      Pupils: Pupils are equal, round, and reactive to light.   Neck:      Thyroid: No thyromegaly.      Vascular: No JVD.   Cardiovascular:      Rate and Rhythm: Normal rate. Rhythm irregularly irregular.      Heart sounds: Normal heart sounds.   Pulmonary:      Effort: Pulmonary effort is normal.      Breath sounds: Normal breath sounds.   Abdominal:      General: Bowel sounds are normal.      Palpations: Abdomen is soft.   Musculoskeletal:         General: Normal range of motion.      Cervical back: Normal range of motion and neck supple.   Lymphadenopathy:       Cervical: No cervical adenopathy.   Skin:     General: Skin is warm and dry.      Findings: No rash.   Neurological:      Mental Status: He is alert and oriented to person, place, and time.      Cranial Nerves: No cranial nerve deficit.      Coordination: Coordination normal.   Psychiatric:         Behavior: Behavior normal.         Thought Content: Thought content normal.         Judgment: Judgment normal.        Result Review :   The following data was reviewed by: Eric Eubanks Jr., MD on 10/14/2021:  Common labs    Common Labsle 4/14/21 4/14/21 4/14/21 4/14/21 4/14/21    1030 1030 1030 1030 1030   Glucose 179 (A)       BUN 42 (A)       Creatinine 1.71 (A)       eGFR Non  Am 41 (A)       eGFR  Am 49 (A)       Sodium 140       Potassium 5.2       Chloride 105       Calcium 9.9       Total Protein 6.9       Albumin 4.50       Total Bilirubin 1.0       Alkaline Phosphatase 60       AST (SGOT) 15       ALT (SGPT) 21       WBC  6.85      Hemoglobin  15.6      Hematocrit  46.8      Platelets  180      Total Cholesterol   197     Triglycerides   122     HDL Cholesterol   39 (A)     LDL Cholesterol    136 (A)     Hemoglobin A1C    5.60    Microalbumin, Urine     <3.0   (A) Abnormal value       Comments are available for some flowsheets but are not being displayed.           Data reviewed: Consultant notes Nephrology          Assessment and Plan    Diagnoses and all orders for this visit:    1. Coronary artery disease involving native coronary artery of native heart without angina pectoris (Primary)    2. Mixed hyperlipidemia    3. Type 2 diabetes mellitus without complication, without long-term current use of insulin (HCC)        Follow Up   Return in about 6 months (around 4/14/2022).  Patient was given instructions and counseling regarding his condition or for health maintenance advice. Please see specific information pulled into the AVS if appropriate.

## 2021-10-18 RX ORDER — GLIMEPIRIDE 4 MG/1
4 TABLET ORAL
Qty: 90 TABLET | Refills: 1 | Status: SHIPPED | OUTPATIENT
Start: 2021-10-18 | End: 2022-04-12 | Stop reason: SDUPTHER

## 2021-11-17 RX ORDER — METFORMIN HYDROCHLORIDE 500 MG/1
1000 TABLET, EXTENDED RELEASE ORAL
Qty: 180 TABLET | Refills: 1 | Status: SHIPPED | OUTPATIENT
Start: 2021-11-17 | End: 2022-04-12 | Stop reason: SDUPTHER

## 2021-12-29 ENCOUNTER — IMMUNIZATION (OUTPATIENT)
Dept: VACCINE CLINIC | Facility: HOSPITAL | Age: 66
End: 2021-12-29

## 2021-12-29 PROCEDURE — 91300 HC SARSCOV02 VAC 30MCG/0.3ML IM: CPT | Performed by: INTERNAL MEDICINE

## 2021-12-29 PROCEDURE — 0003A: CPT | Performed by: INTERNAL MEDICINE

## 2021-12-29 PROCEDURE — 0004A HC ADM SARSCOV2 30MCG/0.3ML BOOSTER: CPT | Performed by: INTERNAL MEDICINE

## 2022-04-12 ENCOUNTER — OFFICE VISIT (OUTPATIENT)
Dept: INTERNAL MEDICINE | Facility: CLINIC | Age: 67
End: 2022-04-12

## 2022-04-12 VITALS
TEMPERATURE: 96.6 F | BODY MASS INDEX: 39.53 KG/M2 | WEIGHT: 260.8 LBS | HEIGHT: 68 IN | SYSTOLIC BLOOD PRESSURE: 100 MMHG | OXYGEN SATURATION: 94 % | HEART RATE: 72 BPM | DIASTOLIC BLOOD PRESSURE: 69 MMHG

## 2022-04-12 DIAGNOSIS — Z12.11 COLON CANCER SCREENING: Primary | ICD-10-CM

## 2022-04-12 DIAGNOSIS — E78.2 MIXED HYPERLIPIDEMIA: ICD-10-CM

## 2022-04-12 DIAGNOSIS — I25.10 CORONARY ARTERY DISEASE INVOLVING NATIVE CORONARY ARTERY OF NATIVE HEART WITHOUT ANGINA PECTORIS: ICD-10-CM

## 2022-04-12 DIAGNOSIS — N18.31 STAGE 3A CHRONIC KIDNEY DISEASE: ICD-10-CM

## 2022-04-12 DIAGNOSIS — E11.9 TYPE 2 DIABETES MELLITUS WITHOUT COMPLICATION, WITHOUT LONG-TERM CURRENT USE OF INSULIN: ICD-10-CM

## 2022-04-12 DIAGNOSIS — Z12.5 PROSTATE CANCER SCREENING: ICD-10-CM

## 2022-04-12 PROCEDURE — 99214 OFFICE O/P EST MOD 30 MIN: CPT | Performed by: FAMILY MEDICINE

## 2022-04-12 RX ORDER — ATORVASTATIN CALCIUM 80 MG/1
80 TABLET, FILM COATED ORAL DAILY
Qty: 30 TABLET | Refills: 11 | Status: SHIPPED | OUTPATIENT
Start: 2022-04-12 | End: 2023-01-18 | Stop reason: SDUPTHER

## 2022-04-12 RX ORDER — GLIMEPIRIDE 4 MG/1
4 TABLET ORAL
Qty: 90 TABLET | Refills: 1 | Status: SHIPPED | OUTPATIENT
Start: 2022-04-12 | End: 2023-02-24 | Stop reason: SDUPTHER

## 2022-04-12 RX ORDER — METFORMIN HYDROCHLORIDE 500 MG/1
1000 TABLET, EXTENDED RELEASE ORAL
Qty: 180 TABLET | Refills: 1 | Status: SHIPPED | OUTPATIENT
Start: 2022-04-12

## 2022-04-12 NOTE — PROGRESS NOTES
"Chief Complaint  Hyperlipidemia (6 month follow up)    Subjective          Joshua Wolf presents to [facility]  Pleasant interesting gentleman followed by cardiology for some degree of ischemic cardiomyopathy managed medically with CAD PAF on chronic Xarelto.  He is doing well in his medications.  He has follow-up with nephrology for chronic kidney disease which is been stable as well.  Otherwise premonitoring type 2 diabetes currently on glimepiride plus Metformin.  He does workout frequently.  We will get labs today including a PSA and he is counseled on the implications of PSA screening.  He has no family members history of colon cancer and agrees to Cologuard screening as opposed to colonoscopy with counseling on pros and cons of either.      Objective   Vital Signs:   /69 (BP Location: Right arm, Patient Position: Sitting, Cuff Size: Adult)   Pulse 72   Temp 96.6 °F (35.9 °C) (Temporal)   Ht 171.5 cm (67.52\")   Wt 118 kg (260 lb 12.8 oz)   SpO2 94%   BMI 40.22 kg/m²            Physical Exam  Vitals reviewed.   Constitutional:       Appearance: He is well-developed. He is obese.   HENT:      Head: Normocephalic and atraumatic.      Right Ear: Tympanic membrane and external ear normal.      Left Ear: Tympanic membrane and external ear normal.      Nose: Nose normal.   Eyes:      Conjunctiva/sclera: Conjunctivae normal.      Pupils: Pupils are equal, round, and reactive to light.   Neck:      Thyroid: No thyromegaly.      Vascular: No JVD.   Cardiovascular:      Rate and Rhythm: Normal rate and regular rhythm.      Heart sounds: Normal heart sounds.   Pulmonary:      Effort: Pulmonary effort is normal.      Breath sounds: Normal breath sounds.   Abdominal:      General: Bowel sounds are normal.      Palpations: Abdomen is soft.   Musculoskeletal:         General: Normal range of motion.      Cervical back: Normal range of motion and neck supple.   Lymphadenopathy:      " Cervical: No cervical adenopathy.   Skin:     General: Skin is warm and dry.      Findings: No rash.   Neurological:      Mental Status: He is alert and oriented to person, place, and time.      Cranial Nerves: No cranial nerve deficit.      Coordination: Coordination normal.   Psychiatric:         Behavior: Behavior normal.         Thought Content: Thought content normal.         Judgment: Judgment normal.        Result Review :   The following data was reviewed by: Eric Eubanks MD on 04/12/2022:  Common labs    Common Labsle 4/12/22 4/12/22 4/12/22 4/12/22 4/12/22    1016 1016 1016 1016 1016   Glucose  325 (A)      BUN  29 (A)      Creatinine  1.37 (A)      Sodium  137      Potassium  4.8      Chloride  99      Calcium  9.4      Total Protein  6.8      Albumin  4.4      Total Bilirubin  1.9 (A)      Alkaline Phosphatase  86      AST (SGOT)  17      ALT (SGPT)  25      WBC 7.0       Hemoglobin 14.8       Hematocrit 43.0       Platelets 164       Total Cholesterol   128     Triglycerides   79     HDL Cholesterol   44     LDL Cholesterol    68     Hemoglobin A1C    9.3 (A)    PSA     0.2   (A) Abnormal value       Comments are available for some flowsheets but are not being displayed.                     Assessment and Plan    Diagnoses and all orders for this visit:    1. Colon cancer screening (Primary)  -     Cologuard - Stool, Per Rectum; Future    2. Coronary artery disease involving native coronary artery of native heart without angina pectoris  Comments:  Continue medical management per cardiology.  Continue Xarelto.  Orders:  -     CBC & Differential  -     Comprehensive Metabolic Panel  -     Lipid Panel With / Chol / HDL Ratio  -     Hemoglobin A1c  -     Urinalysis With Microscopic If Indicated (No Culture) - Urine, Clean Catch  -     Cancel: MicroAlbumin, Urine, Random - Urine, Clean Catch  -     PSA SCREENING    3. Mixed hyperlipidemia  Comments:  Atorvastatin 80 mg daily  Orders:  -     CBC &  Differential  -     Comprehensive Metabolic Panel  -     Lipid Panel With / Chol / HDL Ratio  -     Hemoglobin A1c  -     Urinalysis With Microscopic If Indicated (No Culture) - Urine, Clean Catch  -     Cancel: MicroAlbumin, Urine, Random - Urine, Clean Catch  -     PSA SCREENING    4. Type 2 diabetes mellitus without complication, without long-term current use of insulin (HCC)  Comments:  Glimepiride 4 mg daily Metformin extended release 500 mg daily  Orders:  -     CBC & Differential  -     Comprehensive Metabolic Panel  -     Lipid Panel With / Chol / HDL Ratio  -     Hemoglobin A1c  -     Urinalysis With Microscopic If Indicated (No Culture) - Urine, Clean Catch  -     Cancel: MicroAlbumin, Urine, Random - Urine, Clean Catch  -     PSA SCREENING  -     MicroAlbumin, Urine, Random - Urine, Clean Catch    5. Stage 3a chronic kidney disease (HCC)  Comments:  Follow-up with nephrology  Orders:  -     CBC & Differential  -     Comprehensive Metabolic Panel  -     Lipid Panel With / Chol / HDL Ratio  -     Hemoglobin A1c  -     Urinalysis With Microscopic If Indicated (No Culture) - Urine, Clean Catch  -     Cancel: MicroAlbumin, Urine, Random - Urine, Clean Catch  -     PSA SCREENING  -     MicroAlbumin, Urine, Random - Urine, Clean Catch    6. Prostate cancer screening  Comments:  PSA pending  Orders:  -     PSA SCREENING    Other orders  -     atorvastatin (LIPITOR) 80 MG tablet; Take 1 tablet by mouth Daily.  Dispense: 30 tablet; Refill: 11  -     metFORMIN ER (GLUCOPHAGE-XR) 500 MG 24 hr tablet; Take 2 tablets by mouth Daily With Breakfast.  Dispense: 180 tablet; Refill: 1  -     glimepiride (AMARYL) 4 MG tablet; Take 1 tablet by mouth Every Morning Before Breakfast.  Dispense: 90 tablet; Refill: 1        Follow Up   Return in about 6 months (around 10/12/2022) for Recheck.  Patient was given instructions and counseling regarding his condition or for health maintenance advice. Please see specific information  pulled into the AVS if appropriate.

## 2022-04-13 LAB
ALBUMIN SERPL-MCNC: 4.4 G/DL (ref 3.8–4.8)
ALBUMIN/GLOB SERPL: 1.8 {RATIO} (ref 1.2–2.2)
ALP SERPL-CCNC: 86 IU/L (ref 44–121)
ALT SERPL-CCNC: 25 IU/L (ref 0–44)
APPEARANCE UR: CLEAR
AST SERPL-CCNC: 17 IU/L (ref 0–40)
BASOPHILS # BLD AUTO: 0.1 X10E3/UL (ref 0–0.2)
BASOPHILS NFR BLD AUTO: 1 %
BILIRUB SERPL-MCNC: 1.9 MG/DL (ref 0–1.2)
BILIRUB UR QL STRIP: NEGATIVE
BUN SERPL-MCNC: 29 MG/DL (ref 8–27)
BUN/CREAT SERPL: 21 (ref 10–24)
CALCIUM SERPL-MCNC: 9.4 MG/DL (ref 8.6–10.2)
CHLORIDE SERPL-SCNC: 99 MMOL/L (ref 96–106)
CHOLEST SERPL-MCNC: 128 MG/DL (ref 100–199)
CHOLEST/HDLC SERPL: 2.9 RATIO (ref 0–5)
CO2 SERPL-SCNC: 23 MMOL/L (ref 20–29)
COLOR UR: YELLOW
CREAT SERPL-MCNC: 1.37 MG/DL (ref 0.76–1.27)
EGFRCR SERPLBLD CKD-EPI 2021: 58 ML/MIN/1.73
EOSINOPHIL # BLD AUTO: 0.2 X10E3/UL (ref 0–0.4)
EOSINOPHIL NFR BLD AUTO: 3 %
ERYTHROCYTE [DISTWIDTH] IN BLOOD BY AUTOMATED COUNT: 12.5 % (ref 11.6–15.4)
GLOBULIN SER CALC-MCNC: 2.4 G/DL (ref 1.5–4.5)
GLUCOSE SERPL-MCNC: 325 MG/DL (ref 65–99)
GLUCOSE UR QL STRIP: ABNORMAL
HBA1C MFR BLD: 9.3 % (ref 4.8–5.6)
HCT VFR BLD AUTO: 43 % (ref 37.5–51)
HDLC SERPL-MCNC: 44 MG/DL
HGB BLD-MCNC: 14.8 G/DL (ref 13–17.7)
HGB UR QL STRIP: NEGATIVE
IMM GRANULOCYTES # BLD AUTO: 0 X10E3/UL (ref 0–0.1)
IMM GRANULOCYTES NFR BLD AUTO: 1 %
KETONES UR QL STRIP: NEGATIVE
LDLC SERPL CALC-MCNC: 68 MG/DL (ref 0–99)
LEUKOCYTE ESTERASE UR QL STRIP: NEGATIVE
LYMPHOCYTES # BLD AUTO: 1.4 X10E3/UL (ref 0.7–3.1)
LYMPHOCYTES NFR BLD AUTO: 20 %
MCH RBC QN AUTO: 31.6 PG (ref 26.6–33)
MCHC RBC AUTO-ENTMCNC: 34.4 G/DL (ref 31.5–35.7)
MCV RBC AUTO: 92 FL (ref 79–97)
MICRO URNS: ABNORMAL
MONOCYTES # BLD AUTO: 0.6 X10E3/UL (ref 0.1–0.9)
MONOCYTES NFR BLD AUTO: 9 %
NEUTROPHILS # BLD AUTO: 4.7 X10E3/UL (ref 1.4–7)
NEUTROPHILS NFR BLD AUTO: 66 %
NITRITE UR QL STRIP: NEGATIVE
PH UR STRIP: 6 [PH] (ref 5–7.5)
PLATELET # BLD AUTO: 164 X10E3/UL (ref 150–450)
POTASSIUM SERPL-SCNC: 4.8 MMOL/L (ref 3.5–5.2)
PROT SERPL-MCNC: 6.8 G/DL (ref 6–8.5)
PROT UR QL STRIP: NEGATIVE
PSA SERPL-MCNC: 0.2 NG/ML (ref 0–4)
RBC # BLD AUTO: 4.69 X10E6/UL (ref 4.14–5.8)
SODIUM SERPL-SCNC: 137 MMOL/L (ref 134–144)
SP GR UR STRIP: 1.01 (ref 1–1.03)
TRIGL SERPL-MCNC: 79 MG/DL (ref 0–149)
UROBILINOGEN UR STRIP-MCNC: 0.2 MG/DL (ref 0.2–1)
VLDLC SERPL CALC-MCNC: 16 MG/DL (ref 5–40)
WBC # BLD AUTO: 7 X10E3/UL (ref 3.4–10.8)

## 2023-01-01 ENCOUNTER — TELEPHONE (OUTPATIENT)
Dept: INTERNAL MEDICINE | Facility: CLINIC | Age: 68
End: 2023-01-01

## 2023-01-05 ENCOUNTER — APPOINTMENT (OUTPATIENT)
Dept: GENERAL RADIOLOGY | Facility: HOSPITAL | Age: 68
DRG: 234 | End: 2023-01-05
Payer: MEDICARE

## 2023-01-05 ENCOUNTER — HOSPITAL ENCOUNTER (INPATIENT)
Facility: HOSPITAL | Age: 68
LOS: 9 days | Discharge: HOME HEALTH | DRG: 234 | End: 2023-01-16
Attending: EMERGENCY MEDICINE | Admitting: INTERNAL MEDICINE
Payer: MEDICARE

## 2023-01-05 DIAGNOSIS — R93.1 ABNORMAL FINDINGS ON DIAGNOSTIC IMAGING OF HEART AND CORONARY CIRCULATION: ICD-10-CM

## 2023-01-05 DIAGNOSIS — R07.9 CHEST PAIN, UNSPECIFIED TYPE: ICD-10-CM

## 2023-01-05 DIAGNOSIS — Z95.1 S/P CABG (CORONARY ARTERY BYPASS GRAFT): ICD-10-CM

## 2023-01-05 DIAGNOSIS — I21.4 NSTEMI (NON-ST ELEVATED MYOCARDIAL INFARCTION): Primary | ICD-10-CM

## 2023-01-05 DIAGNOSIS — I25.9 CHEST PAIN DUE TO MYOCARDIAL ISCHEMIA, UNSPECIFIED ISCHEMIC CHEST PAIN TYPE: ICD-10-CM

## 2023-01-05 LAB
ALBUMIN SERPL-MCNC: 4 G/DL (ref 3.5–5.2)
ALBUMIN/GLOB SERPL: 1.6 G/DL
ALP SERPL-CCNC: 100 U/L (ref 39–117)
ALT SERPL W P-5'-P-CCNC: 20 U/L (ref 1–41)
ANION GAP SERPL CALCULATED.3IONS-SCNC: 8.1 MMOL/L (ref 5–15)
APTT PPP: 25 SECONDS (ref 22.7–35.4)
AST SERPL-CCNC: 12 U/L (ref 1–40)
BILIRUB SERPL-MCNC: 0.8 MG/DL (ref 0–1.2)
BUN SERPL-MCNC: 22 MG/DL (ref 8–23)
BUN/CREAT SERPL: 19.8 (ref 7–25)
CALCIUM SPEC-SCNC: 9 MG/DL (ref 8.6–10.5)
CHLORIDE SERPL-SCNC: 101 MMOL/L (ref 98–107)
CO2 SERPL-SCNC: 23.9 MMOL/L (ref 22–29)
CREAT SERPL-MCNC: 1.11 MG/DL (ref 0.76–1.27)
DEPRECATED RDW RBC AUTO: 40.1 FL (ref 37–54)
EGFRCR SERPLBLD CKD-EPI 2021: 73.7 ML/MIN/1.73
EOSINOPHIL # BLD MANUAL: 0.06 10*3/MM3 (ref 0–0.4)
EOSINOPHIL NFR BLD MANUAL: 1 % (ref 0.3–6.2)
ERYTHROCYTE [DISTWIDTH] IN BLOOD BY AUTOMATED COUNT: 12.1 % (ref 12.3–15.4)
GLOBULIN UR ELPH-MCNC: 2.5 GM/DL
GLUCOSE BLDC GLUCOMTR-MCNC: 303 MG/DL (ref 70–130)
GLUCOSE BLDC GLUCOMTR-MCNC: 352 MG/DL (ref 70–130)
GLUCOSE SERPL-MCNC: 391 MG/DL (ref 65–99)
HCT VFR BLD AUTO: 47 % (ref 37.5–51)
HGB BLD-MCNC: 15.7 G/DL (ref 13–17.7)
HOLD SPECIMEN: NORMAL
HOLD SPECIMEN: NORMAL
INR PPP: 1.04 (ref 0.9–1.1)
LYMPHOCYTES # BLD MANUAL: 1.21 10*3/MM3 (ref 0.7–3.1)
LYMPHOCYTES NFR BLD MANUAL: 8 % (ref 5–12)
MCH RBC QN AUTO: 30.1 PG (ref 26.6–33)
MCHC RBC AUTO-ENTMCNC: 33.4 G/DL (ref 31.5–35.7)
MCV RBC AUTO: 90 FL (ref 79–97)
MONOCYTES # BLD: 0.48 10*3/MM3 (ref 0.1–0.9)
NEUTROPHILS # BLD AUTO: 4.3 10*3/MM3 (ref 1.7–7)
NEUTROPHILS NFR BLD MANUAL: 71 % (ref 42.7–76)
PLAT MORPH BLD: NORMAL
PLATELET # BLD AUTO: 142 10*3/MM3 (ref 140–450)
PMV BLD AUTO: 10.6 FL (ref 6–12)
POTASSIUM SERPL-SCNC: 4.2 MMOL/L (ref 3.5–5.2)
PROT SERPL-MCNC: 6.5 G/DL (ref 6–8.5)
PROTHROMBIN TIME: 13.7 SECONDS (ref 11.7–14.2)
QT INTERVAL: 375 MS
QT INTERVAL: 380 MS
RBC # BLD AUTO: 5.22 10*6/MM3 (ref 4.14–5.8)
RBC MORPH BLD: NORMAL
SODIUM SERPL-SCNC: 133 MMOL/L (ref 136–145)
TROPONIN T SERPL-MCNC: 0.14 NG/ML (ref 0–0.03)
TROPONIN T SERPL-MCNC: 0.18 NG/ML (ref 0–0.03)
TROPONIN T SERPL-MCNC: <0.01 NG/ML (ref 0–0.03)
VARIANT LYMPHS NFR BLD MANUAL: 18 % (ref 19.6–45.3)
VARIANT LYMPHS NFR BLD MANUAL: 2 % (ref 0–5)
WBC MORPH BLD: NORMAL
WBC NRBC COR # BLD: 6.06 10*3/MM3 (ref 3.4–10.8)
WHOLE BLOOD HOLD COAG: NORMAL
WHOLE BLOOD HOLD SPECIMEN: NORMAL

## 2023-01-05 PROCEDURE — 63710000001 INSULIN LISPRO (HUMAN) PER 5 UNITS: Performed by: PHYSICIAN ASSISTANT

## 2023-01-05 PROCEDURE — 80053 COMPREHEN METABOLIC PANEL: CPT | Performed by: EMERGENCY MEDICINE

## 2023-01-05 PROCEDURE — 25010000002 HEPARIN (PORCINE) 25000-0.45 UT/250ML-% SOLUTION: Performed by: PHYSICIAN ASSISTANT

## 2023-01-05 PROCEDURE — 99285 EMERGENCY DEPT VISIT HI MDM: CPT

## 2023-01-05 PROCEDURE — 85730 THROMBOPLASTIN TIME PARTIAL: CPT | Performed by: PHYSICIAN ASSISTANT

## 2023-01-05 PROCEDURE — 93010 ELECTROCARDIOGRAM REPORT: CPT | Performed by: INTERNAL MEDICINE

## 2023-01-05 PROCEDURE — G0378 HOSPITAL OBSERVATION PER HR: HCPCS

## 2023-01-05 PROCEDURE — 85007 BL SMEAR W/DIFF WBC COUNT: CPT | Performed by: EMERGENCY MEDICINE

## 2023-01-05 PROCEDURE — 82962 GLUCOSE BLOOD TEST: CPT

## 2023-01-05 PROCEDURE — 93005 ELECTROCARDIOGRAM TRACING: CPT | Performed by: PHYSICIAN ASSISTANT

## 2023-01-05 PROCEDURE — 93005 ELECTROCARDIOGRAM TRACING: CPT

## 2023-01-05 PROCEDURE — 85025 COMPLETE CBC W/AUTO DIFF WBC: CPT | Performed by: EMERGENCY MEDICINE

## 2023-01-05 PROCEDURE — 25010000002 HEPARIN (PORCINE) PER 1000 UNITS: Performed by: PHYSICIAN ASSISTANT

## 2023-01-05 PROCEDURE — 84484 ASSAY OF TROPONIN QUANT: CPT | Performed by: PHYSICIAN ASSISTANT

## 2023-01-05 PROCEDURE — 85610 PROTHROMBIN TIME: CPT | Performed by: PHYSICIAN ASSISTANT

## 2023-01-05 PROCEDURE — 71046 X-RAY EXAM CHEST 2 VIEWS: CPT

## 2023-01-05 PROCEDURE — 84484 ASSAY OF TROPONIN QUANT: CPT | Performed by: EMERGENCY MEDICINE

## 2023-01-05 RX ORDER — SODIUM CHLORIDE 0.9 % (FLUSH) 0.9 %
10 SYRINGE (ML) INJECTION EVERY 12 HOURS SCHEDULED
Status: DISCONTINUED | OUTPATIENT
Start: 2023-01-05 | End: 2023-01-10

## 2023-01-05 RX ORDER — HEPARIN SODIUM 5000 [USP'U]/ML
36 INJECTION, SOLUTION INTRAVENOUS; SUBCUTANEOUS ONCE
Status: COMPLETED | OUTPATIENT
Start: 2023-01-05 | End: 2023-01-05

## 2023-01-05 RX ORDER — DEXTROSE MONOHYDRATE 25 G/50ML
25 INJECTION, SOLUTION INTRAVENOUS
Status: DISCONTINUED | OUTPATIENT
Start: 2023-01-05 | End: 2023-01-10

## 2023-01-05 RX ORDER — HEPARIN SODIUM 10000 [USP'U]/100ML
9.01 INJECTION, SOLUTION INTRAVENOUS
Status: DISCONTINUED | OUTPATIENT
Start: 2023-01-05 | End: 2023-01-06

## 2023-01-05 RX ORDER — NICOTINE POLACRILEX 4 MG
15 LOZENGE BUCCAL
Status: DISCONTINUED | OUTPATIENT
Start: 2023-01-05 | End: 2023-01-10

## 2023-01-05 RX ORDER — SODIUM CHLORIDE 0.9 % (FLUSH) 0.9 %
10 SYRINGE (ML) INJECTION AS NEEDED
Status: DISCONTINUED | OUTPATIENT
Start: 2023-01-05 | End: 2023-01-10

## 2023-01-05 RX ORDER — NITROGLYCERIN 0.4 MG/1
0.4 TABLET SUBLINGUAL
Status: DISCONTINUED | OUTPATIENT
Start: 2023-01-05 | End: 2023-01-10

## 2023-01-05 RX ORDER — CHOLECALCIFEROL (VITAMIN D3) 125 MCG
5 CAPSULE ORAL NIGHTLY PRN
Status: DISCONTINUED | OUTPATIENT
Start: 2023-01-05 | End: 2023-01-10

## 2023-01-05 RX ORDER — SODIUM CHLORIDE, SODIUM LACTATE, POTASSIUM CHLORIDE, CALCIUM CHLORIDE 600; 310; 30; 20 MG/100ML; MG/100ML; MG/100ML; MG/100ML
100 INJECTION, SOLUTION INTRAVENOUS CONTINUOUS
Status: DISCONTINUED | OUTPATIENT
Start: 2023-01-06 | End: 2023-01-05

## 2023-01-05 RX ORDER — ASPIRIN 325 MG
325 TABLET ORAL ONCE
Status: DISCONTINUED | OUTPATIENT
Start: 2023-01-05 | End: 2023-01-06

## 2023-01-05 RX ORDER — ASPIRIN 81 MG/1
81 TABLET, CHEWABLE ORAL DAILY
Status: DISCONTINUED | OUTPATIENT
Start: 2023-01-06 | End: 2023-01-10

## 2023-01-05 RX ORDER — INSULIN LISPRO 100 [IU]/ML
0-9 INJECTION, SOLUTION INTRAVENOUS; SUBCUTANEOUS
Status: DISCONTINUED | OUTPATIENT
Start: 2023-01-05 | End: 2023-01-07

## 2023-01-05 RX ORDER — FUROSEMIDE 40 MG/1
40 TABLET ORAL DAILY
Status: DISCONTINUED | OUTPATIENT
Start: 2023-01-05 | End: 2023-01-10

## 2023-01-05 RX ORDER — SPIRONOLACTONE 25 MG/1
25 TABLET ORAL DAILY
Status: DISCONTINUED | OUTPATIENT
Start: 2023-01-05 | End: 2023-01-10

## 2023-01-05 RX ORDER — ATORVASTATIN CALCIUM 80 MG/1
80 TABLET, FILM COATED ORAL DAILY
Status: DISCONTINUED | OUTPATIENT
Start: 2023-01-05 | End: 2023-01-10

## 2023-01-05 RX ORDER — SODIUM CHLORIDE 0.9 % (FLUSH) 0.9 %
10 SYRINGE (ML) INJECTION AS NEEDED
Status: DISCONTINUED | OUTPATIENT
Start: 2023-01-05 | End: 2023-01-09

## 2023-01-05 RX ORDER — ACETAMINOPHEN 325 MG/1
650 TABLET ORAL EVERY 4 HOURS PRN
Status: DISCONTINUED | OUTPATIENT
Start: 2023-01-05 | End: 2023-01-06 | Stop reason: SDUPTHER

## 2023-01-05 RX ORDER — ONDANSETRON 4 MG/1
4 TABLET, FILM COATED ORAL EVERY 6 HOURS PRN
Status: DISCONTINUED | OUTPATIENT
Start: 2023-01-05 | End: 2023-01-10

## 2023-01-05 RX ORDER — SODIUM CHLORIDE 9 MG/ML
40 INJECTION, SOLUTION INTRAVENOUS AS NEEDED
Status: DISCONTINUED | OUTPATIENT
Start: 2023-01-05 | End: 2023-01-10

## 2023-01-05 RX ORDER — CARVEDILOL 25 MG/1
25 TABLET ORAL EVERY EVENING
Status: DISCONTINUED | OUTPATIENT
Start: 2023-01-05 | End: 2023-01-10

## 2023-01-05 RX ORDER — CARVEDILOL 25 MG/1
25 TABLET ORAL EVERY EVENING
COMMUNITY
End: 2023-01-16 | Stop reason: HOSPADM

## 2023-01-05 RX ORDER — HEPARIN SODIUM 5000 [USP'U]/ML
30-36 INJECTION, SOLUTION INTRAVENOUS; SUBCUTANEOUS EVERY 6 HOURS PRN
Status: DISCONTINUED | OUTPATIENT
Start: 2023-01-05 | End: 2023-01-09

## 2023-01-05 RX ORDER — CARVEDILOL 25 MG/1
50 TABLET ORAL EVERY MORNING
Status: DISCONTINUED | OUTPATIENT
Start: 2023-01-06 | End: 2023-01-09

## 2023-01-05 RX ORDER — ONDANSETRON 2 MG/ML
4 INJECTION INTRAMUSCULAR; INTRAVENOUS EVERY 6 HOURS PRN
Status: DISCONTINUED | OUTPATIENT
Start: 2023-01-05 | End: 2023-01-10

## 2023-01-05 RX ADMIN — HEPARIN SODIUM 4000 UNITS: 5000 INJECTION INTRAVENOUS; SUBCUTANEOUS at 21:34

## 2023-01-05 RX ADMIN — CARVEDILOL 25 MG: 25 TABLET, FILM COATED ORAL at 16:02

## 2023-01-05 RX ADMIN — FUROSEMIDE 40 MG: 40 TABLET ORAL at 16:02

## 2023-01-05 RX ADMIN — SPIRONOLACTONE 25 MG: 25 TABLET, FILM COATED ORAL at 16:02

## 2023-01-05 RX ADMIN — Medication 10 ML: at 15:10

## 2023-01-05 RX ADMIN — ATORVASTATIN CALCIUM 80 MG: 80 TABLET, FILM COATED ORAL at 16:02

## 2023-01-05 RX ADMIN — Medication 10 ML: at 21:12

## 2023-01-05 RX ADMIN — HEPARIN SODIUM 9.01 UNITS/KG/HR: 10000 INJECTION, SOLUTION INTRAVENOUS at 21:12

## 2023-01-05 RX ADMIN — INSULIN LISPRO 8 UNITS: 100 INJECTION, SOLUTION INTRAVENOUS; SUBCUTANEOUS at 16:30

## 2023-01-05 NOTE — H&P
" [facility]   HISTORY AND PHYSICAL    Patient Name: Joshua Wolf  : 1955  MRN: [id number]  Primary Care Physician:  Eric Eubanks MD  Date of admission: 2023    Subjective   Subjective     Chief Complaint:   Chief Complaint   Patient presents with   • Chest Pain         HPI:    Joshua Wolf is a 67 y.o. male with hyperlipidemia, hypertension, diabetes, atrial fibrillation, chronic systolic congestive heart failure, and CAD with previous MI, who presented to the emergency department today complaining of chest pain.  Patient states he was at the gym this morning working out when he felt unusually fatigued, sweaty, and ill.  Patient went home and laid down to rest when he developed left-sided dull, achy chest pain.  Patient states pain did radiate to his back.  He denies nausea and vomiting or shortness of breath.  Patient told his wife and they decided to call EMS.  Patient has had an MI in the past and he states this felt similar though not as severe.  Patient states his pain has since resolved.  Patient is on Xarelto for atrial fibrillation but states he has been unable to afford this recently and has been off of it for \"several months\"    Patient had STEMI and ventricular fibrillation arrest in 2017.  He had a 100% occlusion of the LAD that was stented.    ED evaluation reviewed, initial troponin is negative.  Chest x-ray shows borderline heart size, otherwise negative acute.  EKG shows atrial fibrillation, rate of 93 bpm, minimal ST depression, anterolateral leads.  I spoke with Chuyita Tate with [facility] cardiology.  We will obtain serial troponins and order exercise stress test for tomorrow.    Review of Systems   All systems were reviewed and negative except for: What is discussed in the HPI    Personal History     Past Medical History:   Diagnosis Date   • CHF (congestive heart failure) (HCC)    • Coronary artery disease    • Diabetes mellitus (HCC)    • Hyperlipidemia    • Myocardial " infarction (HCC)    • Paroxysmal A-fib (HCC)    • Seizures (HCC)        Past Surgical History:   Procedure Laterality Date   • CARDIAC CATHETERIZATION N/A 7/18/2016    Procedure: Left Heart Cath;  Surgeon: Bella Clinton MD;  Location:  [facility];  Service:    • CARDIAC CATHETERIZATION N/A 10/30/2017    Procedure: Left Heart Cath;  Surgeon: Zully Gregg MD;  Location:  [facility];  Service:    • CARDIAC CATHETERIZATION N/A 10/30/2017    Procedure: Coronary angiography;  Surgeon: Zully Gregg MD;  Location:  [facility];  Service:    • CARDIAC CATHETERIZATION N/A 10/30/2017    Procedure: Stent SANIA coronary;  Surgeon: Zully Gergg MD;  Location:  [facility];  Service:    • OK RT/LT HEART CATHETERS N/A 10/30/2017    Procedure: Percutaneous Coronary Intervention;  Surgeon: Zully Gregg MD;  Location:  [facility];  Service: Cardiovascular       Family History: family history includes Alcohol abuse in an other family member; Diabetes in his sister; Heart disease in his mother and sister; Stroke in his father; Thyroid cancer in his mother. Otherwise pertinent FHx was reviewed and not pertinent to current issue.    Social History:  reports that he quit smoking about 22 years ago. His smoking use included cigarettes. He does not have any smokeless tobacco history on file. He reports current alcohol use of about 4.0 standard drinks per week. He reports that he does not use drugs.    Home Medications:  aspirin, atorvastatin, carvedilol, furosemide, glimepiride, metFORMIN ER, potassium chloride, rivaroxaban, spironolactone, and vitamin D    Allergies:  No Known Allergies    Objective   Objective     Vitals:   Temp:  [98 °F (36.7 °C)-98.9 °F (37.2 °C)] 98.9 °F (37.2 °C)  Heart Rate:  [94-98] 95  Resp:  [17-20] 18  BP: (150-169)/() 150/98  Physical Exam     GENERAL: 67 y.o. YO male a/o x 4, NAD  SKIN: Warm pink and  dry   HEENT:  PERRL, EOM intact, conjunctivae normal, sclerae clear  NECK: supple, no JVD  LUNGS: Clear to auscultation bilaterally without wheezes, rales or rhonchi.  No accessory muscle use and no nasal flaring.  CARDIAC: Irregularly irregular, S1-S2.  No murmurs, rubs or gallops.  No peripheral edema.  Equal pulses bilaterally.  ABDOMEN: Soft, nontender, nondistended.  No guarding or rebound tenderness.  Normal bowel sounds.  MUSCULOSKELETAL: Moves all extremities well.  No deformity.  NEURO: Cranial nerves II through XII grossly intact.  No gross focal deficits.  Alert.  Normal speech and motor.  PSYCH: Normal mood and affect      Result Review    Result Review:  I have personally reviewed the results from the time of this admission to 1/5/2023 15:32 EST and agree with these findings:  [x]  Laboratory list / accordion  []  Microbiology  [x]  Radiology  []  EKG/Telemetry   []  Cardiology/Vascular   []  Pathology  [x]  Old records  []  Other:  Most notable findings include: Troponin less than 0.010x1    XR Chest 2 View    Result Date: 1/5/2023  No focal pulmonary consolidation. Borderline heart size. Follow-up as clinical indications persist.  This report was finalized on 1/5/2023 9:17 AM by Dr. Glenn Mcpherson M.D.                  Assessment & Plan   Assessment / Plan     Brief Patient Summary:  Joshua Wolf is a 67 y.o. male who is being admitted to observation unit for further evaluation of chest pain.  Discussed with Chuyita Tate with cardiology, we will check serial troponin and EKG and plan for exercise stress test tomorrow.    Active Hospital Problems:  Active Hospital Problems    Diagnosis    • **Chest pain      Plan:     Chest pain  CAD, history of STEMI  -Consult cardiology  -Troponin less than 0.010 x2, trend  -ECG reviewed, nonischemic, repeat in a.m.  -N.p.o. after midnight, IVF  -Nitroglycerin as needed chest pain  -Hold beta-blocker in AM in anticipation of stress test  -Monitor    Atrial  "fibrillation  -Currently in A. fib, rate controlled  -Patient is on Xarelto for atrial fibrillation but states he has been unable to afford this recently and has been off of it for \"several months\"    Chronic congestive heart failure  -Continue Lasix  -Euvolemic, no exacerbation at time of admission  -Judicial use of IV fluids, we will not hydrate overnight    Diabetes  -non- insulin dependent   -Accu-Checks 3 times daily with meals  -Moderate dose correctional factor with hypoglycemic protocol  -Hold oral diabetic medication    Hypertension/hyperlipidemia  -Chronic conditions  -Continue home medications as prescribed        DVT prophylaxis:  Medical and mechanical DVT prophylaxis orders are present.    CODE STATUS:    Level Of Support Discussed With: Patient  Code Status (Patient has no pulse and is not breathing): CPR (Attempt to Resuscitate)  Medical Interventions (Patient has pulse or is breathing): Full Support    Admission Status:  I believe this patient meets observation status.    83 minutes has been spent by [facility] providers in the care of this patient while under observation status    I wore an N95 mask, face shield, and gloves during this patient encounter. Patient also wearing a surgical mask throughout encounter. Hand hygeine performed before and after seeing the patient.    Electronically signed by IZA Luna, 01/05/23, 3:32 PM EST.           "

## 2023-01-05 NOTE — Clinical Note
Prepped: Right Wrist. Prepped with: Hibiclens. The site was clipped. The patient was draped in a sterile fashion.

## 2023-01-05 NOTE — PROGRESS NOTES
Clinical Pharmacy Services: Medication History    Joshua Wolf is a 67 y.o. male presenting to [facility] for   Chief Complaint   Patient presents with   • Chest Pain       He  has a past medical history of CHF (congestive heart failure) (HCC), Coronary artery disease, Diabetes mellitus (HCC), Hyperlipidemia, Myocardial infarction (HCC), Paroxysmal A-fib (HCC), and Seizures ([facility]).    Allergies as of 01/05/2023   • (No Known Allergies)       Medication information was obtained from: Patient   Pharmacy and Phone Number:     Prior to Admission Medications     Prescriptions Last Dose Informant Patient Reported? Taking?    aspirin 81 MG chewable tablet 1/5/2023 Self Yes Yes    Chew 81 mg Daily.    furosemide (LASIX) 40 MG tablet 1/5/2023 Self Yes Yes    Take 40 mg by mouth daily.    glimepiride (AMARYL) 4 MG tablet 1/5/2023 Self No Yes    Take 1 tablet by mouth Every Morning Before Breakfast.    metFORMIN ER (GLUCOPHAGE-XR) 500 MG 24 hr tablet 1/5/2023 Self No Yes    Take 2 tablets by mouth Daily With Breakfast.    potassium chloride (K-DUR,KLOR-CON) 20 MEQ CR tablet Past Month Self Yes Yes    Take 20 mEq by mouth daily.    spironolactone (ALDACTONE) 25 MG tablet Past Month Self Yes Yes    Take 25 mg by mouth Daily.    vitamin D (ERGOCALCIFEROL) 1.25 MG (66962 UT) capsule capsule 1/5/2023 Self Yes Yes    Take 50,000 Units by mouth Every 7 (Seven) Days.    atorvastatin (LIPITOR) 80 MG tablet More than a month Self No No    Take 1 tablet by mouth Daily.    carvedilol (COREG) 25 MG tablet More than a month Self Yes No    Take 25 mg by mouth Every Evening.    carvedilol (COREG) 25 MG tablet More than a month Self Yes No    Take 50 mg by mouth Every Morning.    XARELTO 20 MG tablet More than a month Self Yes No    Take 20 mg by mouth Daily With Dinner.            Medication notes: Patient states he has not taken his Lipitor, carvedilol, or Xarelto in over a month because of issues with his insurance. Patient  states he was waiting for the 1st of the year to be able to get those refilled.     This medication list is complete to the best of my knowledge as of 1/5/2023    Please call if questions.    Yash Paul  Medication History Technician  [phone]    1/5/2023 13:28 EST

## 2023-01-05 NOTE — ED TRIAGE NOTES
Pt to ed from home via EMS. Pt c/o CP that started after getting home from gym. Pt Has cardiac hx. PTA pt had 325mg aspirin. Pt in no pain at time of triage.

## 2023-01-05 NOTE — Clinical Note
Hemostasis started on the right radial artery. R-Band was used in achieving hemostasis. Radial compression device applied to vessel. Hemostasis achieved successfully. Closure device additional comment: Comfort band

## 2023-01-05 NOTE — PLAN OF CARE
Goal Outcome Evaluation:   Pt admitted for chest pain. Pt states he started having pain during his workout. Pt denies pain since admission to OBS. Wife and son at bedside. VSS. Alert and oriented. Up at jacob. Cards consulted. Npo at midnight.

## 2023-01-05 NOTE — ED PROVIDER NOTES
" EMERGENCY DEPARTMENT ENCOUNTER    CHIEF COMPLAINT  Chief Complaint: Chest pain  History given by: Patient  History limited by: Nothing  Room Number: 12/12  PMD: Eric Eubanks MD  Cardiologist: Dr. Sebastien Mc    HPI:  Pt is a 67 y.o. male presents complaining of left anterior chest discomfort aching/dull in nature radiating to his back onset at 815 this morning lasted approximately half an hour, resolved prior to arrival in the emergency department.  Patient reports his pain was a 4 out of 10, similar in nature but less in intensity than his myocardial infarction in 2017.  Patient denies recent cough, cold, fever, abdominal pain, does report he felt somewhat clammy prior to the discomfort, denies changes to urinary or bowel habits, swelling in extremities, visual or speech disturbance, unilateral weakness or numbness.  Patient has a history of A. fib, has been out of his medications including Coreg and Xarelto for several months,      Aggravating Factors: Nothing  Alleviating Factors: Aspirin  Treatment before arrival: Aspirin  Chronic or social conditions impacting care: Patient \"messed up\" and had a gap in insurance coverage and has not been able to afford any of his medicines including Xarelto    Additional sources:  Discussed/ obtained information from independent historians: Patient gave his history    External (non-ED) record review: Patient with a history of myocardial infarction in October 2017 with EF 25 to 30% with stent to the LAD, resuscitated V. fib arrest        PAST MEDICAL HISTORY  Active Ambulatory Problems     Diagnosis Date Noted   • Obesity (BMI 30.0-34.9) 07/18/2016   • CHF (congestive heart failure), NYHA class I ([facility]) 07/18/2016   • Type 2 diabetes mellitus without complication ([facility]) 07/29/2016   • Hyperlipidemia 07/29/2016   • Coronary artery disease 10/25/2016   • Anterior myocardial infarction (HCC) 10/30/2017   • Skin lesion 04/03/2018     Resolved Ambulatory Problems     Diagnosis Date " Noted   • Acute anterior wall MI (HCC) 2016     Past Medical History:   Diagnosis Date   • CHF (congestive heart failure) (HCC)    • Diabetes mellitus (HCC)    • Myocardial infarction (HCC)    • Paroxysmal A-fib (HCC)    • Seizures (HCC)        PAST SURGICAL HISTORY  Past Surgical History:   Procedure Laterality Date   • CARDIAC CATHETERIZATION N/A 2016    Procedure: Left Heart Cath;  Surgeon: Bella Clinton MD;  Location:  [facility];  Service:    • CARDIAC CATHETERIZATION N/A 10/30/2017    Procedure: Left Heart Cath;  Surgeon: Zully Gregg MD;  Location:  [facility];  Service:    • CARDIAC CATHETERIZATION N/A 10/30/2017    Procedure: Coronary angiography;  Surgeon: Zully Gregg MD;  Location:  [facility];  Service:    • CARDIAC CATHETERIZATION N/A 10/30/2017    Procedure: Stent SANIA coronary;  Surgeon: Zully Gregg MD;  Location:  [facility];  Service:    • WA RT/LT HEART CATHETERS N/A 10/30/2017    Procedure: Percutaneous Coronary Intervention;  Surgeon: Zully Gregg MD;  Location:  [facility];  Service: Cardiovascular       FAMILY HISTORY  Family History   Problem Relation Age of Onset   • Heart disease Mother    • Thyroid cancer Mother    • Stroke Father    • Heart disease Sister    • Diabetes Sister    • Alcohol abuse Other        SOCIAL HISTORY  Social History     Socioeconomic History   • Marital status:    Tobacco Use   • Smoking status: Former     Years: 15.00     Types: Cigarettes     Quit date:      Years since quittin.0   Substance and Sexual Activity   • Alcohol use: Yes     Alcohol/week: 4.0 standard drinks     Types: 4 Glasses of wine per week   • Drug use: No       ALLERGIES  Patient has no known allergies.    REVIEW OF SYSTEMS  Review of Systems   Constitutional: Positive for diaphoresis. Negative for chills and fever.   HENT: Negative for sore throat and trouble  swallowing.    Eyes: Negative for visual disturbance.   Respiratory: Negative for cough and shortness of breath.    Cardiovascular: Positive for chest pain. Negative for leg swelling.   Gastrointestinal: Negative for abdominal pain, diarrhea and vomiting.   Endocrine: Negative.    Genitourinary: Negative for decreased urine volume and frequency.   Musculoskeletal: Negative for neck pain.   Skin: Negative for rash.   Allergic/Immunologic: Negative.    Neurological: Negative for weakness and numbness.   Hematological: Negative.    Psychiatric/Behavioral: Negative.    All other systems reviewed and are negative.      PHYSICAL EXAM  ED Triage Vitals [01/05/23 0837]   Temp Heart Rate Resp BP SpO2   98 °F (36.7 °C) 98 20 (!) 169/102 97 %      Temp src Heart Rate Source Patient Position BP Location FiO2 (%)   Tympanic Monitor Lying Right arm --       Physical Exam  Vitals and nursing note reviewed.   Constitutional:       General: He is in acute distress.   HENT:      Head: Normocephalic and atraumatic.   Cardiovascular:      Rate and Rhythm: Normal rate and regular rhythm.      Pulses:           Posterior tibial pulses are 2+ on the right side and 2+ on the left side.      Heart sounds: Normal heart sounds. No murmur heard.  Pulmonary:      Effort: Pulmonary effort is normal. No respiratory distress.      Breath sounds: Normal breath sounds. No wheezing.   Abdominal:      General: Bowel sounds are normal.      Palpations: Abdomen is soft.      Tenderness: There is no abdominal tenderness. There is no guarding or rebound.   Musculoskeletal:         General: Normal range of motion.      Cervical back: Normal range of motion.   Skin:     General: Skin is warm and dry.   Neurological:      Mental Status: He is alert and oriented to person, place, and time.   Psychiatric:         Mood and Affect: Affect normal.         LAB RESULTS  Recent Results (from the past 24 hour(s))   ECG 12 Lead Chest Pain    Collection Time: 01/05/23   8:53 AM   Result Value Ref Range    QT Interval 375 ms   Comprehensive Metabolic Panel    Collection Time: 01/05/23  8:58 AM    Specimen: Blood   Result Value Ref Range    Glucose 391 (H) 65 - 99 mg/dL    BUN 22 8 - 23 mg/dL    Creatinine 1.11 0.76 - 1.27 mg/dL    Sodium 133 (L) 136 - 145 mmol/L    Potassium 4.2 3.5 - 5.2 mmol/L    Chloride 101 98 - 107 mmol/L    CO2 23.9 22.0 - 29.0 mmol/L    Calcium 9.0 8.6 - 10.5 mg/dL    Total Protein 6.5 6.0 - 8.5 g/dL    Albumin 4.0 3.5 - 5.2 g/dL    ALT (SGPT) 20 1 - 41 U/L    AST (SGOT) 12 1 - 40 U/L    Alkaline Phosphatase 100 39 - 117 U/L    Total Bilirubin 0.8 0.0 - 1.2 mg/dL    Globulin 2.5 gm/dL    A/G Ratio 1.6 g/dL    BUN/Creatinine Ratio 19.8 7.0 - 25.0    Anion Gap 8.1 5.0 - 15.0 mmol/L    eGFR 73.7 >60.0 mL/min/1.73   Troponin    Collection Time: 01/05/23  8:58 AM    Specimen: Blood   Result Value Ref Range    Troponin T <0.010 0.000 - 0.030 ng/mL   Green Top (Gel)    Collection Time: 01/05/23  8:58 AM   Result Value Ref Range    Extra Tube Hold for add-ons.    Lavender Top    Collection Time: 01/05/23  8:58 AM   Result Value Ref Range    Extra Tube hold for add-on    Gold Top - SST    Collection Time: 01/05/23  8:58 AM   Result Value Ref Range    Extra Tube Hold for add-ons.    Light Blue Top    Collection Time: 01/05/23  8:58 AM   Result Value Ref Range    Extra Tube Hold for add-ons.    CBC Auto Differential    Collection Time: 01/05/23  8:58 AM    Specimen: Blood   Result Value Ref Range    WBC 6.06 3.40 - 10.80 10*3/mm3    RBC 5.22 4.14 - 5.80 10*6/mm3    Hemoglobin 15.7 13.0 - 17.7 g/dL    Hematocrit 47.0 37.5 - 51.0 %    MCV 90.0 79.0 - 97.0 fL    MCH 30.1 26.6 - 33.0 pg    MCHC 33.4 31.5 - 35.7 g/dL    RDW 12.1 (L) 12.3 - 15.4 %    RDW-SD 40.1 37.0 - 54.0 fl    MPV 10.6 6.0 - 12.0 fL    Platelets 142 140 - 450 10*3/mm3   Manual Differential    Collection Time: 01/05/23  8:58 AM    Specimen: Blood   Result Value Ref Range    Neutrophil % 71.0 42.7 - 76.0 %     Lymphocyte % 18.0 (L) 19.6 - 45.3 %    Monocyte % 8.0 5.0 - 12.0 %    Eosinophil % 1.0 0.3 - 6.2 %    Atypical Lymphocyte % 2.0 0.0 - 5.0 %    Neutrophils Absolute 4.30 1.70 - 7.00 10*3/mm3    Lymphocytes Absolute 1.21 0.70 - 3.10 10*3/mm3    Monocytes Absolute 0.48 0.10 - 0.90 10*3/mm3    Eosinophils Absolute 0.06 0.00 - 0.40 10*3/mm3    RBC Morphology Normal Normal    WBC Morphology Normal Normal    Platelet Morphology Normal Normal   POC Glucose Once    Collection Time: 01/05/23  1:48 PM    Specimen: Blood   Result Value Ref Range    Glucose 303 (H) 70 - 130 mg/dL   Troponin    Collection Time: 01/05/23  08:58 PM    Specimen: Blood   Result Value Ref Range    Troponin T <0.010 0.000 - 0.030 ng/mL   POC Glucose Once    Collection Time: 01/05/23  4:09 PM    Specimen: Blood   Result Value Ref Range    Glucose 352 (H) 70 - 130 mg/dL       I ordered the above labs and reviewed the results    RADIOLOGY  XR Chest 2 View    Result Date: 1/5/2023  XR CHEST 2 VW-  HISTORY: Male who is 67 years-old,  chest pain  TECHNIQUE: Frontal and lateral views of the chest  COMPARISON: 10/30/2017  FINDINGS: The heart size is borderline. Pulmonary vasculature is unremarkable. No focal pulmonary consolidation, pleural effusion, or pneumothorax. Right hemidiaphragm remains elevated. No acute osseous process.      No focal pulmonary consolidation. Borderline heart size. Follow-up as clinical indications persist.  This report was finalized on 1/5/2023 9:17 AM by Dr. Glenn Mcpherson M.D.        I ordered the above noted radiological studies. Interpreted by radiologist. Viewed by me in PACS.       PROCEDURES  Procedures      MEDICATIONS GIVEN IN THE EMERGENCY DEPARTMENT  Medications   sodium chloride 0.9 % flush 10 mL (has no administration in time range)   aspirin tablet 325 mg (325 mg Oral Not Given 1/5/23 0857)           MEDICAL DECISION MAKING  Results were reviewed/discussed with the patient and they were also made aware of online  access. Pt also made aware that some labs, such as cultures, will not be resulted during ER visit and followup with PMD is necessary.   EKGs and labs independently viewed and interpreted by me.  Discussion below represents my analysis of pertinent findings related to patient's condition, differential diagnosis, treatment plan and final disposition.    Differential diagnosis My differential diagnosis for chest pain includes but is not limited to:  Muscle strain, costochondritis, myositis, pleurisy, rib fracture, intercostal neuritis, herpes zoster, tumor, myocardial infarction, coronary syndrome, unstable angina, angina, aortic dissection, mitral valve prolapse, pericarditis, palpitations, pulmonary embolus, pneumonia, pneumothorax, lung cancer, GERD, esophagitis, esophageal spasm      Independent interpretation of labs, radiology studies, and discussions with consultants:  ED Course as of 01/05/23 1613   Thu Jan 05, 2023   1250 Discussed with BREANNE Daly on-call for Dr. Mc who is aware if patient's presentation, imaging, labs and requests patient go to the observation unit, Dr. Mc or one of his partners will see in consult, for further testing, treatment as needed.,  Anticipate provocative testing tomorrow if serial enzymes negative with observation overnight [TO]   1318 Discussed with IZA Wood on-call for the observation unit who is aware of patient's presentation, imaging, my discussion with cardiology and she agrees to admit to the observation unit under the care of Dr. Elver Meneses for further testing, treatment as needed.   [TO]      ED Course User Index  [TO] Agnieszka Mercer MD     EKG prehospital          EKG time: 08 33  Rhythm/Rate: Sinus rhythm, rate in the 90s  P waves and NV: Left atrial enlargement, first-degree AV block  QRS, axis: NS IVCD  ST and T waves: Nonspecific ST/T wave findings    Interpreted Contemporaneously by me, independently viewed  changed compared to prior  10/31/2017, NS IVCD new, ST/T wave findings somewhat improved    EKG          EKG time: 08 50  Rhythm/Rate: A. fib, rate in the 90s  P waves and ND: Not well appreciated  QRS, axis: Poor R wave progression  ST and T waves: Nonspecific ST/T wave findings diffuse    Interpreted Contemporaneously by me, independently viewed  ST findings improved changed compared to prior 10/31/2017    Heart score 6      MDM       DIAGNOSIS  Final diagnoses:   Chest pain, unspecified type       DISPOSITION  ADMISSION    Discussed treatment plan and reason for admission with pt/family and admitting physician.  Pt/family voiced understanding of the plan for admission for further testing/treatment as needed.         Latest Documented Vital Signs:  As of 09:27 EST  BP- 150/98 HR- 94 Temp- 98 °F (36.7 °C) (Tympanic) O2 sat- 96%    --  Full protective equipment was worn throughout this patient encounter including a face mask, eye protection and gloves. Hand hygiene was performed before donning protective equipment and after removal when leaving the room.     Please note that portions of this note were completed with a voice recognition program.       Note Disclaimer: At [facility], we believe that sharing information builds trust and better relationships. You are receiving this note because you are receiving care at [facility] or recently visited. It is possible you will see health information before a provider has talked with you about it. This kind of information can be easy to misunderstand. To help you fully understand what it means for your health, we urge you to discuss this note with your provider.     Agnieszka Mercer MD  01/05/23 1106       Agnieszka Mercer MD  01/05/23 1719

## 2023-01-05 NOTE — CASE MANAGEMENT/SOCIAL WORK
Discharge Planning Assessment  [facility]     Patient Name: Joshua Wolf  MRN: [id number]  Today's Date: 1/5/2023    Admit Date: 1/5/2023    Plan: Pt intends to return home upon discharge   Discharge Needs Assessment     Row Name 01/05/23 1357       Living Environment    People in Home spouse    Name(s) of People in Home Chuyita Wolf [phone]    Current Living Arrangements home    Primary Care Provided by self    Provides Primary Care For no one    Family Caregiver if Needed spouse    Family Caregiver Names Chuyita Wolf    Quality of Family Relationships helpful;involved;supportive    Able to Return to Prior Arrangements yes       Resource/Environmental Concerns    Resource/Environmental Concerns none    Transportation Concerns none       Transition Planning    Patient/Family Anticipates Transition to home;home with family    Patient/Family Anticipated Services at Transition none    Transportation Anticipated car, drives self;family or friend will provide       Discharge Needs Assessment    Readmission Within the Last 30 Days no previous admission in last 30 days    Equipment Currently Used at Home none    Concerns to be Addressed no discharge needs identified;denies needs/concerns at this time    Anticipated Changes Related to Illness none    Equipment Needed After Discharge none    Provided Post Acute Provider List? N/A    Provided Post Acute Provider Quality & Resource List? N/A               Discharge Plan     Row Name 01/05/23 1358       Plan    Plan Pt intends to return home upon discharge    Patient/Family in Agreement with Plan yes    Provided Post Acute Provider List? N/A    Provided Post Acute Provider Quality & Resource List? N/A    Plan Comments Spoke with pt at [facility]. Introduced self and role of CCP.  Verified face sheet and role of CCP. Pts PCP is Eric Eubanks. Requested Med-[facility]ist to screen pt, as pt stated that he picks and chooses what medications he will fill since he has no insurance. Pt  preferred pharmacy is [facility]. Pt is independent in ADL's and denies the need for assistive devices or community resources, including a Good Rx card. Advised pt that if any further needs arise, to contact case management              Continued Care and Services - Admitted Since 1/5/2023    Coordination has not been started for this encounter.          Demographic Summary    No documentation.                Functional Status    No documentation.                Psychosocial    No documentation.                Abuse/Neglect    No documentation.                Legal    No documentation.                Substance Abuse    No documentation.                Patient Forms    No documentation.                   Richelle Verma RN

## 2023-01-05 NOTE — PROGRESS NOTES
"Patient's troponin is trending up, 0.143, 0.178, spoke with [facility] cardiology, Perla Crowell, they have requested low-dose heparin drip with initial bolus, n.p.o. after midnight, EKG and troponin in the morning.  They have requested we call back if patient develops chest pain or if next troponin is higher than the last.    Note patient has Xarelto on his med list due to atrial fibrillation.  However, patient states he has been unable to afford it lately and has not taken it for \"several months\"    IZA Luna  "

## 2023-01-05 NOTE — ED NOTES
"Nursing report ED to floor  Joshua Wolf  67 y.o.  male    HPI :   Chief Complaint   Patient presents with    Chest Pain       Admitting doctor:   Elver Meneses MD    Admitting diagnosis:   There were no encounter diagnoses.    Code status:   Current Code Status       Date Active Code Status Order ID Comments User Context       1/5/2023 1319 CPR (Attempt to Resuscitate) [id number]  Abbie Pina PA ED        Question Answer    Code Status (Patient has no pulse and is not breathing) CPR (Attempt to Resuscitate)    Medical Interventions (Patient has pulse or is breathing) Full Support    Level Of Support Discussed With Patient                    Allergies:   Patient has no known allergies.    Isolation:   No active isolations    Intake and Output  No intake or output data in the 24 hours ending 01/05/23 1327    Weight:       01/05/23  0903   Weight: 111 kg (245 lb)       Most recent vitals:   Vitals:    01/05/23 0837 01/05/23 0903   BP: (!) 169/102 (!) 155/102   BP Location: Right arm Right arm   Patient Position: Lying Lying   Pulse: 98 94   Resp: 20 17   Temp: 98 °F (36.7 °C)    TempSrc: Tympanic    SpO2: 97% 96%   Weight: 111 kg (245 lb) 111 kg (245 lb)   Height: 175.3 cm (69\") 175.3 cm (69.02\")       Active LDAs/IV Access:   Lines, Drains & Airways       Active LDAs       Name Placement date Placement time Site Days    Peripheral IV 01/05/23 0839 Left Antecubital 01/05/23  0839  Antecubital  less than 1                    Labs (abnormal labs have a star):   Labs Reviewed   COMPREHENSIVE METABOLIC PANEL - Abnormal; Notable for the following components:       Result Value    Glucose 391 (*)     Sodium 133 (*)     All other components within normal limits    Narrative:     GFR Normal >60  Chronic Kidney Disease <60  Kidney Failure <15     CBC WITH AUTO DIFFERENTIAL - Abnormal; Notable for the following components:    RDW 12.1 (*)     All other components within normal limits   MANUAL DIFFERENTIAL - " Abnormal; Notable for the following components:    Lymphocyte % 18.0 (*)     All other components within normal limits   TROPONIN (IN-HOUSE) - Normal    Narrative:     Troponin T Reference Range:  <= 0.03 ng/mL-   Negative for AMI  >0.03 ng/mL-     Abnormal for myocardial necrosis.  Clinicians would have to utilize clinical acumen, EKG, Troponin and serial changes to determine if it is an Acute Myocardial Infarction or myocardial injury due to an underlying chronic condition.       Results may be falsely decreased if patient taking Biotin.     RAINBOW DRAW    Narrative:     The following orders were created for panel order [facility] Draw.  Procedure                               Abnormality         Status                     ---------                               -----------         ------                     Green Top (Gel)[[id number]]                                  Final result               Lavender Top[[id number]]                                     Final result               Gold Top - SST[[id number]]                                   Final result               Light Blue Top[[id number]]                                   Final result                 Please view results for these tests on the individual orders.   TROPONIN (IN-HOUSE)   POCT GLUCOSE FINGERSTICK   POCT GLUCOSE FINGERSTICK   POCT GLUCOSE FINGERSTICK   CBC AND DIFFERENTIAL    Narrative:     The following orders were created for panel order CBC & Differential.  Procedure                               Abnormality         Status                     ---------                               -----------         ------                     CBC Auto Differential[[id number]]        Abnormal            Final result                 Please view results for these tests on the individual orders.   GREEN TOP   LAVENDER TOP   GOLD TOP - SST   LIGHT BLUE TOP       EKG:   ECG 12 Lead Chest Pain   Preliminary Result   HEART RATE= 93  bpm   RR Interval= 645  ms   MA Interval=    ms   P Horizontal Axis=   deg   P Front Axis=   deg   QRSD Interval= 96  ms   QT Interval= 375  ms   QRS Axis= -4  deg   T Wave Axis= 74  deg   - ABNORMAL ECG -   Atrial fibrillation   Low voltage, extremity leads   Abnormal R-wave progression, late transition   Minimal ST depression, anterolateral leads   Electronically Signed By:    Date and Time of Study: 2023-01-05 08:53:14          Meds given in ED:   Medications   sodium chloride 0.9 % flush 10 mL (has no administration in time range)   aspirin tablet 325 mg (325 mg Oral Not Given 1/5/23 0857)   nitroglycerin (NITROSTAT) SL tablet 0.4 mg (has no administration in time range)   sodium chloride 0.9 % flush 10 mL (has no administration in time range)   sodium chloride 0.9 % flush 10 mL (has no administration in time range)   sodium chloride 0.9 % infusion 40 mL (has no administration in time range)   ondansetron (ZOFRAN) tablet 4 mg (has no administration in time range)     Or   ondansetron (ZOFRAN) injection 4 mg (has no administration in time range)   lactated ringers infusion (has no administration in time range)   acetaminophen (TYLENOL) tablet 650 mg (has no administration in time range)   melatonin tablet 5 mg (has no administration in time range)   dextrose (GLUTOSE) oral gel 15 g (has no administration in time range)   dextrose (D50W) (25 g/50 mL) IV injection 25 g (has no administration in time range)   glucagon (human recombinant) (GLUCAGEN DIAGNOSTIC) injection 1 mg (has no administration in time range)   insulin lispro (ADMELOG) injection 0-9 Units (has no administration in time range)       Imaging results:  XR Chest 2 View    Result Date: 1/5/2023  No focal pulmonary consolidation. Borderline heart size. Follow-up as clinical indications persist.  This report was finalized on 1/5/2023 9:17 AM by Dr. Glenn Mcpherson M.D.       Ambulatory status:   Adlib    Social issues:   Social History     Socioeconomic History    Marital status:     Tobacco Use    Smoking status: Former     Years: 15.00     Types: Cigarettes     Quit date:      Years since quittin.0   Substance and Sexual Activity    Alcohol use: Yes     Alcohol/week: 4.0 standard drinks     Types: 4 Glasses of wine per week    Drug use: No       NIH Stroke Scale:         Quin Zamorano RN  23 13:27 EST

## 2023-01-06 ENCOUNTER — APPOINTMENT (OUTPATIENT)
Dept: NUCLEAR MEDICINE | Facility: HOSPITAL | Age: 68
DRG: 234 | End: 2023-01-06
Payer: MEDICARE

## 2023-01-06 PROBLEM — R07.9 CHEST PAIN, UNSPECIFIED TYPE: Status: ACTIVE | Noted: 2023-01-06

## 2023-01-06 LAB
ACT BLD: 269 SECONDS (ref 82–152)
ANION GAP SERPL CALCULATED.3IONS-SCNC: 13.6 MMOL/L (ref 5–15)
APTT PPP: 107.1 SECONDS (ref 22.7–35.4)
APTT PPP: 39.8 SECONDS (ref 22.7–35.4)
APTT PPP: 86 SECONDS (ref 22.7–35.4)
BASOPHILS # BLD AUTO: 0.04 10*3/MM3 (ref 0–0.2)
BASOPHILS NFR BLD AUTO: 0.5 % (ref 0–1.5)
BUN SERPL-MCNC: 21 MG/DL (ref 8–23)
BUN/CREAT SERPL: 19.3 (ref 7–25)
CALCIUM SPEC-SCNC: 8.8 MG/DL (ref 8.6–10.5)
CHLORIDE SERPL-SCNC: 101 MMOL/L (ref 98–107)
CO2 SERPL-SCNC: 23.4 MMOL/L (ref 22–29)
CREAT SERPL-MCNC: 1.09 MG/DL (ref 0.76–1.27)
DEPRECATED RDW RBC AUTO: 39.6 FL (ref 37–54)
EGFRCR SERPLBLD CKD-EPI 2021: 75.3 ML/MIN/1.73
EOSINOPHIL # BLD AUTO: 0.23 10*3/MM3 (ref 0–0.4)
EOSINOPHIL NFR BLD AUTO: 3 % (ref 0.3–6.2)
ERYTHROCYTE [DISTWIDTH] IN BLOOD BY AUTOMATED COUNT: 12.4 % (ref 12.3–15.4)
GLUCOSE BLDC GLUCOMTR-MCNC: 221 MG/DL (ref 70–130)
GLUCOSE BLDC GLUCOMTR-MCNC: 227 MG/DL (ref 70–130)
GLUCOSE BLDC GLUCOMTR-MCNC: 272 MG/DL (ref 70–130)
GLUCOSE BLDC GLUCOMTR-MCNC: 275 MG/DL (ref 70–130)
GLUCOSE SERPL-MCNC: 269 MG/DL (ref 65–99)
HCT VFR BLD AUTO: 46.9 % (ref 37.5–51)
HGB BLD-MCNC: 16.1 G/DL (ref 13–17.7)
IMM GRANULOCYTES # BLD AUTO: 0.04 10*3/MM3 (ref 0–0.05)
IMM GRANULOCYTES NFR BLD AUTO: 0.5 % (ref 0–0.5)
LYMPHOCYTES # BLD AUTO: 1.87 10*3/MM3 (ref 0.7–3.1)
LYMPHOCYTES NFR BLD AUTO: 24.1 % (ref 19.6–45.3)
MAXIMAL PREDICTED HEART RATE: 155 BPM
MCH RBC QN AUTO: 30.3 PG (ref 26.6–33)
MCHC RBC AUTO-ENTMCNC: 34.3 G/DL (ref 31.5–35.7)
MCV RBC AUTO: 88.2 FL (ref 79–97)
MONOCYTES # BLD AUTO: 0.76 10*3/MM3 (ref 0.1–0.9)
MONOCYTES NFR BLD AUTO: 9.8 % (ref 5–12)
NEUTROPHILS NFR BLD AUTO: 4.82 10*3/MM3 (ref 1.7–7)
NEUTROPHILS NFR BLD AUTO: 62.1 % (ref 42.7–76)
NRBC BLD AUTO-RTO: 0 /100 WBC (ref 0–0.2)
PLATELET # BLD AUTO: 146 10*3/MM3 (ref 140–450)
PMV BLD AUTO: 10.3 FL (ref 6–12)
POTASSIUM SERPL-SCNC: 3.7 MMOL/L (ref 3.5–5.2)
QT INTERVAL: 418 MS
RBC # BLD AUTO: 5.32 10*6/MM3 (ref 4.14–5.8)
SODIUM SERPL-SCNC: 138 MMOL/L (ref 136–145)
STRESS TARGET HR: 132 BPM
TROPONIN T SERPL-MCNC: 0.14 NG/ML (ref 0–0.03)
TROPONIN T SERPL-MCNC: 0.16 NG/ML (ref 0–0.03)
WBC NRBC COR # BLD: 7.76 10*3/MM3 (ref 3.4–10.8)

## 2023-01-06 PROCEDURE — 93005 ELECTROCARDIOGRAM TRACING: CPT | Performed by: PHYSICIAN ASSISTANT

## 2023-01-06 PROCEDURE — B2111ZZ FLUOROSCOPY OF MULTIPLE CORONARY ARTERIES USING LOW OSMOLAR CONTRAST: ICD-10-PCS | Performed by: INTERNAL MEDICINE

## 2023-01-06 PROCEDURE — 99152 MOD SED SAME PHYS/QHP 5/>YRS: CPT | Performed by: INTERNAL MEDICINE

## 2023-01-06 PROCEDURE — G0378 HOSPITAL OBSERVATION PER HR: HCPCS

## 2023-01-06 PROCEDURE — A9500 TC99M SESTAMIBI: HCPCS | Performed by: EMERGENCY MEDICINE

## 2023-01-06 PROCEDURE — 93458 L HRT ARTERY/VENTRICLE ANGIO: CPT | Performed by: INTERNAL MEDICINE

## 2023-01-06 PROCEDURE — 78452 HT MUSCLE IMAGE SPECT MULT: CPT

## 2023-01-06 PROCEDURE — B2151ZZ FLUOROSCOPY OF LEFT HEART USING LOW OSMOLAR CONTRAST: ICD-10-PCS | Performed by: INTERNAL MEDICINE

## 2023-01-06 PROCEDURE — C1887 CATHETER, GUIDING: HCPCS | Performed by: INTERNAL MEDICINE

## 2023-01-06 PROCEDURE — 25010000002 HEPARIN (PORCINE) PER 1000 UNITS: Performed by: INTERNAL MEDICINE

## 2023-01-06 PROCEDURE — 0 TECHNETIUM SESTAMIBI: Performed by: EMERGENCY MEDICINE

## 2023-01-06 PROCEDURE — 85730 THROMBOPLASTIN TIME PARTIAL: CPT | Performed by: INTERNAL MEDICINE

## 2023-01-06 PROCEDURE — 0 IOPAMIDOL PER 1 ML: Performed by: INTERNAL MEDICINE

## 2023-01-06 PROCEDURE — 93017 CV STRESS TEST TRACING ONLY: CPT

## 2023-01-06 PROCEDURE — C1894 INTRO/SHEATH, NON-LASER: HCPCS | Performed by: INTERNAL MEDICINE

## 2023-01-06 PROCEDURE — 93571 IV DOP VEL&/PRESS C FLO 1ST: CPT | Performed by: INTERNAL MEDICINE

## 2023-01-06 PROCEDURE — 82962 GLUCOSE BLOOD TEST: CPT

## 2023-01-06 PROCEDURE — 4A023N7 MEASUREMENT OF CARDIAC SAMPLING AND PRESSURE, LEFT HEART, PERCUTANEOUS APPROACH: ICD-10-PCS | Performed by: INTERNAL MEDICINE

## 2023-01-06 PROCEDURE — C1769 GUIDE WIRE: HCPCS | Performed by: INTERNAL MEDICINE

## 2023-01-06 PROCEDURE — 80048 BASIC METABOLIC PNL TOTAL CA: CPT | Performed by: PHYSICIAN ASSISTANT

## 2023-01-06 PROCEDURE — 93010 ELECTROCARDIOGRAM REPORT: CPT | Performed by: INTERNAL MEDICINE

## 2023-01-06 PROCEDURE — 85025 COMPLETE CBC W/AUTO DIFF WBC: CPT | Performed by: PHYSICIAN ASSISTANT

## 2023-01-06 PROCEDURE — 84484 ASSAY OF TROPONIN QUANT: CPT | Performed by: PHYSICIAN ASSISTANT

## 2023-01-06 PROCEDURE — 25010000002 MIDAZOLAM PER 1 MG: Performed by: INTERNAL MEDICINE

## 2023-01-06 PROCEDURE — 25010000002 FENTANYL CITRATE (PF) 50 MCG/ML SOLUTION: Performed by: INTERNAL MEDICINE

## 2023-01-06 PROCEDURE — 85730 THROMBOPLASTIN TIME PARTIAL: CPT | Performed by: STUDENT IN AN ORGANIZED HEALTH CARE EDUCATION/TRAINING PROGRAM

## 2023-01-06 PROCEDURE — 99153 MOD SED SAME PHYS/QHP EA: CPT | Performed by: INTERNAL MEDICINE

## 2023-01-06 PROCEDURE — 85347 COAGULATION TIME ACTIVATED: CPT

## 2023-01-06 PROCEDURE — 25010000002 HEPARIN (PORCINE) PER 1000 UNITS: Performed by: PHYSICIAN ASSISTANT

## 2023-01-06 PROCEDURE — 25010000002 HEPARIN (PORCINE) 25000-0.45 UT/250ML-% SOLUTION: Performed by: PHYSICIAN ASSISTANT

## 2023-01-06 PROCEDURE — 63710000001 INSULIN LISPRO (HUMAN) PER 5 UNITS: Performed by: INTERNAL MEDICINE

## 2023-01-06 RX ORDER — ACETAMINOPHEN 325 MG/1
650 TABLET ORAL EVERY 4 HOURS PRN
Status: DISCONTINUED | OUTPATIENT
Start: 2023-01-06 | End: 2023-01-10

## 2023-01-06 RX ORDER — LIDOCAINE HYDROCHLORIDE 20 MG/ML
INJECTION, SOLUTION INFILTRATION; PERINEURAL
Status: DISCONTINUED | OUTPATIENT
Start: 2023-01-06 | End: 2023-01-06 | Stop reason: HOSPADM

## 2023-01-06 RX ORDER — FENTANYL CITRATE 50 UG/ML
INJECTION, SOLUTION INTRAMUSCULAR; INTRAVENOUS
Status: DISCONTINUED | OUTPATIENT
Start: 2023-01-06 | End: 2023-01-06 | Stop reason: HOSPADM

## 2023-01-06 RX ORDER — HEPARIN SODIUM 1000 [USP'U]/ML
INJECTION, SOLUTION INTRAVENOUS; SUBCUTANEOUS
Status: DISCONTINUED | OUTPATIENT
Start: 2023-01-06 | End: 2023-01-06 | Stop reason: HOSPADM

## 2023-01-06 RX ORDER — MIDAZOLAM HYDROCHLORIDE 1 MG/ML
INJECTION INTRAMUSCULAR; INTRAVENOUS
Status: DISCONTINUED | OUTPATIENT
Start: 2023-01-06 | End: 2023-01-06 | Stop reason: HOSPADM

## 2023-01-06 RX ORDER — SODIUM CHLORIDE 9 MG/ML
INJECTION, SOLUTION INTRAVENOUS
Status: COMPLETED | OUTPATIENT
Start: 2023-01-06 | End: 2023-01-06

## 2023-01-06 RX ORDER — VERAPAMIL HYDROCHLORIDE 2.5 MG/ML
INJECTION, SOLUTION INTRAVENOUS
Status: DISCONTINUED | OUTPATIENT
Start: 2023-01-06 | End: 2023-01-06 | Stop reason: HOSPADM

## 2023-01-06 RX ADMIN — INSULIN LISPRO 4 UNITS: 100 INJECTION, SOLUTION INTRAVENOUS; SUBCUTANEOUS at 23:01

## 2023-01-06 RX ADMIN — Medication 10 ML: at 21:36

## 2023-01-06 RX ADMIN — HEPARIN SODIUM 13 UNITS/KG/HR: 10000 INJECTION, SOLUTION INTRAVENOUS at 14:31

## 2023-01-06 RX ADMIN — Medication 10 ML: at 08:40

## 2023-01-06 RX ADMIN — HEPARIN SODIUM 4000 UNITS: 5000 INJECTION INTRAVENOUS; SUBCUTANEOUS at 04:37

## 2023-01-06 RX ADMIN — TECHNETIUM TC 99M SESTAMIBI 1 DOSE: 1 INJECTION INTRAVENOUS at 09:50

## 2023-01-06 NOTE — NURSING NOTE
Report given to JOS Laguna 4th floor. Patient being transferred to Cath lab. Cardiology to admit.

## 2023-01-06 NOTE — PROGRESS NOTES
MD ATTESTATION NOTE    The MIKE and I have discussed this patient's history, physical exam, and treatment plan.  I have reviewed the documentation and personally had a face to face interaction with the patient. I affirm the documentation and agree with the treatment and plan.  The attached note describes my personal findings.      I provided a substantive portion of the care of the patient.  I personally performed the physical exam in its entirety, and below are my findings.  For this patient encounter, the patient wore surgical mask, I wore full protective PPE including N95 and eye protection.      Brief HPI: This patient is a 67-year-old male with a known coronary artery disease history admitted to the observation unit  yesterday following an approximate 30-minute episode of chest discomfort.  Throughout the evening, he reports that he has had no further chest pain.  He denies back pain, shortness of breath, abdominal pain, or nausea and vomiting.    PHYSICAL EXAM  ED Triage Vitals [01/05/23 0837]   Temp Heart Rate Resp BP SpO2   98 °F (36.7 °C) 98 20 (!) 169/102 97 %      Temp src Heart Rate Source Patient Position BP Location FiO2 (%)   Tympanic Monitor Lying Right arm --         GENERAL: Resting comfortably and in no acute distress, nontoxic in appearance  HENT: nares patent  EYES: no scleral icterus  CV: regular rhythm, normal rate, no M/R/G  RESPIRATORY: normal effort, lungs clear bilaterally  ABDOMEN: soft, nontender, no rebound or guarding  MUSCULOSKELETAL: no deformity, no edema  NEURO: alert, moves all extremities, follows commands  PSYCH:  calm, cooperative  SKIN: warm, dry    Vital signs and nursing notes reviewed.        Plan: The patient's troponin did trend upward but now has begun to decline.  His EKG remains nonischemic.  Cardiology has seen and examined the patient this morning and will most likely take him for cardiac catheterization.  We will monitor and reassess following.

## 2023-01-06 NOTE — PROGRESS NOTES
ED OBSERVATION PROGRESS/DISCHARGE SUMMARY    Date of Admission: 1/5/2023   LOS: 0 days   PCP: Eric Eubanks MD      Subjective    No acute events overnight.   Patient denies chest pain and discomfort overnight.    Hospital Outcome:   67-year-old male admitted to the observation unit for further evaluation of chest discomfort.  In the ER chest x-ray revealed borderline heart size otherwise negative acute.  Initial troponin was negative and EKG revealed atrial fibrillation with a rate of 93 beats per minutes, minimal ST depression in the anterolateral leads.  Patient's serial troponins trended up 0.143, 0.178, [facility] cardiology was consulted requesting a low-dose heparin drip with initial bolus to be initiated to continue monitor serial troponins keep patient n.p.o. after midnight.  Overnight patient's troponin trended downwards to 0.164 and then to 0.136.  Patient remained asymptomatic overnight.  He denied any chest discomfort this morning.     He was seen and evaluated by Dr. Chun with the cardiology service who initially was planning to take patient for stress testing but after speaking with his primary cardiologist Dr. Mc plan for cardiac catheterization today with Dr Dominguez. I have spoken with Chuyita RAYMUNDO on call for their service and Dr. Chun will assume admitting provider following heart cath today. Patient is agreeable to plan.         ROS:  General: no fevers, chills  Respiratory: no cough, dyspnea  Cardiovascular: no chest pain, palpitations  Abdomen: No abdominal pain, nausea, vomiting, or diarrhea  Neurologic: No focal weakness    Objective   Physical Exam:  I have reviewed the vital signs.  Temp:  [97.8 °F (36.6 °C)-98.9 °F (37.2 °C)] 98.2 °F (36.8 °C)  Heart Rate:  [70-98] 80  Resp:  [17-20] 18  BP: (127-169)/() 137/91  General Appearance:    Alert, cooperative, no distress  Head:    Normocephalic, atraumatic  Eyes:    Sclerae anicteric  Neck:   Supple, no mass  Lungs: Clear  to auscultation bilaterally, respirations unlabored  Heart: Regular rate and rhythm, S1 and S2 normal, no murmur, rub or gallop  Abdomen:  Soft, non-tender, bowel sounds active, nondistended  Extremities: No clubbing, cyanosis, or edema to lower extremities  Pulses:  2+ and symmetric in distal lower extremities  Skin: No rashes   Neurologic: Oriented x3, Normal strength to extremities    Results Review:    I have reviewed the labs, radiology results and diagnostic studies.    Results from last 7 days   Lab Units 01/05/23  0858   WBC 10*3/mm3 6.06   HEMOGLOBIN g/dL 15.7   HEMATOCRIT % 47.0   PLATELETS 10*3/mm3 142     Results from last 7 days   Lab Units 01/05/23  0858   SODIUM mmol/L 133*   POTASSIUM mmol/L 4.2   CHLORIDE mmol/L 101   CO2 mmol/L 23.9   BUN mg/dL 22   CREATININE mg/dL 1.11   CALCIUM mg/dL 9.0   BILIRUBIN mg/dL 0.8   ALK PHOS U/L 100   ALT (SGPT) U/L 20   AST (SGOT) U/L 12   GLUCOSE mg/dL 391*     Imaging Results (Last 24 Hours)     Procedure Component Value Units Date/Time    XR Chest 2 View [[id number]] Collected: 01/05/23 0916     Updated: 01/05/23 0920    Narrative:      XR CHEST 2 VW-     HISTORY: Male who is 67 years-old,  chest pain     TECHNIQUE: Frontal and lateral views of the chest     COMPARISON: 10/30/2017     FINDINGS: The heart size is borderline. Pulmonary vasculature is  unremarkable. No focal pulmonary consolidation, pleural effusion, or  pneumothorax. Right hemidiaphragm remains elevated. No acute osseous  process.       Impression:      No focal pulmonary consolidation. Borderline heart size.  Follow-up as clinical indications persist.     This report was finalized on 1/5/2023 9:17 AM by Dr. Glenn Mcpherson M.D.             I have reviewed the medications.  ---------------------------------------------------------------------------------------------  Assessment & Plan   Assessment/Problem List    Chest pain      Plan:    Chest pain  CAD, history of STEMI  -Troponin less than  "0.010 --0.143--0.178--0.164   -ECG reviewed, no ischemic changes, repeat in a.m.  -Consult cardiology-cath lab today with Dr. Dominguez  -N.p.o. after midnight, IVF  -Nitroglycerin as needed chest pain  -Hold beta-blocker in AM  -Monitor     Atrial fibrillation  -Currently in A. fib, rate controlled  -Patient is on Xarelto for atrial fibrillation but states he has been unable to afford this recently and has been off of it for \"several months\"     Chronic congestive heart failure  -Continue Lasix  -Euvolemic, no exacerbation at time of admission  -Judicial use of IV fluids, we will not hydrate overnight     Diabetes  -non- insulin dependent   -Accu-Checks 3 times daily with meals  -Moderate dose correctional factor with hypoglycemic protocol  -Hold oral diabetic medication     Hypertension/hyperlipidemia  -Chronic conditions  -Continue home medications as prescribed    Disposition: Cath Lab, admitted to cardiology      This note will serve as a transfer note      55 minutes have been spent by [facility] providers in the care of this patient while under observation status.    I have discussed plan of care with patient including advance care plan and/or surrogate decision maker.  Patient advises that his wife Chuyita Wolf will be their primary surrogate decision maker      I wore an face mask, eye protection, and gloves during this patient encounter. Patient also wearing a surgical mask. Hand hygeine performed before and after seeing the patient.      Jesika Gagnon PA-C 01/06/23 04:16 EST            "

## 2023-01-06 NOTE — PLAN OF CARE
Goal Outcome Evaluation:  Plan of Care Reviewed With: patient        Progress: no change  Outcome Evaluation: A/O, no complaints of pain, elevated troponin trending down, heparin drip started, NPO since MN, cardiology following, VSS, will continue to monitor.

## 2023-01-06 NOTE — CONSULTS
Joshua Wolf   67 y.o.  male    LOS: 0 days   Patient Care Team:  Eric Eubanks MD as PCP - General (Family Medicine)  Jamir Hughes MD as Consulting Physician (Nephrology)      Subjective     Chief Complaint:    History of Present Illness:   67-year-old white male patient who been followed by my colleague  was exercising in gym yesterday morning when he felt heart.  Patient went home and laid down.  At that time he had a mild to moderate and dull ache on the left side of the chest without any radiation to arm or neck not associate with shortness of breath or diaphoresis.  Patient called EMS by the time pain was gone however he was brought to the emergency room.  EKG shows atrial fibrillation.  Patient has history of hypertension hyperlipidemia.  He has history of atrial fibrillation and has been on Xarelto.  Patient has been somewhat noncompliant with his medications.  Troponins are mildly elevated.  He had a myocardial infarction on 07/18/2016 when the LAD was 100% occluded.  He had LAD stent by Dr. Clinton.  Prior to the procedure he had ventricular fibrillation and was resuscitated.  RCA had a mid 60 to 70% gnosis.  The stent was a 2.5 and 18 Xience stent.  On 10/30/2017 patient had a myocardial infarction again and ventricular fibrillation.  At the time he was found to have 100% LAD distal to the previous stent.  He underwent stent deployment by .  RCA had a 80% stenosis and the ramus had a 70% stenosis.  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  He lives at home with his wife and adult son.  His echocardiogram in 2017 showed ejection fraction 25 to 30%.  However it has improved to about 50-55% so he had no ICD.        Past Medical History:   Diagnosis Date   • CHF (congestive heart failure) (HCC)    • Coronary artery disease    • Diabetes mellitus (HCC)    • Hyperlipidemia    • Myocardial infarction (HCC)    • Paroxysmal A-fib (HCC)    • Seizures ([facility])      Past Surgical History:    Procedure Laterality Date   • CARDIAC CATHETERIZATION N/A 7/18/2016    Procedure: Left Heart Cath;  Surgeon: Bella Clinton MD;  Location:  [facility] C[facility]ASIVE [facility];  Service:    • CARDIAC CATHETERIZATION N/A 10/30/2017    Procedure: Left Heart Cath;  Surgeon: Zully Gregg MD;  Location:  [facility] CATH [facility]AS[facility];  Service:    • CARDIAC CATHETERIZATION N/A 10/30/2017    Procedure: Coronary angiography;  Surgeon: Zully Gregg MD;  Location:  [facility]AS[facility];  Service:    • CARDIAC CATHETERIZATION N/A 10/30/2017    Procedure: Stent SANIA coronary;  Surgeon: Zully Gregg MD;  Location:  [facility]ATH [facility]AS[facility];  Service:    • MD RT/LT HEART CATHETERS N/A 10/30/2017    Procedure: Percutaneous Coronary Intervention;  Surgeon: Zully Gregg MD;  Location:  [facility]ATH [facility]VAS[facility];  Service: Cardiovascular     Medications Prior to Admission   Medication Sig Dispense Refill Last Dose   • aspirin 81 MG chewable tablet Chew 81 mg Daily.   1/5/2023   • furosemide (LASIX) 40 MG tablet Take 40 mg by mouth daily.   1/5/2023   • glimepiride (AMARYL) 4 MG tablet Take 1 tablet by mouth Every Morning Before Breakfast. 90 tablet 1 1/5/2023   • metFORMIN ER (GLUCOPHAGE-XR) 500 MG 24 hr tablet Take 2 tablets by mouth Daily With Breakfast. 180 tablet 1 1/5/2023   • potassium chloride (K-DUR,KLOR-CON) 20 MEQ CR tablet Take 20 mEq by mouth daily.   Past Month   • spironolactone (ALDACTONE) 25 MG tablet Take 25 mg by mouth Daily.  5 Past Month   • vitamin D (ERGOCALCIFEROL) 1.25 MG (57336 UT) capsule capsule Take 50,000 Units by mouth Every 7 (Seven) Days.   1/5/2023   • atorvastatin (LIPITOR) 80 MG tablet Take 1 tablet by mouth Daily. 30 tablet 11 More than a month   • carvedilol (COREG) 25 MG tablet Take 50 mg by mouth Every Morning.   More than a month   • carvedilol (COREG) 25 MG tablet Take 25 mg by mouth Every Evening.   More than a month   • XARELTO 20 MG tablet Take 20 mg by mouth  Daily With Dinner.   More than a month       Family History   Problem Relation Age of Onset   • Heart disease Mother    • Thyroid cancer Mother    • Stroke Father    • Heart disease Sister    • Diabetes Sister    • Alcohol abuse Other      Social History     Socioeconomic History   • Marital status:    Tobacco Use   • Smoking status: Former     Years: 15.00     Types: Cigarettes     Quit date:      Years since quittin.0   Substance and Sexual Activity   • Alcohol use: Yes     Alcohol/week: 4.0 standard drinks     Types: 4 Glasses of wine per week   • Drug use: No     Objective       Review of Systems:   Constitutional: Negative for diaphoresis, fatigue, fever and unexpected weight change.   HENT: Negative.    Eyes: Negative.    Respiratory: Negative for cough, shortness of breath and wheezing.    Cardiovascular: Negative for chest pain, palpitations and leg swelling.   Gastrointestinal: Negative for abdominal pain, blood in stool, constipation, diarrhea, nausea and vomiting.   Endocrine: Negative.    Genitourinary: Negative for difficulty urinating, dysuria and frequency.   Musculoskeletal: Negative.    Skin: Negative.    Allergic/Immunologic: Negative for environmental allergies and food allergies.   Neurological: Negative.    Hematological: Negative.    Psychiatric/Behavioral: Negative.        Current Facility-Administered Medications:   •  acetaminophen (TYLENOL) tablet 650 mg, 650 mg, Oral, Q4H PRN, Abbie Pina PA  •  aspirin chewable tablet 81 mg, 81 mg, Oral, Daily, Abbie Pina PA  •  aspirin tablet 325 mg, 325 mg, Oral, Once, Agnieszka Mercer MD  •  atorvastatin (LIPITOR) tablet 80 mg, 80 mg, Oral, Daily, Abbie Pina PA, 80 mg at 23 1602  •  carvedilol (COREG) tablet 25 mg, 25 mg, Oral, Q PM, Abbie Pina PA, 25 mg at 23 1602  •  carvedilol (COREG) tablet 50 mg, 50 mg, Oral, QAM, Abbie Pina PA  •  dextrose (D50W) (25 g/50 mL) IV injection 25 g, 25 g,  Intravenous, Q15 Min PRN, Abbie Pina PA  •  dextrose (GLUTOSE) oral gel 15 g, 15 g, Oral, Q15 Min PRN, Abbie Pina PA  •  furosemide (LASIX) tablet 40 mg, 40 mg, Oral, Daily, Abbie Pina PA, 40 mg at 01/05/23 1602  •  glucagon (human recombinant) (GLUCAGEN DIAGNOSTIC) injection 1 mg, 1 mg, Intramuscular, Q15 Min PRN, Abbie Pina PA  •  heparin (porcine) 5000 UNIT/ML injection 3,300-4,000 Units, 30-36 Units/kg, Intravenous, Q6H PRN, Abbie Pina PA, 4,000 Units at 01/06/23 0437  •  heparin 48166 units/250 mL (100 units/mL) in 0.45 % NaCl infusion, 9.01 Units/kg/hr, Intravenous, Titrated, Abbie Pina PA, Last Rate: 16.65 mL/hr at 01/06/23 0920, 15 Units/kg/hr at 01/06/23 0920  •  insulin lispro (ADMELOG) injection 0-9 Units, 0-9 Units, Subcutaneous, TID AC, Abbie Pina PA, 8 Units at 01/05/23 1630  •  melatonin tablet 5 mg, 5 mg, Oral, Nightly PRN, Abbie Pina PA  •  nitroglycerin (NITROSTAT) SL tablet 0.4 mg, 0.4 mg, Sublingual, Q5 Min PRN, Abbie Pina PA  •  ondansetron (ZOFRAN) tablet 4 mg, 4 mg, Oral, Q6H PRN **OR** ondansetron (ZOFRAN) injection 4 mg, 4 mg, Intravenous, Q6H PRN, Abbie Pina PA  •  sodium chloride 0.9 % flush 10 mL, 10 mL, Intravenous, PRN, Agnieszka Mercer MD  •  sodium chloride 0.9 % flush 10 mL, 10 mL, Intravenous, Q12H, Abbie Pina PA, 10 mL at 01/06/23 0840  •  sodium chloride 0.9 % flush 10 mL, 10 mL, Intravenous, PRN, Abbie Pina PA  •  sodium chloride 0.9 % infusion 40 mL, 40 mL, Intravenous, PRN, Abbie Pina PA  •  spironolactone (ALDACTONE) tablet 25 mg, 25 mg, Oral, Daily, Abbie Pina PA, 25 mg at 01/05/23 1602      Physical Exam:   Vital Sign Min/Max for last 24 hours  Temp  Min: 97.8 °F (36.6 °C)  Max: 98.9 °F (37.2 °C)   BP  Min: 127/96  Max: 150/98    Pulse  Min: 70  Max: 95     Wt Readings from Last 3 Encounters:   01/05/23 111 kg (245 lb)   04/12/22 118 kg (260 lb 12.8 oz)   10/14/21 112 kg (246 lb)        General Appearance:  Awake,  Alert, cooperative, in no acute distress   Head:  Normocephalic, without obvious abnormality, atraumatic   Eyes:          Conjunctivae normal, anicteric, eom intact    Neck: No adenopathy, supple, trachea midline, no thyromegaly, no   carotid bruit, no JVD, no elevated cvp   Lungs:   Clear to auscultation,respirations regular, even and                  unlabored    Heart:  Regular rhythm and normal rate, normal S1 and S2,            No murmur, no gallop, no rub, no click    Chest Wall:  No abnormalities observed   Abdomen:   Normal bowel sounds, no masses, soft nontender, nondistended                    Rectal:   Deferred   Extremities: No edema. Moves all extremities well, no cyanosis, no erythema   Pulses: Pulses palpable and equal bilaterally   Skin: No bleeding, bruising or rash   Neurologic: Speech clear and appropriate, no facial drooping     :       MONITOR:    Results Review:     Sodium Sodium   Date Value Ref Range Status   01/06/2023 138 136 - 145 mmol/L Final   01/05/2023 133 (L) 136 - 145 mmol/L Final      Potassium Potassium   Date Value Ref Range Status   01/06/2023 3.7 3.5 - 5.2 mmol/L Final   01/05/2023 4.2 3.5 - 5.2 mmol/L Final     Comment:     Slight hemolysis detected by analyzer. Results may be affected.      Chloride Chloride   Date Value Ref Range Status   01/06/2023 101 98 - 107 mmol/L Final   01/05/2023 101 98 - 107 mmol/L Final      Bicarbonate No results found for: PLASMABICARB   BUN BUN   Date Value Ref Range Status   01/06/2023 21 8 - 23 mg/dL Final   01/05/2023 22 8 - 23 mg/dL Final      Creatinine Creatinine   Date Value Ref Range Status   01/06/2023 1.09 0.76 - 1.27 mg/dL Final   01/05/2023 1.11 0.76 - 1.27 mg/dL Final      Calcium Calcium   Date Value Ref Range Status   01/06/2023 8.8 8.6 - 10.5 mg/dL Final   01/05/2023 9.0 8.6 - 10.5 mg/dL Final      Magnesium No results found for: MG     Results from last 7 days   Lab Units 01/06/23  0552   WBC  10*3/mm3 7.76   HEMOGLOBIN g/dL 16.1   HEMATOCRIT % 46.9   PLATELETS 10*3/mm3 146     Lab Results   Lab Value Date/Time    TROPONINT 0.136 (C) 01/06/2023 0552    TROPONINT 0.164 (C) 01/06/2023 0024    TROPONINT 0.178 (C) 01/05/2023 1746    TROPONINT 0.143 (C) 01/05/2023 1508    TROPONINT <0.010 01/05/2023 0858    TROPONINT <0.010 10/30/2017 0114    TROPONINT <0.010 08/12/2016 0944    TROPONINT 0.246 (C) 07/19/2016 0404    TROPONINT <0.010 07/18/2016 0956     Lab Results   Component Value Date    CHOL 166 10/30/2017    CHOL 191 07/19/2016     Lab Results   Component Value Date    HDL 44 04/12/2022    HDL 39 (L) 04/14/2021    HDL 37 (L) 03/05/2019     Lab Results   Component Value Date    LDL 68 04/12/2022     (H) 04/14/2021    LDL 67 03/05/2019     Lab Results   Component Value Date    TRIG 79 04/12/2022    TRIG 122 04/14/2021    TRIG 129 03/05/2019     No components found for: CHOLHDL  PTT   Date Value Ref Range Status   01/06/2023 39.8 (H) 22.7 - 35.4 seconds Final   01/05/2023 25.0 22.7 - 35.4 seconds Final     No components found for: PT/INR  Lab Results   Component Value Date    HGBA1C 9.3 (H) 04/12/2022      Lab Results   Component Value Date    TSH 3.960 04/14/2021        Echo EF Estimated  )  Lab Results   Component Value Date    ECHOEFEST 35 07/18/2016         Assessment/ Plan    Active Hospital Problems    Diagnosis  POA   • **Chest pain [R07.9]  Yes   Hypertension  Hyperlipidemia  Diabetes  History of previous myocardial infarction's and LAD stents  We will schedule him for a Lexiscan Cardiolite study          Tyson Chun MD  01/06/23  09:21 EST    Discussed with  Time:

## 2023-01-06 NOTE — PROGRESS NOTES
MD ATTESTATION NOTE    The MIKE and I have discussed this patient's history, physical exam, and treatment plan.  I have reviewed the documentation and personally had a face to face interaction with the patient. I affirm the documentation and agree with the treatment and plan.  The attached note describes my personal findings.      I provided a substantive portion of the care of the patient.  I personally performed the physical exam in its entirety, and below are my findings.  For this patient encounter, the patient wore surgical mask, I wore full protective PPE including N95 and eye protection.      Brief HPI: This patient is a 67-year-old male with a history of coronary artery disease admitted to the observation unit today following an approximately 30-minute episode of chest discomfort after going to the gym this morning.  He states that currently the pain has fully resolved.  He denies any associated shortness of breath, nausea/vomiting, back pain, extremity pain, or diaphoresis.  He does report that the pain initially felt similar to his previous cardiac event.    PHYSICAL EXAM  ED Triage Vitals [01/05/23 0837]   Temp Heart Rate Resp BP SpO2   98 °F (36.7 °C) 98 20 (!) 169/102 97 %      Temp src Heart Rate Source Patient Position BP Location FiO2 (%)   Tympanic Monitor Lying Right arm --         GENERAL: Resting comfortably and in no acute distress, nontoxic in appearance  HENT: nares patent  EYES: no scleral icterus  CV: regular rhythm, normal rate, no M/R/G  RESPIRATORY: normal effort, lungs clear bilaterally  ABDOMEN: soft, nontender, no rebound or guarding  MUSCULOSKELETAL: no deformity, no edema  NEURO: alert, moves all extremities, follows commands  PSYCH:  calm, cooperative  SKIN: warm, dry    Vital signs and nursing notes reviewed.        Plan: The patient's EKG is nonischemic but his cardiac enzymes are trending upwards.  We have discussed the case with the patient's cardiologist and they do request  further cardiac enzymes and will see in consultation.

## 2023-01-07 ENCOUNTER — APPOINTMENT (OUTPATIENT)
Dept: CARDIOLOGY | Facility: HOSPITAL | Age: 68
DRG: 234 | End: 2023-01-07
Payer: MEDICARE

## 2023-01-07 ENCOUNTER — APPOINTMENT (OUTPATIENT)
Dept: GENERAL RADIOLOGY | Facility: HOSPITAL | Age: 68
DRG: 234 | End: 2023-01-07
Payer: MEDICARE

## 2023-01-07 PROBLEM — I21.4 NSTEMI (NON-ST ELEVATED MYOCARDIAL INFARCTION): Status: ACTIVE | Noted: 2023-01-05

## 2023-01-07 PROBLEM — R93.1 ABNORMAL FINDINGS ON DIAGNOSTIC IMAGING OF HEART AND CORONARY CIRCULATION: Status: ACTIVE | Noted: 2023-01-05

## 2023-01-07 LAB
ABO GROUP BLD: NORMAL
ANION GAP SERPL CALCULATED.3IONS-SCNC: 12 MMOL/L (ref 5–15)
BASOPHILS # BLD AUTO: 0.05 10*3/MM3 (ref 0–0.2)
BASOPHILS NFR BLD AUTO: 0.7 % (ref 0–1.5)
BH CV XLRA MEAS - DIST GSV CALF DIST LEFT: 0.22 CM
BH CV XLRA MEAS - DIST GSV CALF DIST RIGHT: 0.23 CM
BH CV XLRA MEAS - DIST GSV THIGH DIST LEFT: 0.4 CM
BH CV XLRA MEAS - DIST GSV THIGH DIST RIGHT: 0.31 CM
BH CV XLRA MEAS - DIST LSV CALF DIST LEFT: 0.15 CM
BH CV XLRA MEAS - DIST LSV CALF DIST RIGHT: 0.19 CM
BH CV XLRA MEAS - GSV ANKLE DIST LEFT: 0.3 CM
BH CV XLRA MEAS - GSV ANKLE DIST RIGHT: 0.25 CM
BH CV XLRA MEAS - GSV KNEE DIST LEFT: 0.26 CM
BH CV XLRA MEAS - GSV KNEE DIST RIGHT: 0.29 CM
BH CV XLRA MEAS - GSV ORIGIN DIST LEFT: 0.48 CM
BH CV XLRA MEAS - GSV ORIGIN DIST RIGHT: 0.54 CM
BH CV XLRA MEAS - MID GSV CALF LEFT: 0.2 CM
BH CV XLRA MEAS - MID GSV CALF RIGHT: 0.19 CM
BH CV XLRA MEAS - MID GSV THIGH  LEFT: 0.36 CM
BH CV XLRA MEAS - MID GSV THIGH  RIGHT: 0.29 CM
BH CV XLRA MEAS - MID LSV CALF DIST LEFT: 0.13 CM
BH CV XLRA MEAS - MID LSV CALF DIST RIGHT: 0.19 CM
BH CV XLRA MEAS - PROX GSV CALF DIST LEFT: 0.28 CM
BH CV XLRA MEAS - PROX GSV CALF DIST RIGHT: 0.18 CM
BH CV XLRA MEAS - PROX GSV THIGH  LEFT: 0.31 CM
BH CV XLRA MEAS - PROX GSV THIGH  RIGHT: 0.17 CM
BH CV XLRA MEAS - PROX LSV CALF DIST LEFT: 0.13 CM
BH CV XLRA MEAS - PROX LSV CALF DIST RIGHT: 0.31 CM
BH CV XLRA MEAS LEFT DIST CCA EDV: -15.5 CM/SEC
BH CV XLRA MEAS LEFT DIST CCA PSV: -57.2 CM/SEC
BH CV XLRA MEAS LEFT DIST ICA EDV: -21.2 CM/SEC
BH CV XLRA MEAS LEFT DIST ICA PSV: -48.8 CM/SEC
BH CV XLRA MEAS LEFT ICA/CCA RATIO: 1.31
BH CV XLRA MEAS LEFT MID ICA EDV: -28.5 CM/SEC
BH CV XLRA MEAS LEFT MID ICA PSV: -75.2 CM/SEC
BH CV XLRA MEAS LEFT PROX CCA EDV: 13.7 CM/SEC
BH CV XLRA MEAS LEFT PROX CCA PSV: 72.1 CM/SEC
BH CV XLRA MEAS LEFT PROX ECA EDV: -13 CM/SEC
BH CV XLRA MEAS LEFT PROX ECA PSV: -82.6 CM/SEC
BH CV XLRA MEAS LEFT PROX ICA EDV: -22.6 CM/SEC
BH CV XLRA MEAS LEFT PROX ICA PSV: -48.2 CM/SEC
BH CV XLRA MEAS LEFT PROX SCLA PSV: 137.8 CM/SEC
BH CV XLRA MEAS LEFT VERTEBRAL A EDV: 13.6 CM/SEC
BH CV XLRA MEAS LEFT VERTEBRAL A PSV: 43.5 CM/SEC
BH CV XLRA MEAS RIGHT DIST CCA EDV: -15.2 CM/SEC
BH CV XLRA MEAS RIGHT DIST CCA PSV: -52.1 CM/SEC
BH CV XLRA MEAS RIGHT DIST ICA EDV: -29.6 CM/SEC
BH CV XLRA MEAS RIGHT DIST ICA PSV: -82.9 CM/SEC
BH CV XLRA MEAS RIGHT ICA/CCA RATIO: 1.59
BH CV XLRA MEAS RIGHT MID ICA EDV: 24.1 CM/SEC
BH CV XLRA MEAS RIGHT MID ICA PSV: 70.2 CM/SEC
BH CV XLRA MEAS RIGHT PROX CCA EDV: 19.7 CM/SEC
BH CV XLRA MEAS RIGHT PROX CCA PSV: 67.3 CM/SEC
BH CV XLRA MEAS RIGHT PROX ECA EDV: -15.4 CM/SEC
BH CV XLRA MEAS RIGHT PROX ECA PSV: -89.5 CM/SEC
BH CV XLRA MEAS RIGHT PROX ICA EDV: -16.5 CM/SEC
BH CV XLRA MEAS RIGHT PROX ICA PSV: -39 CM/SEC
BH CV XLRA MEAS RIGHT PROX SCLA PSV: 80.7 CM/SEC
BH CV XLRA MEAS RIGHT VERTEBRAL A EDV: 7.9 CM/SEC
BH CV XLRA MEAS RIGHT VERTEBRAL A PSV: 34.4 CM/SEC
BLD GP AB SCN SERPL QL: NEGATIVE
BUN SERPL-MCNC: 22 MG/DL (ref 8–23)
BUN/CREAT SERPL: 18.6 (ref 7–25)
CALCIUM SPEC-SCNC: 9.2 MG/DL (ref 8.6–10.5)
CHLORIDE SERPL-SCNC: 97 MMOL/L (ref 98–107)
CHOLEST SERPL-MCNC: 189 MG/DL (ref 0–200)
CLOSE TME COLL+ADP + EPINEP PNL BLD: 95 % (ref 86–100)
CO2 SERPL-SCNC: 25 MMOL/L (ref 22–29)
CREAT SERPL-MCNC: 1.18 MG/DL (ref 0.76–1.27)
DEPRECATED RDW RBC AUTO: 39.4 FL (ref 37–54)
EGFRCR SERPLBLD CKD-EPI 2021: 68.5 ML/MIN/1.73
EOSINOPHIL # BLD AUTO: 0.22 10*3/MM3 (ref 0–0.4)
EOSINOPHIL NFR BLD AUTO: 3.2 % (ref 0.3–6.2)
ERYTHROCYTE [DISTWIDTH] IN BLOOD BY AUTOMATED COUNT: 12.2 % (ref 12.3–15.4)
GLUCOSE BLDC GLUCOMTR-MCNC: 227 MG/DL (ref 70–130)
GLUCOSE BLDC GLUCOMTR-MCNC: 285 MG/DL (ref 70–130)
GLUCOSE BLDC GLUCOMTR-MCNC: 286 MG/DL (ref 70–130)
GLUCOSE BLDC GLUCOMTR-MCNC: 360 MG/DL (ref 70–130)
GLUCOSE SERPL-MCNC: 340 MG/DL (ref 65–99)
HBA1C MFR BLD: 12.4 % (ref 4.8–5.6)
HCT VFR BLD AUTO: 48.8 % (ref 37.5–51)
HDLC SERPL-MCNC: 37 MG/DL (ref 40–60)
HGB BLD-MCNC: 16.7 G/DL (ref 13–17.7)
LDLC SERPL CALC-MCNC: 118 MG/DL (ref 0–100)
LDLC/HDLC SERPL: 3.07 {RATIO}
LEFT ARM BP: NORMAL MMHG
LYMPHOCYTES # BLD AUTO: 1.36 10*3/MM3 (ref 0.7–3.1)
LYMPHOCYTES NFR BLD AUTO: 19.9 % (ref 19.6–45.3)
MAXIMAL PREDICTED HEART RATE: 155 BPM
MAXIMAL PREDICTED HEART RATE: 155 BPM
MCH RBC QN AUTO: 30.4 PG (ref 26.6–33)
MCHC RBC AUTO-ENTMCNC: 34.2 G/DL (ref 31.5–35.7)
MCV RBC AUTO: 88.7 FL (ref 79–97)
MONOCYTES # BLD AUTO: 0.69 10*3/MM3 (ref 0.1–0.9)
MONOCYTES NFR BLD AUTO: 10.1 % (ref 5–12)
NEUTROPHILS NFR BLD AUTO: 4.5 10*3/MM3 (ref 1.7–7)
NEUTROPHILS NFR BLD AUTO: 65.7 % (ref 42.7–76)
PLAT MORPH BLD: NORMAL
PLATELET # BLD AUTO: 132 10*3/MM3 (ref 140–450)
PMV BLD AUTO: 11.1 FL (ref 6–12)
POTASSIUM SERPL-SCNC: 4.2 MMOL/L (ref 3.5–5.2)
QT INTERVAL: 424 MS
RBC # BLD AUTO: 5.5 10*6/MM3 (ref 4.14–5.8)
RBC MORPH BLD: NORMAL
RH BLD: POSITIVE
RIGHT ARM BP: NORMAL MMHG
SODIUM SERPL-SCNC: 134 MMOL/L (ref 136–145)
STRESS TARGET HR: 132 BPM
STRESS TARGET HR: 132 BPM
T&S EXPIRATION DATE: NORMAL
TRIGL SERPL-MCNC: 192 MG/DL (ref 0–150)
VLDLC SERPL-MCNC: 34 MG/DL (ref 5–40)
WBC MORPH BLD: NORMAL
WBC NRBC COR # BLD: 6.85 10*3/MM3 (ref 3.4–10.8)

## 2023-01-07 PROCEDURE — 63710000001 INSULIN LISPRO (HUMAN) PER 5 UNITS

## 2023-01-07 PROCEDURE — 93005 ELECTROCARDIOGRAM TRACING: CPT | Performed by: INTERNAL MEDICINE

## 2023-01-07 PROCEDURE — 93010 ELECTROCARDIOGRAM REPORT: CPT | Performed by: INTERNAL MEDICINE

## 2023-01-07 PROCEDURE — 93880 EXTRACRANIAL BILAT STUDY: CPT

## 2023-01-07 PROCEDURE — 71046 X-RAY EXAM CHEST 2 VIEWS: CPT

## 2023-01-07 PROCEDURE — 86900 BLOOD TYPING SEROLOGIC ABO: CPT | Performed by: THORACIC SURGERY (CARDIOTHORACIC VASCULAR SURGERY)

## 2023-01-07 PROCEDURE — 86901 BLOOD TYPING SEROLOGIC RH(D): CPT

## 2023-01-07 PROCEDURE — 80061 LIPID PANEL: CPT | Performed by: INTERNAL MEDICINE

## 2023-01-07 PROCEDURE — 63710000001 INSULIN LISPRO (HUMAN) PER 5 UNITS: Performed by: INTERNAL MEDICINE

## 2023-01-07 PROCEDURE — 85025 COMPLETE CBC W/AUTO DIFF WBC: CPT | Performed by: INTERNAL MEDICINE

## 2023-01-07 PROCEDURE — 86923 COMPATIBILITY TEST ELECTRIC: CPT

## 2023-01-07 PROCEDURE — 85576 BLOOD PLATELET AGGREGATION: CPT | Performed by: THORACIC SURGERY (CARDIOTHORACIC VASCULAR SURGERY)

## 2023-01-07 PROCEDURE — 86900 BLOOD TYPING SEROLOGIC ABO: CPT

## 2023-01-07 PROCEDURE — 82962 GLUCOSE BLOOD TEST: CPT

## 2023-01-07 PROCEDURE — 93970 EXTREMITY STUDY: CPT

## 2023-01-07 PROCEDURE — 83036 HEMOGLOBIN GLYCOSYLATED A1C: CPT | Performed by: INTERNAL MEDICINE

## 2023-01-07 PROCEDURE — 80048 BASIC METABOLIC PNL TOTAL CA: CPT | Performed by: INTERNAL MEDICINE

## 2023-01-07 PROCEDURE — 86901 BLOOD TYPING SEROLOGIC RH(D): CPT | Performed by: THORACIC SURGERY (CARDIOTHORACIC VASCULAR SURGERY)

## 2023-01-07 PROCEDURE — 86850 RBC ANTIBODY SCREEN: CPT | Performed by: THORACIC SURGERY (CARDIOTHORACIC VASCULAR SURGERY)

## 2023-01-07 PROCEDURE — 85007 BL SMEAR W/DIFF WBC COUNT: CPT | Performed by: INTERNAL MEDICINE

## 2023-01-07 RX ORDER — INSULIN LISPRO 100 [IU]/ML
0-20 INJECTION, SOLUTION INTRAVENOUS; SUBCUTANEOUS
Status: DISCONTINUED | OUTPATIENT
Start: 2023-01-07 | End: 2023-01-10

## 2023-01-07 RX ORDER — POTASSIUM CHLORIDE 750 MG/1
40 TABLET, FILM COATED, EXTENDED RELEASE ORAL AS NEEDED
Status: DISCONTINUED | OUTPATIENT
Start: 2023-01-07 | End: 2023-01-10

## 2023-01-07 RX ORDER — INSULIN LISPRO 100 [IU]/ML
0-9 INJECTION, SOLUTION INTRAVENOUS; SUBCUTANEOUS
Status: DISCONTINUED | OUTPATIENT
Start: 2023-01-07 | End: 2023-01-07

## 2023-01-07 RX ORDER — CHLORHEXIDINE GLUCONATE 0.12 MG/ML
15 RINSE ORAL ONCE
Status: DISCONTINUED | OUTPATIENT
Start: 2023-01-08 | End: 2023-01-09

## 2023-01-07 RX ORDER — NITROGLYCERIN 0.4 MG/1
0.4 TABLET SUBLINGUAL
Status: DISCONTINUED | OUTPATIENT
Start: 2023-01-07 | End: 2023-01-10

## 2023-01-07 RX ORDER — POTASSIUM CHLORIDE 7.45 MG/ML
10 INJECTION INTRAVENOUS
Status: DISCONTINUED | OUTPATIENT
Start: 2023-01-07 | End: 2023-01-10

## 2023-01-07 RX ORDER — CEFAZOLIN SODIUM 2 G/100ML
2 INJECTION, SOLUTION INTRAVENOUS ONCE
Status: DISCONTINUED | OUTPATIENT
Start: 2023-01-09 | End: 2023-01-09

## 2023-01-07 RX ORDER — POTASSIUM CHLORIDE 1.5 G/1.77G
40 POWDER, FOR SOLUTION ORAL AS NEEDED
Status: DISCONTINUED | OUTPATIENT
Start: 2023-01-07 | End: 2023-01-10

## 2023-01-07 RX ORDER — CHLORHEXIDINE GLUCONATE 500 MG/1
1 CLOTH TOPICAL EVERY 12 HOURS PRN
Status: DISCONTINUED | OUTPATIENT
Start: 2023-01-08 | End: 2023-01-10

## 2023-01-07 RX ADMIN — ATORVASTATIN CALCIUM 80 MG: 80 TABLET, FILM COATED ORAL at 08:57

## 2023-01-07 RX ADMIN — INSULIN GLARGINE-YFGN 30 UNITS: 100 INJECTION, SOLUTION SUBCUTANEOUS at 22:00

## 2023-01-07 RX ADMIN — Medication 10 ML: at 08:57

## 2023-01-07 RX ADMIN — INSULIN LISPRO 12 UNITS: 100 INJECTION, SOLUTION INTRAVENOUS; SUBCUTANEOUS at 17:20

## 2023-01-07 RX ADMIN — ASPIRIN 81 MG: 81 TABLET, CHEWABLE ORAL at 08:57

## 2023-01-07 RX ADMIN — INSULIN LISPRO 4 UNITS: 100 INJECTION, SOLUTION INTRAVENOUS; SUBCUTANEOUS at 08:58

## 2023-01-07 RX ADMIN — CARVEDILOL 25 MG: 25 TABLET, FILM COATED ORAL at 22:35

## 2023-01-07 RX ADMIN — Medication 10 ML: at 22:00

## 2023-01-07 RX ADMIN — FUROSEMIDE 40 MG: 40 TABLET ORAL at 08:57

## 2023-01-07 RX ADMIN — INSULIN LISPRO 12 UNITS: 100 INJECTION, SOLUTION INTRAVENOUS; SUBCUTANEOUS at 22:00

## 2023-01-07 RX ADMIN — SPIRONOLACTONE 25 MG: 25 TABLET, FILM COATED ORAL at 08:57

## 2023-01-07 RX ADMIN — CARVEDILOL 50 MG: 25 TABLET, FILM COATED ORAL at 15:12

## 2023-01-07 RX ADMIN — INSULIN LISPRO 8 UNITS: 100 INJECTION, SOLUTION INTRAVENOUS; SUBCUTANEOUS at 12:55

## 2023-01-07 NOTE — PLAN OF CARE
Goal Outcome Evaluation:  Plan of Care Reviewed With: patient        Progress: improving  Outcome Evaluation: Pt has had no c/o pain or discomfort this shift. Pt came from Cath lab with notch release compression device. Pt was given water and turkey sandwich. Vitals and assessment of right radial site wnl.  Was able to remove notch release compression little at a time with no issues and take off and apply tegaderm with gauze. Last vitals at 4am wnl. CTM, safety maintained.

## 2023-01-07 NOTE — PROCEDURES
:   Bernardo Dominguez MD    Procedures performed:  1.  Left heart catheterization.  2.  Left ventriculography    3.  Selective native coronary angiography  4. RFR of LAD    Indications: Unstable angina      Description of the procedure:  Patient was brought to the cardiac catheter was explained the risk and benefit and alternatives of the procedure. Patient signed informed consent, was prepped and draped in sterile fashion for right radial artery access.  Using micropuncture needle and modified Seldinger technique a 5/6 [place] sheath was advanced into the right radial artery.  JL3.5 and JR4 catheter was used to engage the left and right coronary artery respectively.  A JR4 catheter was used to cross the aortic valve and perform a left heart catheterization and LV gram. XBLAD 3.5 guide used for RFR assessment of LAD. Heparin used for anticoagulation.  Moderate sedation duration 40 minutes.     All exchanges were done over the wire.    Hemostasis was achieved by manual compression / TR band.    No complications noted.    Findings:  1.  Left heart catheterization: LVEDP 10 mmHg . No gradient across AV valve.     2.  Left ventriculography:  LV function 55-60%  LVEF normal .     3.  Selective native coronary angiography:  A. Left main bifurcates into LAD and left circumflex.  Distal left main with 10% stenosis.  B. LAD: Large caliber vessel.  Has stents in the proximal segment.  Ostial LAD has 20% stenosis followed by proximal segment with 20 to 30% stenosis.  Mid LAD has 70% stenosis.  Distal LAD has 70% stenosis.  LAD gives rise to 1 prominent diagonal branch with luminal irregularities up to 20-30%.  C. Left circumflex: Large caliber vessel.  Gives rise to a high obtuse marginal 1 branch with mid 80% stenosis followed by a small caliber vessel OM 2 with ostial 70% stenosis.  Large caliber OM 3 distally is occluded subtotally.  D. Right coronary artery: Large-caliber dominant vessel with mid to distal junction has  80% stenosis.    4.  RFR of LAD 0.82 /hemodynamically significant.    Conclusion:  Multivessel coronary artery disease.  Normal LVEDP and LV function.    Recommendations:  Given multivessel coronary artery disease with background of diabetes, preserved LV function patient would benefit from coronary artery bypass grafting evaluation.  Aggressive risk factor modification.      MARCIN PrestonD

## 2023-01-07 NOTE — NURSING NOTE
Pt came from Cath lab with orders from Dr. Dominguez however attending MD was ER on Chart. Called [facility] and spoke to BREANNE Crowell and explained that Dr Mc had been consulted and this RN was told Dr Mc was suppose to be the attending MD for this patient. BREANNE Crowell stated that it was fine to switch ER MD to Dr Mc attending MD.

## 2023-01-08 ENCOUNTER — APPOINTMENT (OUTPATIENT)
Dept: CARDIOLOGY | Facility: HOSPITAL | Age: 68
DRG: 234 | End: 2023-01-08
Payer: MEDICARE

## 2023-01-08 LAB
ANION GAP SERPL CALCULATED.3IONS-SCNC: 13.7 MMOL/L (ref 5–15)
BASOPHILS # BLD AUTO: 0.03 10*3/MM3 (ref 0–0.2)
BASOPHILS NFR BLD AUTO: 0.3 % (ref 0–1.5)
BUN SERPL-MCNC: 22 MG/DL (ref 8–23)
BUN/CREAT SERPL: 18.2 (ref 7–25)
CALCIUM SPEC-SCNC: 8.8 MG/DL (ref 8.6–10.5)
CHLORIDE SERPL-SCNC: 102 MMOL/L (ref 98–107)
CO2 SERPL-SCNC: 22.3 MMOL/L (ref 22–29)
CREAT SERPL-MCNC: 1.21 MG/DL (ref 0.76–1.27)
DEPRECATED RDW RBC AUTO: 41.1 FL (ref 37–54)
EGFRCR SERPLBLD CKD-EPI 2021: 66.4 ML/MIN/1.73
EOSINOPHIL # BLD AUTO: 0.21 10*3/MM3 (ref 0–0.4)
EOSINOPHIL NFR BLD AUTO: 2.3 % (ref 0.3–6.2)
ERYTHROCYTE [DISTWIDTH] IN BLOOD BY AUTOMATED COUNT: 12.6 % (ref 12.3–15.4)
GLUCOSE BLDC GLUCOMTR-MCNC: 129 MG/DL (ref 70–130)
GLUCOSE BLDC GLUCOMTR-MCNC: 158 MG/DL (ref 70–130)
GLUCOSE BLDC GLUCOMTR-MCNC: 216 MG/DL (ref 70–130)
GLUCOSE BLDC GLUCOMTR-MCNC: 299 MG/DL (ref 70–130)
GLUCOSE SERPL-MCNC: 120 MG/DL (ref 65–99)
HCT VFR BLD AUTO: 47.4 % (ref 37.5–51)
HGB BLD-MCNC: 16.2 G/DL (ref 13–17.7)
LYMPHOCYTES # BLD AUTO: 1.63 10*3/MM3 (ref 0.7–3.1)
LYMPHOCYTES NFR BLD AUTO: 18.1 % (ref 19.6–45.3)
MCH RBC QN AUTO: 30.1 PG (ref 26.6–33)
MCHC RBC AUTO-ENTMCNC: 34.2 G/DL (ref 31.5–35.7)
MCV RBC AUTO: 88.1 FL (ref 79–97)
MONOCYTES # BLD AUTO: 0.82 10*3/MM3 (ref 0.1–0.9)
MONOCYTES NFR BLD AUTO: 9.1 % (ref 5–12)
NEUTROPHILS NFR BLD AUTO: 6.29 10*3/MM3 (ref 1.7–7)
NEUTROPHILS NFR BLD AUTO: 69.9 % (ref 42.7–76)
PLATELET # BLD AUTO: 153 10*3/MM3 (ref 140–450)
PMV BLD AUTO: 10.3 FL (ref 6–12)
POTASSIUM SERPL-SCNC: 3.4 MMOL/L (ref 3.5–5.2)
RBC # BLD AUTO: 5.38 10*6/MM3 (ref 4.14–5.8)
SODIUM SERPL-SCNC: 138 MMOL/L (ref 136–145)
WBC NRBC COR # BLD: 9.01 10*3/MM3 (ref 3.4–10.8)

## 2023-01-08 PROCEDURE — 63710000001 INSULIN LISPRO (HUMAN) PER 5 UNITS

## 2023-01-08 PROCEDURE — 82962 GLUCOSE BLOOD TEST: CPT

## 2023-01-08 PROCEDURE — 85025 COMPLETE CBC W/AUTO DIFF WBC: CPT | Performed by: INTERNAL MEDICINE

## 2023-01-08 PROCEDURE — 80048 BASIC METABOLIC PNL TOTAL CA: CPT | Performed by: THORACIC SURGERY (CARDIOTHORACIC VASCULAR SURGERY)

## 2023-01-08 PROCEDURE — 93306 TTE W/DOPPLER COMPLETE: CPT

## 2023-01-08 RX ORDER — INSULIN LISPRO 100 [IU]/ML
10 INJECTION, SOLUTION INTRAVENOUS; SUBCUTANEOUS
Status: DISCONTINUED | OUTPATIENT
Start: 2023-01-08 | End: 2023-01-10

## 2023-01-08 RX ADMIN — Medication 10 ML: at 20:27

## 2023-01-08 RX ADMIN — FUROSEMIDE 40 MG: 40 TABLET ORAL at 08:47

## 2023-01-08 RX ADMIN — CARVEDILOL 50 MG: 25 TABLET, FILM COATED ORAL at 08:47

## 2023-01-08 RX ADMIN — INSULIN LISPRO 12 UNITS: 100 INJECTION, SOLUTION INTRAVENOUS; SUBCUTANEOUS at 11:35

## 2023-01-08 RX ADMIN — SPIRONOLACTONE 25 MG: 25 TABLET, FILM COATED ORAL at 08:47

## 2023-01-08 RX ADMIN — INSULIN LISPRO 10 UNITS: 100 INJECTION, SOLUTION INTRAVENOUS; SUBCUTANEOUS at 15:57

## 2023-01-08 RX ADMIN — POTASSIUM CHLORIDE 40 MEQ: 750 TABLET, EXTENDED RELEASE ORAL at 07:04

## 2023-01-08 RX ADMIN — INSULIN GLARGINE-YFGN 50 UNITS: 100 INJECTION, SOLUTION SUBCUTANEOUS at 20:27

## 2023-01-08 RX ADMIN — CARVEDILOL 25 MG: 25 TABLET, FILM COATED ORAL at 15:59

## 2023-01-08 RX ADMIN — INSULIN LISPRO 4 UNITS: 100 INJECTION, SOLUTION INTRAVENOUS; SUBCUTANEOUS at 07:04

## 2023-01-08 RX ADMIN — INSULIN LISPRO 8 UNITS: 100 INJECTION, SOLUTION INTRAVENOUS; SUBCUTANEOUS at 15:57

## 2023-01-08 RX ADMIN — ATORVASTATIN CALCIUM 80 MG: 80 TABLET, FILM COATED ORAL at 08:47

## 2023-01-08 RX ADMIN — Medication 10 ML: at 09:17

## 2023-01-08 RX ADMIN — ASPIRIN 81 MG: 81 TABLET, CHEWABLE ORAL at 08:47

## 2023-01-08 NOTE — PROGRESS NOTES
Cardiology/Electrophysiology Progress Note      Patient Name: Joshua Wolf  Age/Sex: 67 y.o. male  : 1955  MRN: [id number]      Day of Service: 23       Chief Complaint/Follow-up:     Interval History:     S: Patient seen and examined. Tele Reviewed.      No acute issues. Talked to Dr. Campos - moving surgery to Tuesday.      Temp:  [98.1 °F (36.7 °C)-98.6 °F (37 °C)] 98.2 °F (36.8 °C)  Heart Rate:  [83-89] 83  Resp:  [18] 18  BP: (123-139)/() 129/88     AO x 3, NAD  Eyes PERRL, EOMI  Neck supple, no JVD  CTA  RRR  Abdomen soft, obese, NT/ND. BS intact  No edema, CC  Skin warm and dry        ECG/TELE: Reviewed.       Results from last 7 days   Lab Units 23  0347 23  1227 23  0552   SODIUM mmol/L 138 134* 138   POTASSIUM mmol/L 3.4* 4.2 3.7   CHLORIDE mmol/L 102 97* 101   CO2 mmol/L 22.3 25.0 23.4   BUN mg/dL 22 22 21   CREATININE mg/dL 1.21 1.18 1.09   GLUCOSE mg/dL 120* 340* 269*   CALCIUM mg/dL 8.8 9.2 8.8     Results from last 7 days   Lab Units 23  0347 23  0438 23  0552   WBC 10*3/mm3 9.01 6.85 7.76   HEMOGLOBIN g/dL 16.2 16.7 16.1   HEMATOCRIT % 47.4 48.8 46.9   PLATELETS 10*3/mm3 153 132* 146     Results from last 7 days   Lab Units 23  1907   INR  1.04       Current Medications:   Scheduled Meds:aspirin, 81 mg, Oral, Daily  atorvastatin, 80 mg, Oral, Daily  carvedilol, 25 mg, Oral, Q PM  carvedilol, 50 mg, Oral, QAM  [START ON 2023] ceFAZolin, 2 g, Intravenous, Once  chlorhexidine, 15 mL, Mouth/Throat, Once  furosemide, 40 mg, Oral, Daily  insulin glargine, 30 Units, Subcutaneous, Nightly  insulin lispro, 0-20 Units, Subcutaneous, 4x Daily With Meals & Nightly  metoprolol tartrate, 12.5 mg, Oral, On Call to OR  mupirocin, 1 application, Each Nare, Q12H  sodium chloride, 10 mL, Intravenous, Q12H  spironolactone, 25 mg, Oral, Daily      Continuous Infusions:         Chest pain    Chest pain, unspecified type    NSTEMI (non-ST elevated  myocardial infarction) (HCC)    Abnormal findings on diagnostic imaging of heart and coronary circulation       A/P: 66 yo male with Known CAD with CP.     1. CP - unstable angina - in patient with previous PCI of LAD x 2 - repeat LHC showed diffuse disease distal to previous stents in the LAD, LCx has 80% OM1 and 70% OM2, with occlusion of OM3 distally, RCA with mid diffuse 80% disease. EF normal. Plan for surgical revascularization - patient does not want to proceed on Monday and is awaiting speaking to the surgeon - talked Today - apparently, moving surgery to Tuesday.     2. HTN - controlled.     3. HLD - on Statin, treat to goal.     4. Atrial Fibrillation - paroxysmal, unclear how symptomatic - elevated [id number] score of at least 4 - should be on life long AC - not on AC as awaiting surgery - would recommend Amiodarone infusion around the time of surgery or in the postoperative setting as I surmise he will likely have post operative Atrial Fibrillation.        Moshe Obrien DO  01/08/23  10:00 EST

## 2023-01-08 NOTE — PROGRESS NOTES
Cardiology/Electrophysiology Progress Note      Patient Name: Joshua Wolf  Age/Sex: 67 y.o. male  : 1955  MRN: [id number]      Day of Service: 23       Chief Complaint/Follow-up:     Interval History:     S: Patient seen and examined. Tele Reviewed.     No acute issues.      Temp:  [97.4 °F (36.3 °C)-98.6 °F (37 °C)] 98.6 °F (37 °C)  Heart Rate:  [66-89] 89  Resp:  [18] 18  BP: (126-147)/() 126/101     AO x 3, NAD  Eyes PERRL, EOMI  Neck supple, no JVD  CTA  RRR  BS intact  No edema, CC  Skin warm and dry      ECG/TELE: Reviewed.      Results from last 7 days   Lab Units 23  1227 23  0552 23  0858   SODIUM mmol/L 134* 138 133*   POTASSIUM mmol/L 4.2 3.7 4.2   CHLORIDE mmol/L 97* 101 101   CO2 mmol/L 25.0 23.4 23.9   BUN mg/dL 22 21 22   CREATININE mg/dL 1.18 1.09 1.11   GLUCOSE mg/dL 340* 269* 391*   CALCIUM mg/dL 9.2 8.8 9.0     Results from last 7 days   Lab Units 23  0438 23  0552 23  0858   WBC 10*3/mm3 6.85 7.76 6.06   HEMOGLOBIN g/dL 16.7 16.1 15.7   HEMATOCRIT % 48.8 46.9 47.0   PLATELETS 10*3/mm3 132* 146 142     Results from last 7 days   Lab Units 23  1907   INR  1.04       Current Medications:   Scheduled Meds:aspirin, 81 mg, Oral, Daily  atorvastatin, 80 mg, Oral, Daily  carvedilol, 25 mg, Oral, Q PM  carvedilol, 50 mg, Oral, QAM  [START ON 2023] ceFAZolin, 2 g, Intravenous, Once  [START ON 2023] chlorhexidine, 15 mL, Mouth/Throat, Once  furosemide, 40 mg, Oral, Daily  insulin glargine, 30 Units, Subcutaneous, Nightly  insulin lispro, 0-20 Units, Subcutaneous, 4x Daily With Meals & Nightly  [START ON 2023] metoprolol tartrate, 12.5 mg, Oral, On Call to OR  [START ON 2023] mupirocin, 1 application, Each Nare, Q12H  sodium chloride, 10 mL, Intravenous, Q12H  spironolactone, 25 mg, Oral, Daily      Continuous Infusions:         Chest pain    Chest pain, unspecified type    NSTEMI (non-ST elevated myocardial infarction) (HCC)     Abnormal findings on diagnostic imaging of heart and coronary circulation       A/P: 66 yo male with Known CAD with CP.    1. CP - unstable angina - in patient with previous PCI of LAD x 2 - repeat LHC showed diffuse disease distal to previous stents in the LAD, LCx has 80% OM1 and 70% OM2, with occlusion of OM3 distally, RCA with mid diffuse 80% disease. EF normal. Plan for surgical revascularization - patient does not want to proceed on Monday and is awaiting speaking to the surgeon.    2. HTN - controlled.    3. HLD - on Statin, treat to goal.    4. Atrial Fibrillation - paroxysmal, unclear how symptomatic - elevated [id number] score of at least 4 - should be on life long AC - not on AC as awaiting surgery - would recommend Amiodarone infusion around the time of surgery or in the postoperative setting as I surmise he will likely have post operative Atrial Fibrillation.        Moshe Obrien DO  01/07/23  20:47 EST

## 2023-01-08 NOTE — PLAN OF CARE
Goal Outcome Evaluation:  Plan of Care Reviewed With: patient           Outcome Evaluation: VSS, AOx4. no chest pain or new complaints. Surgery postponed to Tuesday. WCMANASA.

## 2023-01-08 NOTE — PLAN OF CARE
Goal Outcome Evaluation:  Plan of Care Reviewed With: patient        Progress: no change   .Diuretic in Use: none  Response to Diuretics (Output greater than intake): greater  Daily Weight (up or down): pending documentation  O2 Requirements: RA  Functional Status (Activity level, tolerance and respiratory symptoms): ad jacob  Discharge Plans:  pt to have cabg 1/9 with maritza. Pt waiting for CT surgery to speak to him before he agrees to surgery and signs the consent.

## 2023-01-08 NOTE — PROGRESS NOTES
The patient wants to wait till Tuesday for surgery due to some things he wants to do.  We will cancel preop orders and make the transfer.

## 2023-01-09 ENCOUNTER — ANESTHESIA EVENT (OUTPATIENT)
Dept: PERIOP | Facility: HOSPITAL | Age: 68
DRG: 234 | End: 2023-01-09
Payer: MEDICARE

## 2023-01-09 ENCOUNTER — APPOINTMENT (OUTPATIENT)
Dept: RESPIRATORY THERAPY | Facility: HOSPITAL | Age: 68
DRG: 234 | End: 2023-01-09
Payer: MEDICARE

## 2023-01-09 ENCOUNTER — APPOINTMENT (OUTPATIENT)
Dept: CT IMAGING | Facility: HOSPITAL | Age: 68
DRG: 234 | End: 2023-01-09
Payer: MEDICARE

## 2023-01-09 LAB
AORTIC DIMENSIONLESS INDEX: 0.8 (DI)
ARTERIAL PATENCY WRIST A: ABNORMAL
ATMOSPHERIC PRESS: 758.1 MMHG
BASE EXCESS BLDA CALC-SCNC: -0.3 MMOL/L (ref 0–2)
BASOPHILS # BLD AUTO: 0.03 10*3/MM3 (ref 0–0.2)
BASOPHILS NFR BLD AUTO: 0.3 % (ref 0–1.5)
BDY SITE: ABNORMAL
BH CV ECHO MEAS - AI P1/2T: 553 MSEC
BH CV ECHO MEAS - AO MAX PG: 3.9 MMHG
BH CV ECHO MEAS - AO MEAN PG: 2.19 MMHG
BH CV ECHO MEAS - AO ROOT DIAM: 4.2 CM
BH CV ECHO MEAS - AO V2 MAX: 98.9 CM/SEC
BH CV ECHO MEAS - AO V2 VTI: 20.1 CM
BH CV ECHO MEAS - AVA(I,D): 3.4 CM2
BH CV ECHO MEAS - EDV(CUBED): 134.5 ML
BH CV ECHO MEAS - EDV(MOD-SP2): 80 ML
BH CV ECHO MEAS - EDV(MOD-SP4): 72 ML
BH CV ECHO MEAS - EF(MOD-BP): 53.2 %
BH CV ECHO MEAS - EF(MOD-SP2): 55 %
BH CV ECHO MEAS - EF(MOD-SP4): 54.2 %
BH CV ECHO MEAS - ESV(CUBED): 28.8 ML
BH CV ECHO MEAS - ESV(MOD-SP2): 36 ML
BH CV ECHO MEAS - ESV(MOD-SP4): 33 ML
BH CV ECHO MEAS - FS: 40.2 %
BH CV ECHO MEAS - IVS/LVPW: 1.05 CM
BH CV ECHO MEAS - IVSD: 1.26 CM
BH CV ECHO MEAS - LAT PEAK E' VEL: 11.7 CM/SEC
BH CV ECHO MEAS - LV DIASTOLIC VOL/BSA (35-75): 32 CM2
BH CV ECHO MEAS - LV MASS(C)D: 251.1 GRAMS
BH CV ECHO MEAS - LV MAX PG: 1.57 MMHG
BH CV ECHO MEAS - LV MEAN PG: 0.92 MMHG
BH CV ECHO MEAS - LV SYSTOLIC VOL/BSA (12-30): 14.7 CM2
BH CV ECHO MEAS - LV V1 MAX: 62.6 CM/SEC
BH CV ECHO MEAS - LV V1 VTI: 15.9 CM
BH CV ECHO MEAS - LVIDD: 5.1 CM
BH CV ECHO MEAS - LVIDS: 3.1 CM
BH CV ECHO MEAS - LVOT AREA: 4.3 CM2
BH CV ECHO MEAS - LVOT DIAM: 2.34 CM
BH CV ECHO MEAS - LVPWD: 1.2 CM
BH CV ECHO MEAS - MED PEAK E' VEL: 9.8 CM/SEC
BH CV ECHO MEAS - MR MAX PG: 30.1 MMHG
BH CV ECHO MEAS - MR MAX VEL: 274.2 CM/SEC
BH CV ECHO MEAS - MV DEC SLOPE: 545 CM/SEC2
BH CV ECHO MEAS - MV DEC TIME: 0.14 MSEC
BH CV ECHO MEAS - MV E MAX VEL: 88.7 CM/SEC
BH CV ECHO MEAS - MV MAX PG: 3.6 MMHG
BH CV ECHO MEAS - MV MEAN PG: 1.25 MMHG
BH CV ECHO MEAS - MV P1/2T: 54.3 MSEC
BH CV ECHO MEAS - MV V2 VTI: 21.7 CM
BH CV ECHO MEAS - MVA(P1/2T): 4 CM2
BH CV ECHO MEAS - MVA(VTI): 3.1 CM2
BH CV ECHO MEAS - PA ACC TIME: 0.1 SEC
BH CV ECHO MEAS - PA PR(ACCEL): 36.2 MMHG
BH CV ECHO MEAS - PA V2 MAX: 73.7 CM/SEC
BH CV ECHO MEAS - RAP SYSTOLE: 3 MMHG
BH CV ECHO MEAS - RV MAX PG: 1.28 MMHG
BH CV ECHO MEAS - RV V1 MAX: 56.6 CM/SEC
BH CV ECHO MEAS - RV V1 VTI: 9.4 CM
BH CV ECHO MEAS - RVSP: 13 MMHG
BH CV ECHO MEAS - SI(MOD-SP2): 19.5 ML/M2
BH CV ECHO MEAS - SI(MOD-SP4): 17.3 ML/M2
BH CV ECHO MEAS - SV(LVOT): 68.2 ML
BH CV ECHO MEAS - SV(MOD-SP2): 44 ML
BH CV ECHO MEAS - SV(MOD-SP4): 39 ML
BH CV ECHO MEAS - TAPSE (>1.6): 1.3 CM
BH CV ECHO MEAS - TR MAX PG: 10 MMHG
BH CV ECHO MEAS - TR MAX VEL: 158.1 CM/SEC
BH CV ECHO MEASUREMENTS AVERAGE E/E' RATIO: 8.25
BH CV XLRA - RV BASE: 3.3 CM
BH CV XLRA - TDI S': 8.3 CM/SEC
BILIRUB UR QL STRIP: NEGATIVE
CLARITY UR: ABNORMAL
COLOR UR: YELLOW
DEPRECATED RDW RBC AUTO: 39.4 FL (ref 37–54)
EOSINOPHIL # BLD AUTO: 0.24 10*3/MM3 (ref 0–0.4)
EOSINOPHIL NFR BLD AUTO: 2.8 % (ref 0.3–6.2)
ERYTHROCYTE [DISTWIDTH] IN BLOOD BY AUTOMATED COUNT: 12.4 % (ref 12.3–15.4)
GLUCOSE BLDC GLUCOMTR-MCNC: 115 MG/DL (ref 70–130)
GLUCOSE BLDC GLUCOMTR-MCNC: 172 MG/DL (ref 70–130)
GLUCOSE BLDC GLUCOMTR-MCNC: 183 MG/DL (ref 70–130)
GLUCOSE BLDC GLUCOMTR-MCNC: 213 MG/DL (ref 70–130)
GLUCOSE UR STRIP-MCNC: NEGATIVE MG/DL
HCO3 BLDA-SCNC: 23.1 MMOL/L (ref 22–28)
HCT VFR BLD AUTO: 44.8 % (ref 37.5–51)
HGB BLD-MCNC: 15.8 G/DL (ref 13–17.7)
HGB UR QL STRIP.AUTO: NEGATIVE
IMM GRANULOCYTES # BLD AUTO: 0.03 10*3/MM3 (ref 0–0.05)
IMM GRANULOCYTES NFR BLD AUTO: 0.3 % (ref 0–0.5)
KETONES UR QL STRIP: NEGATIVE
LEFT ATRIUM VOLUME INDEX: 22.4 ML/M2
LEUKOCYTE ESTERASE UR QL STRIP.AUTO: NEGATIVE
LYMPHOCYTES # BLD AUTO: 1.79 10*3/MM3 (ref 0.7–3.1)
LYMPHOCYTES NFR BLD AUTO: 20.7 % (ref 19.6–45.3)
MAXIMAL PREDICTED HEART RATE: 155 BPM
MCH RBC QN AUTO: 30.6 PG (ref 26.6–33)
MCHC RBC AUTO-ENTMCNC: 35.3 G/DL (ref 31.5–35.7)
MCV RBC AUTO: 86.8 FL (ref 79–97)
MODALITY: ABNORMAL
MONOCYTES # BLD AUTO: 0.96 10*3/MM3 (ref 0.1–0.9)
MONOCYTES NFR BLD AUTO: 11.1 % (ref 5–12)
NEUTROPHILS NFR BLD AUTO: 5.61 10*3/MM3 (ref 1.7–7)
NEUTROPHILS NFR BLD AUTO: 64.8 % (ref 42.7–76)
NITRITE UR QL STRIP: NEGATIVE
NRBC BLD AUTO-RTO: 0 /100 WBC (ref 0–0.2)
PCO2 BLDA: 33.9 MM HG (ref 35–45)
PH BLDA: 7.44 PH UNITS (ref 7.35–7.45)
PH UR STRIP.AUTO: <=5 [PH] (ref 5–8)
PLATELET # BLD AUTO: 143 10*3/MM3 (ref 140–450)
PMV BLD AUTO: 10.8 FL (ref 6–12)
PO2 BLDA: 74.1 MM HG (ref 80–100)
PROT UR QL STRIP: ABNORMAL
RBC # BLD AUTO: 5.16 10*6/MM3 (ref 4.14–5.8)
SAO2 % BLDCOA: 95.4 % (ref 92–99)
SARS-COV-2 ORF1AB RESP QL NAA+PROBE: NOT DETECTED
SP GR UR STRIP: 1.01 (ref 1–1.03)
STRESS TARGET HR: 132 BPM
TOTAL RATE: 18 BREATHS/MINUTE
UROBILINOGEN UR QL STRIP: ABNORMAL
WBC NRBC COR # BLD: 8.66 10*3/MM3 (ref 3.4–10.8)

## 2023-01-09 PROCEDURE — 82803 BLOOD GASES ANY COMBINATION: CPT

## 2023-01-09 PROCEDURE — U0004 COV-19 TEST NON-CDC HGH THRU: HCPCS

## 2023-01-09 PROCEDURE — U0005 INFEC AGEN DETEC AMPLI PROBE: HCPCS

## 2023-01-09 PROCEDURE — 94060 EVALUATION OF WHEEZING: CPT

## 2023-01-09 PROCEDURE — 94640 AIRWAY INHALATION TREATMENT: CPT

## 2023-01-09 PROCEDURE — 36600 WITHDRAWAL OF ARTERIAL BLOOD: CPT

## 2023-01-09 PROCEDURE — 71250 CT THORAX DX C-: CPT

## 2023-01-09 PROCEDURE — 81003 URINALYSIS AUTO W/O SCOPE: CPT

## 2023-01-09 PROCEDURE — 63710000001 INSULIN LISPRO (HUMAN) PER 5 UNITS

## 2023-01-09 PROCEDURE — 82962 GLUCOSE BLOOD TEST: CPT

## 2023-01-09 PROCEDURE — 85025 COMPLETE CBC W/AUTO DIFF WBC: CPT | Performed by: INTERNAL MEDICINE

## 2023-01-09 RX ORDER — CHLORHEXIDINE GLUCONATE 500 MG/1
1 CLOTH TOPICAL EVERY 12 HOURS
Status: DISCONTINUED | OUTPATIENT
Start: 2023-01-09 | End: 2023-01-10

## 2023-01-09 RX ORDER — SODIUM CHLORIDE 0.9 % (FLUSH) 0.9 %
10 SYRINGE (ML) INJECTION EVERY 12 HOURS SCHEDULED
Status: DISCONTINUED | OUTPATIENT
Start: 2023-01-09 | End: 2023-01-10 | Stop reason: HOSPADM

## 2023-01-09 RX ORDER — ALBUTEROL SULFATE 2.5 MG/3ML
2.5 SOLUTION RESPIRATORY (INHALATION) ONCE
Status: COMPLETED | OUTPATIENT
Start: 2023-01-09 | End: 2023-01-09

## 2023-01-09 RX ORDER — SODIUM CHLORIDE 9 MG/ML
40 INJECTION, SOLUTION INTRAVENOUS AS NEEDED
Status: DISCONTINUED | OUTPATIENT
Start: 2023-01-09 | End: 2023-01-10 | Stop reason: HOSPADM

## 2023-01-09 RX ORDER — LORAZEPAM 2 MG/ML
1 INJECTION INTRAMUSCULAR ONCE
Status: COMPLETED | OUTPATIENT
Start: 2023-01-10 | End: 2023-01-10

## 2023-01-09 RX ORDER — ACETAMINOPHEN 500 MG
1000 TABLET ORAL ONCE
Status: COMPLETED | OUTPATIENT
Start: 2023-01-09 | End: 2023-01-09

## 2023-01-09 RX ORDER — CEFAZOLIN SODIUM 2 G/100ML
2 INJECTION, SOLUTION INTRAVENOUS ONCE
Status: COMPLETED | OUTPATIENT
Start: 2023-01-09 | End: 2023-01-10

## 2023-01-09 RX ORDER — SODIUM CHLORIDE 0.9 % (FLUSH) 0.9 %
10 SYRINGE (ML) INJECTION AS NEEDED
Status: DISCONTINUED | OUTPATIENT
Start: 2023-01-09 | End: 2023-01-10 | Stop reason: HOSPADM

## 2023-01-09 RX ORDER — FAMOTIDINE 10 MG/ML
20 INJECTION, SOLUTION INTRAVENOUS ONCE
Status: COMPLETED | OUTPATIENT
Start: 2023-01-10 | End: 2023-01-10

## 2023-01-09 RX ORDER — CHLORHEXIDINE GLUCONATE 0.12 MG/ML
15 RINSE ORAL EVERY 12 HOURS SCHEDULED
Status: COMPLETED | OUTPATIENT
Start: 2023-01-09 | End: 2023-01-10

## 2023-01-09 RX ADMIN — CARVEDILOL 25 MG: 25 TABLET, FILM COATED ORAL at 16:36

## 2023-01-09 RX ADMIN — CARVEDILOL 50 MG: 25 TABLET, FILM COATED ORAL at 08:16

## 2023-01-09 RX ADMIN — MUPIROCIN 1 APPLICATION: 20 OINTMENT TOPICAL at 20:41

## 2023-01-09 RX ADMIN — INSULIN LISPRO 4 UNITS: 100 INJECTION, SOLUTION INTRAVENOUS; SUBCUTANEOUS at 16:36

## 2023-01-09 RX ADMIN — SPIRONOLACTONE 25 MG: 25 TABLET, FILM COATED ORAL at 08:15

## 2023-01-09 RX ADMIN — Medication 10 ML: at 20:43

## 2023-01-09 RX ADMIN — INSULIN LISPRO 4 UNITS: 100 INJECTION, SOLUTION INTRAVENOUS; SUBCUTANEOUS at 20:42

## 2023-01-09 RX ADMIN — FUROSEMIDE 40 MG: 40 TABLET ORAL at 08:16

## 2023-01-09 RX ADMIN — Medication 10 ML: at 08:16

## 2023-01-09 RX ADMIN — ASPIRIN 81 MG: 81 TABLET, CHEWABLE ORAL at 08:15

## 2023-01-09 RX ADMIN — ALBUTEROL SULFATE 2.5 MG: 2.5 SOLUTION RESPIRATORY (INHALATION) at 06:52

## 2023-01-09 RX ADMIN — ATORVASTATIN CALCIUM 80 MG: 80 TABLET, FILM COATED ORAL at 08:15

## 2023-01-09 RX ADMIN — INSULIN LISPRO 10 UNITS: 100 INJECTION, SOLUTION INTRAVENOUS; SUBCUTANEOUS at 16:36

## 2023-01-09 RX ADMIN — CHLORHEXIDINE GLUCONATE 15 ML: 1.2 SOLUTION ORAL at 20:42

## 2023-01-09 RX ADMIN — INSULIN GLARGINE-YFGN 50 UNITS: 100 INJECTION, SOLUTION SUBCUTANEOUS at 20:42

## 2023-01-09 RX ADMIN — INSULIN LISPRO 8 UNITS: 100 INJECTION, SOLUTION INTRAVENOUS; SUBCUTANEOUS at 11:35

## 2023-01-09 RX ADMIN — ACETAMINOPHEN 1000 MG: 500 TABLET, FILM COATED ORAL at 20:40

## 2023-01-09 RX ADMIN — INSULIN LISPRO 10 UNITS: 100 INJECTION, SOLUTION INTRAVENOUS; SUBCUTANEOUS at 08:16

## 2023-01-09 RX ADMIN — INSULIN LISPRO 10 UNITS: 100 INJECTION, SOLUTION INTRAVENOUS; SUBCUTANEOUS at 11:35

## 2023-01-09 NOTE — PROGRESS NOTES
" LOS: 2 days   Patient Care Team:  Eric Eubanks MD as PCP - General (Family Medicine)  Jamir Hughes MD as Consulting Physician (Nephrology)    Chief Complaint: post op    Subjective:  Symptoms:  No shortness of breath or chest pain.    Diet:  Adequate intake.    Activity level: Normal.          Vital Signs  Temp:  [98.1 °F (36.7 °C)-98.4 °F (36.9 °C)] 98.1 °F (36.7 °C)  Heart Rate:  [70-96] 96  Resp:  [16-18] 16  BP: (103-114)/(63-84) 114/84  Body mass index is 36.18 kg/m².    Intake/Output Summary (Last 24 hours) at 1/9/2023 0853  Last data filed at 1/9/2023 0725  Gross per 24 hour   Intake 240 ml   Output 750 ml   Net -510 ml     I/O this shift:  In: 240 [P.O.:240]  Out: -     Chest tube drainage last 8 hours         01/05/23  0837 01/05/23  0903 01/08/23  0757   Weight: 111 kg (245 lb) 111 kg (245 lb) 111 kg (245 lb)         Objective:  General Appearance:  Comfortable and in no acute distress.    Vital signs: (most recent): Blood pressure 105/72, pulse 92, temperature 98.1 °F (36.7 °C), temperature source Oral, resp. rate 16, height 175.3 cm (69\"), weight 111 kg (245 lb), SpO2 95 %.  Vital signs are normal.    Output: Producing urine and producing stool.    HEENT: Normal HEENT exam.    Lungs:  Normal effort and normal respiratory rate.    Heart: Normal rate.  Regular rhythm.    Abdomen: Bowel sounds are normal.               Results Review:        WBC WBC   Date Value Ref Range Status   01/09/2023 8.66 3.40 - 10.80 10*3/mm3 Final   01/08/2023 9.01 3.40 - 10.80 10*3/mm3 Final   01/07/2023 6.85 3.40 - 10.80 10*3/mm3 Final      HGB Hemoglobin   Date Value Ref Range Status   01/09/2023 15.8 13.0 - 17.7 g/dL Final   01/08/2023 16.2 13.0 - 17.7 g/dL Final   01/07/2023 16.7 13.0 - 17.7 g/dL Final      HCT Hematocrit   Date Value Ref Range Status   01/09/2023 44.8 37.5 - 51.0 % Final   01/08/2023 47.4 37.5 - 51.0 % Final   01/07/2023 48.8 37.5 - 51.0 % Final      Platelets Platelets   Date Value Ref Range " Status   01/09/2023 143 140 - 450 10*3/mm3 Final   01/08/2023 153 140 - 450 10*3/mm3 Final   01/07/2023 132 (L) 140 - 450 10*3/mm3 Final        PT/INR:  No results found for: PROTIME/No results found for: INR    Sodium Sodium   Date Value Ref Range Status   01/08/2023 138 136 - 145 mmol/L Final   01/07/2023 134 (L) 136 - 145 mmol/L Final      Potassium Potassium   Date Value Ref Range Status   01/08/2023 3.4 (L) 3.5 - 5.2 mmol/L Final   01/07/2023 4.2 3.5 - 5.2 mmol/L Final      Chloride Chloride   Date Value Ref Range Status   01/08/2023 102 98 - 107 mmol/L Final   01/07/2023 97 (L) 98 - 107 mmol/L Final      Bicarbonate CO2   Date Value Ref Range Status   01/08/2023 22.3 22.0 - 29.0 mmol/L Final   01/07/2023 25.0 22.0 - 29.0 mmol/L Final      BUN BUN   Date Value Ref Range Status   01/08/2023 22 8 - 23 mg/dL Final   01/07/2023 22 8 - 23 mg/dL Final      Creatinine Creatinine   Date Value Ref Range Status   01/08/2023 1.21 0.76 - 1.27 mg/dL Final   01/07/2023 1.18 0.76 - 1.27 mg/dL Final      Calcium Calcium   Date Value Ref Range Status   01/08/2023 8.8 8.6 - 10.5 mg/dL Final   01/07/2023 9.2 8.6 - 10.5 mg/dL Final      Magnesium No results found for: MG       aspirin, 81 mg, Oral, Daily  atorvastatin, 80 mg, Oral, Daily  carvedilol, 25 mg, Oral, Q PM  ceFAZolin, 2 g, Intravenous, Once  chlorhexidine, 15 mL, Mouth/Throat, Once  furosemide, 40 mg, Oral, Daily  insulin glargine, 50 Units, Subcutaneous, Nightly  insulin lispro, 0-20 Units, Subcutaneous, 4x Daily With Meals & Nightly  insulin lispro, 10 Units, Subcutaneous, TID With Meals  sodium chloride, 10 mL, Intravenous, Q12H  spironolactone, 25 mg, Oral, Daily               Patient Active Problem List   Diagnosis Code   • Obesity (BMI 30.0-34.9) E66.9   • CHF (congestive heart failure), NYHA class I ([facility]) I50.9   • Type 2 diabetes mellitus without complication ([facility]) E11.9   • Hyperlipidemia E78.5   • Coronary artery disease I25.10   • Anterior myocardial  infarction ([facility]) I21.09   • Skin lesion L98.9   • Chest pain R07.9   • Chest pain, unspecified type R07.9   • NSTEMI (non-ST elevated myocardial infarction) ([facility]) I21.4   • Abnormal findings on diagnostic imaging of heart and coronary circulation R93.1       Assessment & Plan    -NSTEMI, Severe multivessel CAD  -Poorly controlled diabetes type 2 A1C 12  -Elevated right hemidiaphragm  -Hyperlipidemia  -atrial fibrillation paroxsymal  -Obesity    Plan for OR tomorrow with Dr. Ron Lawson, APRN  01/09/23  08:53 EST

## 2023-01-09 NOTE — CASE MANAGEMENT/SOCIAL WORK
Continued Stay Note  [facility]     Patient Name: Joshua Wolf  MRN: [id number]  Today's Date: 1/9/2023    Admit Date: 1/5/2023    Plan: Follow up after surgery   Discharge Plan     Row Name 01/09/23 1354       Plan    Plan Follow up after surgery    Plan Comments CCP reviewed chart; plans for surgery tomorrow. CCP will follow up post-op to discuss d/c plans. Erna SHEN               Discharge Codes    No documentation.               Expected Discharge Date and Time     Expected Discharge Date Expected Discharge Time    Jan 17, 2023             HERMELINDA Brannon

## 2023-01-09 NOTE — PROGRESS NOTES
Joshua Wolf   67 y.o.  male    LOS: 2 days   Patient Care Team:  Eric Eubanks MD as PCP - General (Family Medicine)  Jamir Hughes MD as Consulting Physician (Nephrology)      Subjective   No angina  Interval History:     Patient Complaints:     Review of Systems:       Medication Review:   Current Facility-Administered Medications:   •  acetaminophen (TYLENOL) tablet 650 mg, 650 mg, Oral, Q4H PRN, Bernardo Dominguez MD  •  aspirin chewable tablet 81 mg, 81 mg, Oral, Daily, Bernardo Dominguez MD, 81 mg at 01/09/23 0815  •  atorvastatin (LIPITOR) tablet 80 mg, 80 mg, Oral, Daily, Bernardo Dominguez MD, 80 mg at 01/09/23 0815  •  carvedilol (COREG) tablet 25 mg, 25 mg, Oral, Q PM, Bernardo Dominguez MD, 25 mg at 01/08/23 1559  •  ceFAZolin in dextrose (ANCEF) IVPB solution 2 g, 2 g, Intravenous, Once, Alessandra Duarte APRN  •  chlorhexidine (PERIDEX) 0.12 % solution 15 mL, 15 mL, Mouth/Throat, Q12H, Alessandra Duarte APRN  •  Chlorhexidine Gluconate Cloth 2 % pads 1 application, 1 application, Topical, Q12H PRN, Jr Alexis Campos MD  •  Chlorhexidine Gluconate Cloth 2 % pads 1 application, 1 application, Topical, Q12H, Alessandra Duarte APRN  •  dextrose (D50W) (25 g/50 mL) IV injection 25 g, 25 g, Intravenous, Q15 Min PRN, Bernardo Dominguez MD  •  dextrose (GLUTOSE) oral gel 15 g, 15 g, Oral, Q15 Min PRN, Bernardo Dominguez MD  •  furosemide (LASIX) tablet 40 mg, 40 mg, Oral, Daily, Bernardo Dominguez MD, 40 mg at 01/09/23 0816  •  glucagon (human recombinant) (GLUCAGEN DIAGNOSTIC) injection 1 mg, 1 mg, Intramuscular, Q15 Min PRN, Bernardo Dominguez MD  •  insulin glargine (LANTUS, SEMGLEE) injection 50 Units, 50 Units, Subcutaneous, Nightly, Dima Velasquez MD, 50 Units at 01/08/23 2027  •  insulin lispro (ADMELOG) injection 0-20 Units, 0-20 Units, Subcutaneous, 4x Daily With Meals & Nightly, Dima Velasquez MD, 8 Units at 01/08/23 1557  •  insulin lispro (ADMELOG) injection 10 Units, 10 Units,  Subcutaneous, TID With Meals, Dima Velasquez MD, 10 Units at 01/09/23 0816  •  melatonin tablet 5 mg, 5 mg, Oral, Nightly PRN, Bernardo Dominguez MD  •  [START ON 1/10/2023] metoprolol tartrate (LOPRESSOR) tablet 12.5 mg, 12.5 mg, Oral, On Call to OR, Alessandra Duarte APRN  •  mupirocin (BACTROBAN) 2 % nasal ointment 1 application, 1 application, Each Nare, Q12H, Alessandra Duarte APRN  •  nitroglycerin (NITROSTAT) SL tablet 0.4 mg, 0.4 mg, Sublingual, Q5 Min PRN, Bernardo Dominguez MD  •  nitroglycerin (NITROSTAT) SL tablet 0.4 mg, 0.4 mg, Sublingual, Q5 Min PRN, Jr Alexis Campos MD  •  ondansetron (ZOFRAN) tablet 4 mg, 4 mg, Oral, Q6H PRN **OR** ondansetron (ZOFRAN) injection 4 mg, 4 mg, Intravenous, Q6H PRN, Bernardo Dominguez MD  •  potassium chloride (K-DUR,KLOR-CON) ER tablet 40 mEq, 40 mEq, Oral, PRN, 40 mEq at 01/08/23 0704 **OR** potassium chloride (KLOR-CON) packet 40 mEq, 40 mEq, Oral, PRN **OR** potassium chloride 10 mEq in 100 mL IVPB, 10 mEq, Intravenous, Q1H PRN, Jr Alexis Campos MD  •  sodium chloride 0.9 % flush 10 mL, 10 mL, Intravenous, Q12H, Bernardo Dominguez MD, 10 mL at 01/09/23 0816  •  sodium chloride 0.9 % flush 10 mL, 10 mL, Intravenous, PRN, Bernardo Dominguez MD  •  sodium chloride 0.9 % infusion 40 mL, 40 mL, Intravenous, PRN, Bernardo Dominguez MD  •  spironolactone (ALDACTONE) tablet 25 mg, 25 mg, Oral, Daily, Bernardo Dominguez MD, 25 mg at 01/09/23 0815      Objective   Vital Sign Min/Max for last 24 hours  Temp  Min: 98.1 °F (36.7 °C)  Max: 98.4 °F (36.9 °C)   BP  Min: 103/63  Max: 114/84    Pulse  Min: 70  Max: 96     Wt Readings from Last 3 Encounters:   01/08/23 111 kg (245 lb)   04/12/22 118 kg (260 lb 12.8 oz)   10/14/21 112 kg (246 lb)        Intake/Output Summary (Last 24 hours) at 1/9/2023 1024  Last data filed at 1/9/2023 0725  Gross per 24 hour   Intake 240 ml   Output 750 ml   Net -510 ml     Physical Exam:      General Appearance:    Well developed and well  nourished in no acute distress   Head:    Normocephalic, atraumatic   Eyes:            Conjunctivae normal, anicteric, no xanthelasma   Neck:   supple, trachea midline, no thyromegaly, no carotid bruit, no JVD, no elevated CVP   Lungs:     Clear to auscultation,respirations regular, even and                  unlabored    Heart:    Regular rhythm and normal rate, normal S1 and S2,            No murmur, no gallop, no rub, no click   Chest Wall:    No abnormalities observed   Abdomen:     Normal bowel sounds, no masses, no organomegaly, soft        nontender, nondistended, no guarding, no rebound                tenderness   Rectal:     Deferred   Extremities:   No edema. Moves all extremities well, no cyanosis, no erythema   Pulses:   Pulses palpable and equal bilaterally   Skin:   No bleeding, bruising or rash   Neurologic:   awake alert and oriented x3, speech clear and approp, no facial drooping     :    Monitor:      Results Review:         Sodium Sodium   Date Value Ref Range Status   01/08/2023 138 136 - 145 mmol/L Final   01/07/2023 134 (L) 136 - 145 mmol/L Final      Potassium Potassium   Date Value Ref Range Status   01/08/2023 3.4 (L) 3.5 - 5.2 mmol/L Final   01/07/2023 4.2 3.5 - 5.2 mmol/L Final      Chloride Chloride   Date Value Ref Range Status   01/08/2023 102 98 - 107 mmol/L Final   01/07/2023 97 (L) 98 - 107 mmol/L Final      Bicarbonate No results found for: PLASMABICARB   BUN BUN   Date Value Ref Range Status   01/08/2023 22 8 - 23 mg/dL Final   01/07/2023 22 8 - 23 mg/dL Final      Creatinine Creatinine   Date Value Ref Range Status   01/08/2023 1.21 0.76 - 1.27 mg/dL Final   01/07/2023 1.18 0.76 - 1.27 mg/dL Final      Calcium Calcium   Date Value Ref Range Status   01/08/2023 8.8 8.6 - 10.5 mg/dL Final   01/07/2023 9.2 8.6 - 10.5 mg/dL Final      Magnesium No results found for: MG     Results from last 7 days   Lab Units 01/09/23  0338   WBC 10*3/mm3 8.66   HEMOGLOBIN g/dL 15.8   HEMATOCRIT %  44.8   PLATELETS 10*3/mm3 143     Lab Results   Lab Value Date/Time    TROPONINT 0.136 (C) 01/06/2023 0552    TROPONINT 0.164 (C) 01/06/2023 0024    TROPONINT 0.178 (C) 01/05/2023 1746    TROPONINT 0.143 (C) 01/05/2023 1508    TROPONINT <0.010 01/05/2023 0858    TROPONINT <0.010 10/30/2017 0114    TROPONINT <0.010 08/12/2016 0944    TROPONINT 0.246 (C) 07/19/2016 0404    TROPONINT <0.010 07/18/2016 0956            Echo EF Estimated  Lab Results   Component Value Date    ECHOEFEST 35 07/18/2016     Findings:  1.  Left heart catheterization: LVEDP 10 mmHg . No gradient across AV valve.      2.  Left ventriculography:  LV function 55-60%  LVEF normal .      3.  Selective native coronary angiography:  A. Left main bifurcates into LAD and left circumflex.  Distal left main with 10% stenosis.  B. LAD: Large caliber vessel.  Has stents in the proximal segment.  Ostial LAD has 20% stenosis followed by proximal segment with 20 to 30% stenosis.  Mid LAD has 70% stenosis.  Distal LAD has 70% stenosis.  LAD gives rise to 1 prominent diagonal branch with luminal irregularities up to 20-30%.  C. Left circumflex: Large caliber vessel.  Gives rise to a high obtuse marginal 1 branch with mid 80% stenosis followed by a small caliber vessel OM 2 with ostial 70% stenosis.  Large caliber OM 3 distally is occluded subtotally.  D. Right coronary artery: Large-caliber dominant vessel with mid to distal junction has 80% stenosis.     4.  RFR of LAD 0.82 /hemodynamically significant.     Conclusion:  Multivessel coronary artery disease.  Normal LVEDP and LV function.     Recommendations:  Given multivessel coronary artery disease with background of diabetes, preserved LV function patient would benefit from coronary artery bypass grafting evaluation.  Aggressive risk factor modification.     Left Ventricle Left ventricular systolic function is low normal. Left ventricular ejection fraction appears to be 51 - 55%.     Normal left ventricular  cavity size noted. Left ventricular wall thickness is consistent with mild concentric hypertrophy. All left ventricular wall segments contract normally. Left ventricular diastolic function was normal. No evidence of left ventricular thrombus or mass present.   Right Ventricle Normal right ventricular cavity size, wall thickness, systolic function and septal motion noted.   Left Atrium The left atrial cavity is mildly dilated.   Right Atrium Normal right atrial cavity size noted.   Aortic Valve The aortic valve is structurally normal with no stenosis present. Mild aortic valve regurgitation is present.   Mitral Valve The mitral valve is structurally normal with no regurgitation or significant stenosis present.   Tricuspid Valve The tricuspid valve is structurally normal with no significant regurgitation or significant stenosis present. Estimated right ventricular systolic pressure from tricuspid regurgitation is normal (<35 mmHg).   Pulmonic Valve The pulmonic valve is not well visualized.   Greater Vessels No dilation of the aortic root is present. No dilation of the sinuses of Valsalva is present.   Pericardium           Assessment/ Plan  Assessment & Plan   Active Hospital Problems    Diagnosis  POA   • **Chest pain [R07.9]  Yes   • Chest pain, unspecified type [R07.9]  Yes   • NSTEMI (non-ST elevated myocardial infarction) (HCC) [I21.4]  Unknown     Added automatically from request for surgery [id number]     • Abnormal findings on diagnostic imaging of heart and coronary circulation [R93.1]  Unknown     Added automatically from request for surgery [id number]         Chest pain, unspecified type    NSTEMI (non-ST elevated myocardial infarction) (HCC)    Abnormal findings on diagnostic imaging of heart and coronary circulation        A/P: 68 yo male with Known CAD with CP.     1. CP - unstable angina - in patient with previous PCI of LAD x 2 - repeat LHC showed diffuse disease distal to previous stents in the LAD, LCx  has 80% OM1 and 70% OM2, with occlusion of OM3 distally, RCA with mid diffuse 80% disease. EF normal. Plan for surgical revascularization - patient does not want to proceed on Monday and is awaiting speaking to the surgeon.     2. HTN - controlled.     3. HLD - on Statin, treat to goal.     4. Atrial Fibrillation - paroxysmal, unclear how symptomatic - elevated [id number] score of at least 4 - should be on life long AC - not on AC as awaiting surgery - would recommend Amiodarone infusion around the time of surgery or in the postoperative setting as I surmise he will likely have post operative Atrial Fibrillation.     CABG in AM        Tyson Chun MD  01/09/23  10:24 EST

## 2023-01-09 NOTE — ANESTHESIA PREPROCEDURE EVALUATION
Anesthesia Evaluation     Patient summary reviewed and Nursing notes reviewed                Airway   Mallampati: I  TM distance: >3 FB  Neck ROM: full  No difficulty expected  Dental - normal exam     Pulmonary - normal exam   Cardiovascular - normal exam  Exercise tolerance: poor (<4 METS)    ECG reviewed  Patient on routine beta blocker    (+) past MI  1-7 days, CAD, dysrhythmias Paroxysmal Atrial Fib, CHF Diastolic >=55%, hyperlipidemia,       Neuro/Psych  (+) seizures well controlled,    GI/Hepatic/Renal/Endo    (+) obesity,   diabetes mellitus type 2,     Musculoskeletal     Abdominal   (+) obese,     Bowel sounds: normal.   Substance History      OB/GYN          Other                        Anesthesia Plan    ASA 4     general, Guillermina, CVL and PAC     intravenous induction   Postoperative Plan: Expected vent after surgery  Anesthetic plan, risks, benefits, and alternatives have been provided, discussed and informed consent has been obtained with: patient.  Pre-procedure education provided  Use of blood products discussed with patient  Consented to blood products.       CODE STATUS:    Level Of Support Discussed With: Patient  Code Status (Patient has no pulse and is not breathing): CPR (Attempt to Resuscitate)  Medical Interventions (Patient has pulse or is breathing): Full Support

## 2023-01-09 NOTE — PLAN OF CARE
Goal Outcome Evaluation:   Preop testings done for pt. Awaiting covid results which was taken this am. No c.o of CP. Vitals stable.will cont to closely monitor pt

## 2023-01-10 ENCOUNTER — ANESTHESIA (OUTPATIENT)
Dept: PERIOP | Facility: HOSPITAL | Age: 68
DRG: 234 | End: 2023-01-10
Payer: MEDICARE

## 2023-01-10 ENCOUNTER — APPOINTMENT (OUTPATIENT)
Dept: GENERAL RADIOLOGY | Facility: HOSPITAL | Age: 68
DRG: 234 | End: 2023-01-10
Payer: MEDICARE

## 2023-01-10 ENCOUNTER — ANCILLARY PROCEDURE (OUTPATIENT)
Dept: PERIOP | Facility: HOSPITAL | Age: 68
DRG: 234 | End: 2023-01-10
Payer: MEDICARE

## 2023-01-10 LAB
ACT BLD: 119 SECONDS (ref 82–152)
ACT BLD: 125 SECONDS (ref 82–152)
ACT BLD: 311 SECONDS (ref 82–152)
ACT BLD: 407 SECONDS (ref 82–152)
ACT BLD: 510 SECONDS (ref 82–152)
ACT BLD: 522 SECONDS (ref 82–152)
ALBUMIN SERPL-MCNC: 4.2 G/DL (ref 3.5–5.2)
ALBUMIN SERPL-MCNC: 4.2 G/DL (ref 3.5–5.2)
ANION GAP SERPL CALCULATED.3IONS-SCNC: 11.1 MMOL/L (ref 5–15)
ANION GAP SERPL CALCULATED.3IONS-SCNC: 11.7 MMOL/L (ref 5–15)
ANION GAP SERPL CALCULATED.3IONS-SCNC: 13.9 MMOL/L (ref 5–15)
APTT PPP: 27.5 SECONDS (ref 22.7–35.4)
ARTERIAL PATENCY WRIST A: ABNORMAL
ARTERIAL PATENCY WRIST A: ABNORMAL
ATMOSPHERIC PRESS: 744.8 MMHG
ATMOSPHERIC PRESS: 746.3 MMHG
BASE EXCESS BLDA CALC-SCNC: -1 MMOL/L (ref -5–5)
BASE EXCESS BLDA CALC-SCNC: -2 MMOL/L (ref -5–5)
BASE EXCESS BLDA CALC-SCNC: -2.9 MMOL/L (ref 0–2)
BASE EXCESS BLDA CALC-SCNC: -3 MMOL/L (ref -5–5)
BASE EXCESS BLDA CALC-SCNC: -4.4 MMOL/L (ref 0–2)
BASE EXCESS BLDA CALC-SCNC: 0 MMOL/L (ref -5–5)
BASOPHILS # BLD AUTO: 0.03 10*3/MM3 (ref 0–0.2)
BASOPHILS NFR BLD AUTO: 0.3 % (ref 0–1.5)
BDY SITE: ABNORMAL
BDY SITE: ABNORMAL
BUN SERPL-MCNC: 21 MG/DL (ref 8–23)
BUN SERPL-MCNC: 23 MG/DL (ref 8–23)
BUN SERPL-MCNC: 25 MG/DL (ref 8–23)
BUN/CREAT SERPL: 15.7 (ref 7–25)
BUN/CREAT SERPL: 16.8 (ref 7–25)
BUN/CREAT SERPL: 20 (ref 7–25)
CA-I BLD-MCNC: 5.2 MG/DL (ref 4.6–5.4)
CA-I BLDA-SCNC: ABNORMAL MMOL/L
CA-I SERPL ISE-MCNC: 1.29 MMOL/L (ref 1.15–1.35)
CALCIUM SPEC-SCNC: 10 MG/DL (ref 8.6–10.5)
CALCIUM SPEC-SCNC: 8.9 MG/DL (ref 8.6–10.5)
CALCIUM SPEC-SCNC: 9.2 MG/DL (ref 8.6–10.5)
CHLORIDE SERPL-SCNC: 102 MMOL/L (ref 98–107)
CHLORIDE SERPL-SCNC: 104 MMOL/L (ref 98–107)
CHLORIDE SERPL-SCNC: 107 MMOL/L (ref 98–107)
CO2 BLDA-SCNC: 24 MMOL/L (ref 24–29)
CO2 BLDA-SCNC: 25 MMOL/L (ref 24–29)
CO2 BLDA-SCNC: 26 MMOL/L (ref 24–29)
CO2 BLDA-SCNC: 26 MMOL/L (ref 24–29)
CO2 BLDA-SCNC: 27 MMOL/L (ref 24–29)
CO2 BLDA-SCNC: 27 MMOL/L (ref 24–29)
CO2 SERPL-SCNC: 18.1 MMOL/L (ref 22–29)
CO2 SERPL-SCNC: 20.3 MMOL/L (ref 22–29)
CO2 SERPL-SCNC: 23.9 MMOL/L (ref 22–29)
CREAT SERPL-MCNC: 1.25 MG/DL (ref 0.76–1.27)
CREAT SERPL-MCNC: 1.34 MG/DL (ref 0.76–1.27)
CREAT SERPL-MCNC: 1.37 MG/DL (ref 0.76–1.27)
DEPRECATED RDW RBC AUTO: 39.4 FL (ref 37–54)
DEPRECATED RDW RBC AUTO: 41.3 FL (ref 37–54)
EGFRCR SERPLBLD CKD-EPI 2021: 57.2 ML/MIN/1.73
EGFRCR SERPLBLD CKD-EPI 2021: 58.8 ML/MIN/1.73
EGFRCR SERPLBLD CKD-EPI 2021: 63.9 ML/MIN/1.73
EOSINOPHIL # BLD AUTO: 0.14 10*3/MM3 (ref 0–0.4)
EOSINOPHIL NFR BLD AUTO: 1.2 % (ref 0.3–6.2)
ERYTHROCYTE [DISTWIDTH] IN BLOOD BY AUTOMATED COUNT: 12.4 % (ref 12.3–15.4)
ERYTHROCYTE [DISTWIDTH] IN BLOOD BY AUTOMATED COUNT: 12.6 % (ref 12.3–15.4)
FIBRINOGEN PPP-MCNC: 271 MG/DL (ref 219–464)
GLUCOSE BLDC GLUCOMTR-MCNC: 102 MG/DL (ref 70–130)
GLUCOSE BLDC GLUCOMTR-MCNC: 155 MG/DL (ref 70–130)
GLUCOSE BLDC GLUCOMTR-MCNC: 155 MG/DL (ref 70–130)
GLUCOSE BLDC GLUCOMTR-MCNC: 156 MG/DL (ref 70–130)
GLUCOSE BLDC GLUCOMTR-MCNC: 157 MG/DL (ref 70–130)
GLUCOSE BLDC GLUCOMTR-MCNC: 165 MG/DL (ref 70–130)
GLUCOSE BLDC GLUCOMTR-MCNC: 167 MG/DL (ref 70–130)
GLUCOSE BLDC GLUCOMTR-MCNC: 169 MG/DL (ref 70–130)
GLUCOSE BLDC GLUCOMTR-MCNC: 171 MG/DL (ref 70–130)
GLUCOSE BLDC GLUCOMTR-MCNC: 171 MG/DL (ref 70–130)
GLUCOSE BLDC GLUCOMTR-MCNC: 177 MG/DL (ref 70–130)
GLUCOSE BLDC GLUCOMTR-MCNC: 183 MG/DL (ref 70–130)
GLUCOSE SERPL-MCNC: 155 MG/DL (ref 65–99)
GLUCOSE SERPL-MCNC: 164 MG/DL (ref 65–99)
GLUCOSE SERPL-MCNC: 92 MG/DL (ref 65–99)
HCO3 BLDA-SCNC: 20.7 MMOL/L (ref 22–28)
HCO3 BLDA-SCNC: 21.2 MMOL/L (ref 22–28)
HCO3 BLDA-SCNC: 22.8 MMOL/L (ref 22–26)
HCO3 BLDA-SCNC: 23.4 MMOL/L (ref 22–26)
HCO3 BLDA-SCNC: 24.1 MMOL/L (ref 22–26)
HCO3 BLDA-SCNC: 24.4 MMOL/L (ref 22–26)
HCO3 BLDA-SCNC: 25.5 MMOL/L (ref 22–26)
HCO3 BLDA-SCNC: 25.9 MMOL/L (ref 22–26)
HCT VFR BLD AUTO: 35 % (ref 37.5–51)
HCT VFR BLD AUTO: 39.1 % (ref 37.5–51)
HCT VFR BLDA CALC: 32 % (ref 38–51)
HCT VFR BLDA CALC: 33 % (ref 38–51)
HCT VFR BLDA CALC: 33 % (ref 38–51)
HCT VFR BLDA CALC: 34 % (ref 38–51)
HCT VFR BLDA CALC: 36 % (ref 38–51)
HCT VFR BLDA CALC: 42 % (ref 38–51)
HGB BLD-MCNC: 12.3 G/DL (ref 13–17.7)
HGB BLD-MCNC: 13.2 G/DL (ref 13–17.7)
HGB BLDA-MCNC: 10.9 G/DL (ref 12–17)
HGB BLDA-MCNC: 11.2 G/DL (ref 12–17)
HGB BLDA-MCNC: 11.2 G/DL (ref 12–17)
HGB BLDA-MCNC: 11.6 G/DL (ref 12–17)
HGB BLDA-MCNC: 12.2 G/DL (ref 12–17)
HGB BLDA-MCNC: 14.3 G/DL (ref 12–17)
INHALED O2 CONCENTRATION: 100 %
INHALED O2 CONCENTRATION: 40 %
INR PPP: 1.34 (ref 0.9–1.1)
LYMPHOCYTES # BLD AUTO: 0.82 10*3/MM3 (ref 0.7–3.1)
LYMPHOCYTES NFR BLD AUTO: 7.2 % (ref 19.6–45.3)
MAGNESIUM SERPL-MCNC: 2.7 MG/DL (ref 1.6–2.4)
MAGNESIUM SERPL-MCNC: 2.8 MG/DL (ref 1.6–2.4)
MCH RBC QN AUTO: 30.2 PG (ref 26.6–33)
MCH RBC QN AUTO: 30.8 PG (ref 26.6–33)
MCHC RBC AUTO-ENTMCNC: 33.8 G/DL (ref 31.5–35.7)
MCHC RBC AUTO-ENTMCNC: 35.1 G/DL (ref 31.5–35.7)
MCV RBC AUTO: 87.7 FL (ref 79–97)
MCV RBC AUTO: 89.5 FL (ref 79–97)
MODALITY: ABNORMAL
MODALITY: ABNORMAL
MONOCYTES # BLD AUTO: 0.88 10*3/MM3 (ref 0.1–0.9)
MONOCYTES NFR BLD AUTO: 7.8 % (ref 5–12)
NEUTROPHILS NFR BLD AUTO: 82.9 % (ref 42.7–76)
NEUTROPHILS NFR BLD AUTO: 9.41 10*3/MM3 (ref 1.7–7)
O2 A-A PPRESDIFF RESPIRATORY: 0.3 MMHG
O2 A-A PPRESDIFF RESPIRATORY: 0.4 MMHG
PCO2 BLDA: 34.3 MM HG (ref 35–45)
PCO2 BLDA: 36.7 MM HG (ref 35–45)
PCO2 BLDA: 37.2 MM HG (ref 35–45)
PCO2 BLDA: 44.7 MM HG (ref 35–45)
PCO2 BLDA: 48.3 MM HG (ref 35–45)
PCO2 BLDA: 49.4 MM HG (ref 35–45)
PCO2 BLDA: 50.4 MM HG (ref 35–45)
PCO2 BLDA: 50.9 MM HG (ref 35–45)
PEEP RESPIRATORY: 7.5 CM[H2O]
PEEP RESPIRATORY: 7.5 CM[H2O]
PH BLDA: 7.32 PH UNITS (ref 7.35–7.6)
PH BLDA: 7.33 PH UNITS (ref 7.35–7.6)
PH BLDA: 7.33 PH UNITS (ref 7.35–7.6)
PH BLDA: 7.34 PH UNITS (ref 7.35–7.6)
PH BLDA: 7.35 PH UNITS (ref 7.35–7.45)
PH BLDA: 7.35 PH UNITS (ref 7.35–7.6)
PH BLDA: 7.4 PH UNITS (ref 7.35–7.45)
PH BLDA: 7.42 PH UNITS (ref 7.35–7.6)
PHOSPHATE SERPL-MCNC: 2.9 MG/DL (ref 2.5–4.5)
PHOSPHATE SERPL-MCNC: 3.6 MG/DL (ref 2.5–4.5)
PLATELET # BLD AUTO: 102 10*3/MM3 (ref 140–450)
PLATELET # BLD AUTO: 84 10*3/MM3 (ref 140–450)
PMV BLD AUTO: 10.6 FL (ref 6–12)
PMV BLD AUTO: 10.8 FL (ref 6–12)
PO2 BLDA: 178.2 MM HG (ref 80–100)
PO2 BLDA: 299 MMHG (ref 80–105)
PO2 BLDA: 307 MMHG (ref 80–105)
PO2 BLDA: 375 MMHG (ref 80–105)
PO2 BLDA: 424 MMHG (ref 80–105)
PO2 BLDA: 426 MMHG (ref 80–105)
PO2 BLDA: 49 MMHG (ref 80–105)
PO2 BLDA: 95.7 MM HG (ref 80–100)
POTASSIUM BLDA-SCNC: 3.6 MMOL/L (ref 3.5–4.9)
POTASSIUM BLDA-SCNC: 4.3 MMOL/L (ref 3.5–4.9)
POTASSIUM BLDA-SCNC: 4.6 MMOL/L (ref 3.5–4.9)
POTASSIUM BLDA-SCNC: 4.7 MMOL/L (ref 3.5–4.9)
POTASSIUM BLDA-SCNC: 4.8 MMOL/L (ref 3.5–4.9)
POTASSIUM BLDA-SCNC: 5.2 MMOL/L (ref 3.5–4.9)
POTASSIUM SERPL-SCNC: 3.9 MMOL/L (ref 3.5–5.2)
POTASSIUM SERPL-SCNC: 4.1 MMOL/L (ref 3.5–5.2)
POTASSIUM SERPL-SCNC: 4.2 MMOL/L (ref 3.5–5.2)
PROTHROMBIN TIME: 16.7 SECONDS (ref 11.7–14.2)
QT INTERVAL: 406 MS
RBC # BLD AUTO: 3.99 10*6/MM3 (ref 4.14–5.8)
RBC # BLD AUTO: 4.37 10*6/MM3 (ref 4.14–5.8)
SAO2 % BLDA: 100 % (ref 95–98)
SAO2 % BLDA: 80 % (ref 95–98)
SAO2 % BLDCOA: 97.1 % (ref 92–99)
SAO2 % BLDCOA: 99.6 % (ref 92–99)
SET MECH RESP RATE: 15
SET MECH RESP RATE: 16
SODIUM SERPL-SCNC: 136 MMOL/L (ref 136–145)
SODIUM SERPL-SCNC: 137 MMOL/L (ref 136–145)
SODIUM SERPL-SCNC: 139 MMOL/L (ref 136–145)
TOTAL RATE: 15 BREATHS/MINUTE
TOTAL RATE: 16 BREATHS/MINUTE
VENTILATOR MODE: ABNORMAL
VENTILATOR MODE: ABNORMAL
VT ON VENT VENT: 671 ML
VT ON VENT VENT: 746 ML
WBC NRBC COR # BLD: 11.35 10*3/MM3 (ref 3.4–10.8)
WBC NRBC COR # BLD: 9.05 10*3/MM3 (ref 3.4–10.8)

## 2023-01-10 PROCEDURE — 25010000002 FENTANYL CITRATE (PF) 250 MCG/5ML SOLUTION: Performed by: ANESTHESIOLOGY

## 2023-01-10 PROCEDURE — 25010000002 HEPARIN (PORCINE) PER 1000 UNITS

## 2023-01-10 PROCEDURE — 25010000002 MAGNESIUM SULFATE IN D5W 1G/100ML (PREMIX) 1-5 GM/100ML-% SOLUTION: Performed by: NURSE PRACTITIONER

## 2023-01-10 PROCEDURE — 25010000002 ALBUMIN HUMAN 25% PER 50 ML: Performed by: NURSE PRACTITIONER

## 2023-01-10 PROCEDURE — P9047 ALBUMIN (HUMAN), 25%, 50ML: HCPCS

## 2023-01-10 PROCEDURE — 25010000002 AMIODARONE PER 30 MG: Performed by: ANESTHESIOLOGY

## 2023-01-10 PROCEDURE — 25010000002 PAPAVERINE PER 60 MG

## 2023-01-10 PROCEDURE — 94761 N-INVAS EAR/PLS OXIMETRY MLT: CPT

## 2023-01-10 PROCEDURE — 0 MILRINONE LACTATE 10 MG/10ML SOLUTION 10 ML VIAL: Performed by: ANESTHESIOLOGY

## 2023-01-10 PROCEDURE — 33519 CABG ARTERY-VEIN THREE: CPT | Performed by: PHYSICIAN ASSISTANT

## 2023-01-10 PROCEDURE — 93005 ELECTROCARDIOGRAM TRACING: CPT | Performed by: NURSE PRACTITIONER

## 2023-01-10 PROCEDURE — 82803 BLOOD GASES ANY COMBINATION: CPT

## 2023-01-10 PROCEDURE — 25010000002 PROTAMINE SULFATE PER 10 MG: Performed by: ANESTHESIOLOGY

## 2023-01-10 PROCEDURE — 25010000002 PROPOFOL 10 MG/ML EMULSION: Performed by: ANESTHESIOLOGY

## 2023-01-10 PROCEDURE — 83735 ASSAY OF MAGNESIUM: CPT | Performed by: NURSE PRACTITIONER

## 2023-01-10 PROCEDURE — 85610 PROTHROMBIN TIME: CPT | Performed by: NURSE PRACTITIONER

## 2023-01-10 PROCEDURE — 25010000002 LORAZEPAM PER 2 MG: Performed by: ANESTHESIOLOGY

## 2023-01-10 PROCEDURE — 85018 HEMOGLOBIN: CPT

## 2023-01-10 PROCEDURE — 94799 UNLISTED PULMONARY SVC/PX: CPT

## 2023-01-10 PROCEDURE — 021209W BYPASS CORONARY ARTERY, THREE ARTERIES FROM AORTA WITH AUTOLOGOUS VENOUS TISSUE, OPEN APPROACH: ICD-10-PCS

## 2023-01-10 PROCEDURE — 85027 COMPLETE CBC AUTOMATED: CPT | Performed by: NURSE PRACTITIONER

## 2023-01-10 PROCEDURE — C1751 CATH, INF, PER/CENT/MIDLINE: HCPCS | Performed by: ANESTHESIOLOGY

## 2023-01-10 PROCEDURE — 85384 FIBRINOGEN ACTIVITY: CPT | Performed by: NURSE PRACTITIONER

## 2023-01-10 PROCEDURE — 94002 VENT MGMT INPAT INIT DAY: CPT

## 2023-01-10 PROCEDURE — 82330 ASSAY OF CALCIUM: CPT | Performed by: NURSE PRACTITIONER

## 2023-01-10 PROCEDURE — P9047 ALBUMIN (HUMAN), 25%, 50ML: HCPCS | Performed by: NURSE PRACTITIONER

## 2023-01-10 PROCEDURE — 33519 CABG ARTERY-VEIN THREE: CPT

## 2023-01-10 PROCEDURE — 85730 THROMBOPLASTIN TIME PARTIAL: CPT | Performed by: NURSE PRACTITIONER

## 2023-01-10 PROCEDURE — 25010000002 ALBUMIN HUMAN 25% PER 50 ML

## 2023-01-10 PROCEDURE — 25010000002 MAGNESIUM SULFATE PER 500 MG OF MAGNESIUM: Performed by: ANESTHESIOLOGY

## 2023-01-10 PROCEDURE — 33268 EXCL LAA OPN OTH PX ANY METH: CPT

## 2023-01-10 PROCEDURE — 85025 COMPLETE CBC W/AUTO DIFF WBC: CPT | Performed by: NURSE PRACTITIONER

## 2023-01-10 PROCEDURE — 25010000002 AMIODARONE IN DEXTROSE 5% 360-4.14 MG/200ML-% SOLUTION

## 2023-01-10 PROCEDURE — 25010000002 VANCOMYCIN 1 G RECONSTITUTED SOLUTION

## 2023-01-10 PROCEDURE — 63710000001 INSULIN REGULAR HUMAN PER 5 UNITS: Performed by: ANESTHESIOLOGY

## 2023-01-10 PROCEDURE — 25010000002 AMIODARONE IN DEXTROSE 5% 360-4.14 MG/200ML-% SOLUTION: Performed by: ANESTHESIOLOGY

## 2023-01-10 PROCEDURE — 25010000002 CEFAZOLIN IN DEXTROSE 2-4 GM/100ML-% SOLUTION: Performed by: NURSE PRACTITIONER

## 2023-01-10 PROCEDURE — 25010000002 HEPARIN (PORCINE) PER 1000 UNITS: Performed by: ANESTHESIOLOGY

## 2023-01-10 PROCEDURE — 93318 ECHO TRANSESOPHAGEAL INTRAOP: CPT | Performed by: ANESTHESIOLOGY

## 2023-01-10 PROCEDURE — 82962 GLUCOSE BLOOD TEST: CPT

## 2023-01-10 PROCEDURE — 25010000002 PHENYLEPHRINE 10 MG/ML SOLUTION 5 ML VIAL: Performed by: ANESTHESIOLOGY

## 2023-01-10 PROCEDURE — 80069 RENAL FUNCTION PANEL: CPT | Performed by: NURSE PRACTITIONER

## 2023-01-10 PROCEDURE — 33508 ENDOSCOPIC VEIN HARVEST: CPT | Performed by: PHYSICIAN ASSISTANT

## 2023-01-10 PROCEDURE — 25010000002 NEOSTIGMINE 5 MG/10ML SOLUTION: Performed by: ANESTHESIOLOGY

## 2023-01-10 PROCEDURE — 85347 COAGULATION TIME ACTIVATED: CPT

## 2023-01-10 PROCEDURE — 33533 CABG ARTERIAL SINGLE: CPT | Performed by: PHYSICIAN ASSISTANT

## 2023-01-10 PROCEDURE — 71045 X-RAY EXAM CHEST 1 VIEW: CPT

## 2023-01-10 PROCEDURE — 25010000002 MIDAZOLAM PER 1 MG: Performed by: ANESTHESIOLOGY

## 2023-01-10 PROCEDURE — 5A1221Z PERFORMANCE OF CARDIAC OUTPUT, CONTINUOUS: ICD-10-PCS

## 2023-01-10 PROCEDURE — 94760 N-INVAS EAR/PLS OXIMETRY 1: CPT

## 2023-01-10 PROCEDURE — 33508 ENDOSCOPIC VEIN HARVEST: CPT

## 2023-01-10 PROCEDURE — C1713 ANCHOR/SCREW BN/BN,TIS/BN: HCPCS

## 2023-01-10 PROCEDURE — 82947 ASSAY GLUCOSE BLOOD QUANT: CPT

## 2023-01-10 PROCEDURE — 02100Z9 BYPASS CORONARY ARTERY, ONE ARTERY FROM LEFT INTERNAL MAMMARY, OPEN APPROACH: ICD-10-PCS

## 2023-01-10 PROCEDURE — 0 MILRINONE LACTATE IN DEXTROSE 20-5 MG/100ML-% SOLUTION: Performed by: NURSE PRACTITIONER

## 2023-01-10 PROCEDURE — 33533 CABG ARTERIAL SINGLE: CPT

## 2023-01-10 PROCEDURE — C1729 CATH, DRAINAGE: HCPCS

## 2023-01-10 PROCEDURE — 85014 HEMATOCRIT: CPT

## 2023-01-10 PROCEDURE — 06BQ4ZZ EXCISION OF LEFT SAPHENOUS VEIN, PERCUTANEOUS ENDOSCOPIC APPROACH: ICD-10-PCS

## 2023-01-10 PROCEDURE — 33268 EXCL LAA OPN OTH PX ANY METH: CPT | Performed by: PHYSICIAN ASSISTANT

## 2023-01-10 PROCEDURE — 80048 BASIC METABOLIC PNL TOTAL CA: CPT | Performed by: NURSE PRACTITIONER

## 2023-01-10 PROCEDURE — 02L70ZK OCCLUSION OF LEFT ATRIAL APPENDAGE, OPEN APPROACH: ICD-10-PCS

## 2023-01-10 PROCEDURE — A4648 IMPLANTABLE TISSUE MARKER: HCPCS

## 2023-01-10 DEVICE — APPL CLIP LAA ATRICLIP FLX 40MM: Type: IMPLANTABLE DEVICE | Site: STERNUM | Status: FUNCTIONAL

## 2023-01-10 DEVICE — SS SUTURE, 3 PER SLEEVE
Type: IMPLANTABLE DEVICE | Site: STERNUM | Status: FUNCTIONAL
Brand: MYO/WIRE II

## 2023-01-10 DEVICE — SS SUTURE, 4 PER SLEEVE
Type: IMPLANTABLE DEVICE | Site: STERNUM | Status: FUNCTIONAL
Brand: MYO/WIRE II

## 2023-01-10 RX ORDER — SODIUM CHLORIDE 9 MG/ML
400 INJECTION, SOLUTION INTRAVENOUS ONCE
Status: COMPLETED | OUTPATIENT
Start: 2023-01-10 | End: 2023-01-10

## 2023-01-10 RX ORDER — MIDAZOLAM HYDROCHLORIDE 1 MG/ML
2 INJECTION INTRAMUSCULAR; INTRAVENOUS
Status: DISCONTINUED | OUTPATIENT
Start: 2023-01-10 | End: 2023-01-11

## 2023-01-10 RX ORDER — MAGNESIUM SULFATE 1 G/100ML
1 INJECTION INTRAVENOUS EVERY 8 HOURS
Status: COMPLETED | OUTPATIENT
Start: 2023-01-10 | End: 2023-01-11

## 2023-01-10 RX ORDER — MORPHINE SULFATE 2 MG/ML
1 INJECTION, SOLUTION INTRAMUSCULAR; INTRAVENOUS EVERY 4 HOURS PRN
Status: DISCONTINUED | OUTPATIENT
Start: 2023-01-10 | End: 2023-01-11

## 2023-01-10 RX ORDER — MILRINONE LACTATE 0.2 MG/ML
.25-.375 INJECTION, SOLUTION INTRAVENOUS CONTINUOUS PRN
Status: DISCONTINUED | OUTPATIENT
Start: 2023-01-10 | End: 2023-01-11

## 2023-01-10 RX ORDER — BISACODYL 5 MG/1
10 TABLET, DELAYED RELEASE ORAL DAILY PRN
Status: DISCONTINUED | OUTPATIENT
Start: 2023-01-10 | End: 2023-01-16 | Stop reason: HOSPADM

## 2023-01-10 RX ORDER — HEPARIN SODIUM 1000 [USP'U]/ML
INJECTION, SOLUTION INTRAVENOUS; SUBCUTANEOUS AS NEEDED
Status: DISCONTINUED | OUTPATIENT
Start: 2023-01-10 | End: 2023-01-10 | Stop reason: SURG

## 2023-01-10 RX ORDER — ACETAMINOPHEN 160 MG/5ML
650 SOLUTION ORAL EVERY 4 HOURS
Status: DISCONTINUED | OUTPATIENT
Start: 2023-01-10 | End: 2023-01-11

## 2023-01-10 RX ORDER — MAGNESIUM HYDROXIDE 1200 MG/15ML
LIQUID ORAL AS NEEDED
Status: DISCONTINUED | OUTPATIENT
Start: 2023-01-10 | End: 2023-01-10 | Stop reason: HOSPADM

## 2023-01-10 RX ORDER — DOPAMINE HYDROCHLORIDE 160 MG/100ML
2-20 INJECTION, SOLUTION INTRAVENOUS CONTINUOUS PRN
Status: DISCONTINUED | OUTPATIENT
Start: 2023-01-10 | End: 2023-01-11

## 2023-01-10 RX ORDER — NITROGLYCERIN 20 MG/100ML
5-200 INJECTION INTRAVENOUS
Status: DISCONTINUED | OUTPATIENT
Start: 2023-01-10 | End: 2023-01-11

## 2023-01-10 RX ORDER — PROTAMINE SULFATE 10 MG/ML
INJECTION, SOLUTION INTRAVENOUS AS NEEDED
Status: DISCONTINUED | OUTPATIENT
Start: 2023-01-10 | End: 2023-01-10 | Stop reason: SURG

## 2023-01-10 RX ORDER — PANTOPRAZOLE SODIUM 40 MG/10ML
40 INJECTION, POWDER, LYOPHILIZED, FOR SOLUTION INTRAVENOUS ONCE
Status: COMPLETED | OUTPATIENT
Start: 2023-01-10 | End: 2023-01-10

## 2023-01-10 RX ORDER — POTASSIUM CHLORIDE 29.8 MG/ML
20 INJECTION INTRAVENOUS
Status: DISCONTINUED | OUTPATIENT
Start: 2023-01-10 | End: 2023-01-12

## 2023-01-10 RX ORDER — MORPHINE SULFATE 2 MG/ML
4 INJECTION, SOLUTION INTRAMUSCULAR; INTRAVENOUS
Status: DISCONTINUED | OUTPATIENT
Start: 2023-01-10 | End: 2023-01-11

## 2023-01-10 RX ORDER — ACETAMINOPHEN 325 MG/1
650 TABLET ORAL EVERY 4 HOURS
Status: DISCONTINUED | OUTPATIENT
Start: 2023-01-10 | End: 2023-01-11

## 2023-01-10 RX ORDER — ACETAMINOPHEN 650 MG/1
650 SUPPOSITORY RECTAL EVERY 4 HOURS
Status: DISCONTINUED | OUTPATIENT
Start: 2023-01-10 | End: 2023-01-11

## 2023-01-10 RX ORDER — CHLORHEXIDINE GLUCONATE 0.12 MG/ML
15 RINSE ORAL EVERY 12 HOURS SCHEDULED
Status: DISCONTINUED | OUTPATIENT
Start: 2023-01-10 | End: 2023-01-15

## 2023-01-10 RX ORDER — NICOTINE POLACRILEX 4 MG
15 LOZENGE BUCCAL
Status: DISCONTINUED | OUTPATIENT
Start: 2023-01-10 | End: 2023-01-11

## 2023-01-10 RX ORDER — ACETAMINOPHEN 650 MG/1
650 SUPPOSITORY RECTAL EVERY 4 HOURS PRN
Status: DISCONTINUED | OUTPATIENT
Start: 2023-01-11 | End: 2023-01-16 | Stop reason: HOSPADM

## 2023-01-10 RX ORDER — POTASSIUM CHLORIDE 750 MG/1
40 TABLET, FILM COATED, EXTENDED RELEASE ORAL AS NEEDED
Status: DISCONTINUED | OUTPATIENT
Start: 2023-01-10 | End: 2023-01-16 | Stop reason: HOSPADM

## 2023-01-10 RX ORDER — NALOXONE HCL 0.4 MG/ML
0.4 VIAL (ML) INJECTION
Status: DISCONTINUED | OUTPATIENT
Start: 2023-01-10 | End: 2023-01-16 | Stop reason: HOSPADM

## 2023-01-10 RX ORDER — MIDAZOLAM HYDROCHLORIDE 1 MG/ML
INJECTION INTRAMUSCULAR; INTRAVENOUS AS NEEDED
Status: DISCONTINUED | OUTPATIENT
Start: 2023-01-10 | End: 2023-01-10 | Stop reason: SURG

## 2023-01-10 RX ORDER — OXYCODONE HYDROCHLORIDE 5 MG/1
10 TABLET ORAL EVERY 4 HOURS PRN
Status: DISCONTINUED | OUTPATIENT
Start: 2023-01-10 | End: 2023-01-16 | Stop reason: HOSPADM

## 2023-01-10 RX ORDER — FENTANYL CITRATE 50 UG/ML
INJECTION, SOLUTION INTRAMUSCULAR; INTRAVENOUS AS NEEDED
Status: DISCONTINUED | OUTPATIENT
Start: 2023-01-10 | End: 2023-01-10 | Stop reason: SURG

## 2023-01-10 RX ORDER — NICARDIPINE HYDROCHLORIDE 2.5 MG/ML
INJECTION INTRAVENOUS AS NEEDED
Status: DISCONTINUED | OUTPATIENT
Start: 2023-01-10 | End: 2023-01-10 | Stop reason: SURG

## 2023-01-10 RX ORDER — NOREPINEPHRINE BIT/0.9 % NACL 8 MG/250ML
.02-.2 INFUSION BOTTLE (ML) INTRAVENOUS CONTINUOUS PRN
Status: DISCONTINUED | OUTPATIENT
Start: 2023-01-10 | End: 2023-01-11

## 2023-01-10 RX ORDER — NEOSTIGMINE METHYLSULFATE 0.5 MG/ML
INJECTION, SOLUTION INTRAVENOUS AS NEEDED
Status: DISCONTINUED | OUTPATIENT
Start: 2023-01-10 | End: 2023-01-10 | Stop reason: SURG

## 2023-01-10 RX ORDER — CEFAZOLIN SODIUM 2 G/100ML
2 INJECTION, SOLUTION INTRAVENOUS EVERY 8 HOURS
Status: COMPLETED | OUTPATIENT
Start: 2023-01-10 | End: 2023-01-12

## 2023-01-10 RX ORDER — AMOXICILLIN 250 MG
2 CAPSULE ORAL NIGHTLY
Status: DISCONTINUED | OUTPATIENT
Start: 2023-01-11 | End: 2023-01-16 | Stop reason: HOSPADM

## 2023-01-10 RX ORDER — ROCURONIUM BROMIDE 10 MG/ML
INJECTION, SOLUTION INTRAVENOUS AS NEEDED
Status: DISCONTINUED | OUTPATIENT
Start: 2023-01-10 | End: 2023-01-10 | Stop reason: SURG

## 2023-01-10 RX ORDER — CALCIUM CHLORIDE 100 MG/ML
INJECTION INTRAVENOUS; INTRAVENTRICULAR AS NEEDED
Status: DISCONTINUED | OUTPATIENT
Start: 2023-01-10 | End: 2023-01-10 | Stop reason: SURG

## 2023-01-10 RX ORDER — PANTOPRAZOLE SODIUM 40 MG/1
40 TABLET, DELAYED RELEASE ORAL EVERY MORNING
Status: DISCONTINUED | OUTPATIENT
Start: 2023-01-11 | End: 2023-01-16 | Stop reason: HOSPADM

## 2023-01-10 RX ORDER — ONDANSETRON 2 MG/ML
4 INJECTION INTRAMUSCULAR; INTRAVENOUS EVERY 6 HOURS PRN
Status: DISCONTINUED | OUTPATIENT
Start: 2023-01-10 | End: 2023-01-16 | Stop reason: HOSPADM

## 2023-01-10 RX ORDER — ACETAMINOPHEN 10 MG/ML
1000 INJECTION, SOLUTION INTRAVENOUS EVERY 8 HOURS
Status: COMPLETED | OUTPATIENT
Start: 2023-01-10 | End: 2023-01-11

## 2023-01-10 RX ORDER — LIDOCAINE HYDROCHLORIDE 20 MG/ML
INJECTION, SOLUTION EPIDURAL; INFILTRATION; INTRACAUDAL; PERINEURAL AS NEEDED
Status: DISCONTINUED | OUTPATIENT
Start: 2023-01-10 | End: 2023-01-10 | Stop reason: SURG

## 2023-01-10 RX ORDER — PROPOFOL 10 MG/ML
VIAL (ML) INTRAVENOUS AS NEEDED
Status: DISCONTINUED | OUTPATIENT
Start: 2023-01-10 | End: 2023-01-10 | Stop reason: SURG

## 2023-01-10 RX ORDER — POTASSIUM CHLORIDE 7.45 MG/ML
10 INJECTION INTRAVENOUS
Status: DISCONTINUED | OUTPATIENT
Start: 2023-01-10 | End: 2023-01-16 | Stop reason: HOSPADM

## 2023-01-10 RX ORDER — MEPERIDINE HYDROCHLORIDE 25 MG/ML
25 INJECTION INTRAMUSCULAR; INTRAVENOUS; SUBCUTANEOUS EVERY 4 HOURS PRN
Status: ACTIVE | OUTPATIENT
Start: 2023-01-10 | End: 2023-01-10

## 2023-01-10 RX ORDER — ALBUMIN (HUMAN) 12.5 G/50ML
25 SOLUTION INTRAVENOUS ONCE
Status: COMPLETED | OUTPATIENT
Start: 2023-01-10 | End: 2023-01-10

## 2023-01-10 RX ORDER — DEXTROSE MONOHYDRATE 25 G/50ML
10-50 INJECTION, SOLUTION INTRAVENOUS
Status: DISCONTINUED | OUTPATIENT
Start: 2023-01-10 | End: 2023-01-11

## 2023-01-10 RX ORDER — METOCLOPRAMIDE HYDROCHLORIDE 5 MG/ML
10 INJECTION INTRAMUSCULAR; INTRAVENOUS EVERY 6 HOURS
Status: DISPENSED | OUTPATIENT
Start: 2023-01-10 | End: 2023-01-11

## 2023-01-10 RX ORDER — ALPRAZOLAM 0.25 MG/1
0.25 TABLET ORAL EVERY 8 HOURS PRN
Status: DISCONTINUED | OUTPATIENT
Start: 2023-01-10 | End: 2023-01-16 | Stop reason: HOSPADM

## 2023-01-10 RX ORDER — NOREPINEPHRINE BITARTRATE 1 MG/ML
INJECTION, SOLUTION INTRAVENOUS CONTINUOUS PRN
Status: DISCONTINUED | OUTPATIENT
Start: 2023-01-10 | End: 2023-01-10 | Stop reason: SURG

## 2023-01-10 RX ORDER — PHENYLEPHRINE HCL IN 0.9% NACL 1 MG/10 ML
SYRINGE (ML) INTRAVENOUS AS NEEDED
Status: DISCONTINUED | OUTPATIENT
Start: 2023-01-10 | End: 2023-01-10 | Stop reason: SURG

## 2023-01-10 RX ORDER — ASPIRIN 81 MG/1
81 TABLET ORAL DAILY
Status: DISCONTINUED | OUTPATIENT
Start: 2023-01-11 | End: 2023-01-16 | Stop reason: HOSPADM

## 2023-01-10 RX ORDER — BISACODYL 10 MG
10 SUPPOSITORY, RECTAL RECTAL DAILY PRN
Status: DISCONTINUED | OUTPATIENT
Start: 2023-01-11 | End: 2023-01-16 | Stop reason: HOSPADM

## 2023-01-10 RX ORDER — DEXMEDETOMIDINE HYDROCHLORIDE 4 UG/ML
.2-1.5 INJECTION, SOLUTION INTRAVENOUS
Status: DISCONTINUED | OUTPATIENT
Start: 2023-01-10 | End: 2023-01-11

## 2023-01-10 RX ORDER — POTASSIUM CHLORIDE 1.5 G/1.77G
40 POWDER, FOR SOLUTION ORAL AS NEEDED
Status: DISCONTINUED | OUTPATIENT
Start: 2023-01-10 | End: 2023-01-16 | Stop reason: HOSPADM

## 2023-01-10 RX ORDER — ATORVASTATIN CALCIUM 20 MG/1
40 TABLET, FILM COATED ORAL NIGHTLY
Status: DISCONTINUED | OUTPATIENT
Start: 2023-01-10 | End: 2023-01-11

## 2023-01-10 RX ORDER — PHENYLEPHRINE HCL IN 0.9% NACL 0.5 MG/5ML
.2-2 SYRINGE (ML) INTRAVENOUS CONTINUOUS PRN
Status: DISCONTINUED | OUTPATIENT
Start: 2023-01-10 | End: 2023-01-11

## 2023-01-10 RX ORDER — ENOXAPARIN SODIUM 100 MG/ML
40 INJECTION SUBCUTANEOUS EVERY 24 HOURS
Status: DISCONTINUED | OUTPATIENT
Start: 2023-01-11 | End: 2023-01-15

## 2023-01-10 RX ORDER — ACETAMINOPHEN 325 MG/1
650 TABLET ORAL EVERY 4 HOURS PRN
Status: DISCONTINUED | OUTPATIENT
Start: 2023-01-11 | End: 2023-01-16 | Stop reason: HOSPADM

## 2023-01-10 RX ORDER — HYDROCODONE BITARTRATE AND ACETAMINOPHEN 5; 325 MG/1; MG/1
2 TABLET ORAL EVERY 4 HOURS PRN
Status: DISCONTINUED | OUTPATIENT
Start: 2023-01-10 | End: 2023-01-16 | Stop reason: HOSPADM

## 2023-01-10 RX ORDER — PAPAVERINE HYDROCHLORIDE 30 MG/ML
INJECTION INTRAMUSCULAR; INTRAVENOUS AS NEEDED
Status: DISCONTINUED | OUTPATIENT
Start: 2023-01-10 | End: 2023-01-10 | Stop reason: HOSPADM

## 2023-01-10 RX ORDER — AMIODARONE HYDROCHLORIDE 50 MG/ML
INJECTION, SOLUTION INTRAVENOUS AS NEEDED
Status: DISCONTINUED | OUTPATIENT
Start: 2023-01-10 | End: 2023-01-10 | Stop reason: SURG

## 2023-01-10 RX ORDER — GLYCOPYRROLATE 0.2 MG/ML
INJECTION INTRAMUSCULAR; INTRAVENOUS AS NEEDED
Status: DISCONTINUED | OUTPATIENT
Start: 2023-01-10 | End: 2023-01-10 | Stop reason: SURG

## 2023-01-10 RX ORDER — SODIUM CHLORIDE 9 MG/ML
INJECTION, SOLUTION INTRAVENOUS CONTINUOUS PRN
Status: DISCONTINUED | OUTPATIENT
Start: 2023-01-10 | End: 2023-01-10 | Stop reason: SURG

## 2023-01-10 RX ORDER — ACETAMINOPHEN 160 MG/5ML
650 SOLUTION ORAL EVERY 4 HOURS PRN
Status: DISCONTINUED | OUTPATIENT
Start: 2023-01-11 | End: 2023-01-16 | Stop reason: HOSPADM

## 2023-01-10 RX ORDER — MAGNESIUM SULFATE HEPTAHYDRATE 500 MG/ML
INJECTION, SOLUTION INTRAMUSCULAR; INTRAVENOUS AS NEEDED
Status: DISCONTINUED | OUTPATIENT
Start: 2023-01-10 | End: 2023-01-10 | Stop reason: SURG

## 2023-01-10 RX ORDER — POLYETHYLENE GLYCOL 3350 17 G/17G
17 POWDER, FOR SOLUTION ORAL DAILY PRN
Status: DISCONTINUED | OUTPATIENT
Start: 2023-01-10 | End: 2023-01-13

## 2023-01-10 RX ORDER — AMINOCAPROIC ACID 250 MG/ML
INJECTION, SOLUTION INTRAVENOUS AS NEEDED
Status: DISCONTINUED | OUTPATIENT
Start: 2023-01-10 | End: 2023-01-10 | Stop reason: SURG

## 2023-01-10 RX ORDER — CYCLOBENZAPRINE HCL 10 MG
10 TABLET ORAL EVERY 8 HOURS PRN
Status: DISCONTINUED | OUTPATIENT
Start: 2023-01-11 | End: 2023-01-16 | Stop reason: HOSPADM

## 2023-01-10 RX ORDER — LIDOCAINE HYDROCHLORIDE 20 MG/ML
INJECTION, SOLUTION INTRAVENOUS AS NEEDED
Status: DISCONTINUED | OUTPATIENT
Start: 2023-01-10 | End: 2023-01-10 | Stop reason: SURG

## 2023-01-10 RX ORDER — SODIUM CHLORIDE 9 MG/ML
30 INJECTION, SOLUTION INTRAVENOUS CONTINUOUS
Status: DISCONTINUED | OUTPATIENT
Start: 2023-01-10 | End: 2023-01-11

## 2023-01-10 RX ADMIN — NOREPINEPHRINE BITARTRATE 0.03 MCG/KG/MIN: 1 INJECTION, SOLUTION, CONCENTRATE INTRAVENOUS at 10:18

## 2023-01-10 RX ADMIN — SODIUM CHLORIDE: 9 INJECTION, SOLUTION INTRAVENOUS at 06:37

## 2023-01-10 RX ADMIN — FENTANYL CITRATE 100 MCG: 50 INJECTION, SOLUTION INTRAMUSCULAR; INTRAVENOUS at 10:53

## 2023-01-10 RX ADMIN — DEXMEDETOMIDINE HYDROCHLORIDE 0.5 MCG/KG/HR: 4 INJECTION, SOLUTION INTRAVENOUS at 13:28

## 2023-01-10 RX ADMIN — FENTANYL CITRATE 100 MCG: 50 INJECTION, SOLUTION INTRAMUSCULAR; INTRAVENOUS at 11:28

## 2023-01-10 RX ADMIN — SODIUM CHLORIDE 400 ML: 9 INJECTION, SOLUTION INTRAVENOUS at 15:24

## 2023-01-10 RX ADMIN — MUPIROCIN 1 APPLICATION: 20 OINTMENT TOPICAL at 18:16

## 2023-01-10 RX ADMIN — CHLORHEXIDINE GLUCONATE 15 ML: 1.2 SOLUTION ORAL at 05:58

## 2023-01-10 RX ADMIN — DEXMEDETOMIDINE 0.5 MCG/KG/HR: 100 INJECTION, SOLUTION, CONCENTRATE INTRAVENOUS at 07:17

## 2023-01-10 RX ADMIN — LIDOCAINE HYDROCHLORIDE 100 MG: 20 INJECTION, SOLUTION EPIDURAL; INFILTRATION; INTRACAUDAL; PERINEURAL at 06:52

## 2023-01-10 RX ADMIN — CALCIUM CHLORIDE 250 MG: 100 INJECTION, SOLUTION INTRAVENOUS; INTRAVENTRICULAR at 10:35

## 2023-01-10 RX ADMIN — Medication 50 MCG: at 06:52

## 2023-01-10 RX ADMIN — AMIODARONE HYDROCHLORIDE 150 MG: 50 INJECTION, SOLUTION INTRAVENOUS at 10:20

## 2023-01-10 RX ADMIN — ROCURONIUM BROMIDE 20 MG: 10 INJECTION, SOLUTION INTRAVENOUS at 10:29

## 2023-01-10 RX ADMIN — Medication 100 MCG: at 09:00

## 2023-01-10 RX ADMIN — AMINOCAPROIC ACID 10 G: 250 INJECTION, SOLUTION INTRAVENOUS at 08:38

## 2023-01-10 RX ADMIN — CEFAZOLIN SODIUM 2 G: 2 INJECTION, SOLUTION INTRAVENOUS at 07:08

## 2023-01-10 RX ADMIN — FENTANYL CITRATE 100 MCG: 50 INJECTION, SOLUTION INTRAMUSCULAR; INTRAVENOUS at 07:50

## 2023-01-10 RX ADMIN — SODIUM CHLORIDE 0.12 MCG/KG/MIN: 0.9 INJECTION, SOLUTION INTRAVENOUS at 10:41

## 2023-01-10 RX ADMIN — GLYCOPYRROLATE 0.7 MCG: 1 INJECTION INTRAMUSCULAR; INTRAVENOUS at 11:50

## 2023-01-10 RX ADMIN — NICARDIPINE HYDROCHLORIDE 0.4 MG: 2.5 INJECTION, SOLUTION INTRAVENOUS at 08:02

## 2023-01-10 RX ADMIN — ROCURONIUM BROMIDE 30 MG: 10 INJECTION, SOLUTION INTRAVENOUS at 09:00

## 2023-01-10 RX ADMIN — MUPIROCIN 1 APPLICATION: 20 OINTMENT TOPICAL at 05:58

## 2023-01-10 RX ADMIN — CEFAZOLIN SODIUM 2 G: 2 INJECTION, SOLUTION INTRAVENOUS at 10:35

## 2023-01-10 RX ADMIN — ACETAMINOPHEN 1000 MG: 10 INJECTION, SOLUTION INTRAVENOUS at 12:42

## 2023-01-10 RX ADMIN — PROTAMINE SULFATE 300 MG: 10 INJECTION, SOLUTION INTRAVENOUS at 10:36

## 2023-01-10 RX ADMIN — SODIUM CHLORIDE 1.9 UNITS/HR: 900 INJECTION, SOLUTION INTRAVENOUS at 08:04

## 2023-01-10 RX ADMIN — ALBUMIN (HUMAN) 25 G: 0.25 INJECTION, SOLUTION INTRAVENOUS at 15:24

## 2023-01-10 RX ADMIN — PHENYLEPHRINE HYDROCHLORIDE 0.5 MCG/KG/MIN: 10 INJECTION INTRAVENOUS at 06:58

## 2023-01-10 RX ADMIN — OXYCODONE HYDROCHLORIDE 10 MG: 5 TABLET ORAL at 19:57

## 2023-01-10 RX ADMIN — PANTOPRAZOLE SODIUM 40 MG: 40 INJECTION, POWDER, FOR SOLUTION INTRAVENOUS at 12:42

## 2023-01-10 RX ADMIN — MIDAZOLAM 2 MG: 1 INJECTION INTRAMUSCULAR; INTRAVENOUS at 06:40

## 2023-01-10 RX ADMIN — ALBUMIN (HUMAN) 25 G: 0.25 INJECTION, SOLUTION INTRAVENOUS at 12:45

## 2023-01-10 RX ADMIN — LORAZEPAM 1 MG: 2 INJECTION INTRAMUSCULAR; INTRAVENOUS at 05:58

## 2023-01-10 RX ADMIN — METOPROLOL TARTRATE 12.5 MG: 25 TABLET, FILM COATED ORAL at 05:58

## 2023-01-10 RX ADMIN — MAGNESIUM SULFATE IN DEXTROSE 1 G: 10 INJECTION, SOLUTION INTRAVENOUS at 13:19

## 2023-01-10 RX ADMIN — NICARDIPINE HYDROCHLORIDE 0.6 MG: 2.5 INJECTION, SOLUTION INTRAVENOUS at 08:56

## 2023-01-10 RX ADMIN — FAMOTIDINE 20 MG: 10 INJECTION INTRAVENOUS at 05:58

## 2023-01-10 RX ADMIN — CALCIUM CHLORIDE 250 MG: 100 INJECTION, SOLUTION INTRAVENOUS; INTRAVENTRICULAR at 11:18

## 2023-01-10 RX ADMIN — PROPOFOL 25 MCG/KG/MIN: 10 INJECTION, EMULSION INTRAVENOUS at 07:17

## 2023-01-10 RX ADMIN — SODIUM CHLORIDE 400 ML: 9 INJECTION, SOLUTION INTRAVENOUS at 12:45

## 2023-01-10 RX ADMIN — NICARDIPINE HYDROCHLORIDE 0.4 MG: 2.5 INJECTION, SOLUTION INTRAVENOUS at 08:54

## 2023-01-10 RX ADMIN — FENTANYL CITRATE 150 MCG: 50 INJECTION, SOLUTION INTRAMUSCULAR; INTRAVENOUS at 06:52

## 2023-01-10 RX ADMIN — FENTANYL CITRATE 50 MCG: 50 INJECTION, SOLUTION INTRAMUSCULAR; INTRAVENOUS at 11:23

## 2023-01-10 RX ADMIN — SODIUM CHLORIDE 30 ML/HR: 9 INJECTION, SOLUTION INTRAVENOUS at 12:31

## 2023-01-10 RX ADMIN — CEFAZOLIN SODIUM 2 G: 2 INJECTION, SOLUTION INTRAVENOUS at 18:16

## 2023-01-10 RX ADMIN — NEOSTIGMINE METHYLSULFATE 4 MG: 0.5 INJECTION INTRAVENOUS at 11:50

## 2023-01-10 RX ADMIN — NICARDIPINE HYDROCHLORIDE 0.4 MG: 2.5 INJECTION, SOLUTION INTRAVENOUS at 07:51

## 2023-01-10 RX ADMIN — AMIODARONE HYDROCHLORIDE 0.5 MG/MIN: 1.8 INJECTION, SOLUTION INTRAVENOUS at 22:35

## 2023-01-10 RX ADMIN — MILRINONE LACTATE IN DEXTROSE 0.25 MCG/KG/MIN: 200 INJECTION, SOLUTION INTRAVENOUS at 22:32

## 2023-01-10 RX ADMIN — ATORVASTATIN CALCIUM 40 MG: 20 TABLET, FILM COATED ORAL at 21:36

## 2023-01-10 RX ADMIN — CHLORHEXIDINE GLUCONATE 15 ML: 1.2 SOLUTION ORAL at 18:16

## 2023-01-10 RX ADMIN — ROCURONIUM BROMIDE 100 MG: 10 INJECTION, SOLUTION INTRAVENOUS at 06:52

## 2023-01-10 RX ADMIN — CALCIUM CHLORIDE 250 MG: 100 INJECTION, SOLUTION INTRAVENOUS; INTRAVENTRICULAR at 10:45

## 2023-01-10 RX ADMIN — AMIODARONE HYDROCHLORIDE 1 MG/MIN: 1.8 INJECTION, SOLUTION INTRAVENOUS at 16:37

## 2023-01-10 RX ADMIN — AMIODARONE HYDROCHLORIDE 1 MG/MIN: 1.8 INJECTION, SOLUTION INTRAVENOUS at 10:20

## 2023-01-10 RX ADMIN — NICARDIPINE HYDROCHLORIDE 5 MG/HR: 2.5 INJECTION, SOLUTION INTRAVENOUS at 07:52

## 2023-01-10 RX ADMIN — LIDOCAINE HYDROCHLORIDE 100 MG: 20 INJECTION, SOLUTION INTRAVENOUS at 10:21

## 2023-01-10 RX ADMIN — MAGNESIUM SULFATE IN DEXTROSE 1 G: 10 INJECTION, SOLUTION INTRAVENOUS at 21:36

## 2023-01-10 RX ADMIN — MAGNESIUM SULFATE HEPTAHYDRATE 2 G: 500 INJECTION, SOLUTION INTRAMUSCULAR; INTRAVENOUS at 10:14

## 2023-01-10 RX ADMIN — PROPOFOL 60 MG: 10 INJECTION, EMULSION INTRAVENOUS at 06:52

## 2023-01-10 RX ADMIN — ACETAMINOPHEN 1000 MG: 10 INJECTION, SOLUTION INTRAVENOUS at 21:36

## 2023-01-10 RX ADMIN — HEPARIN SODIUM 30000 UNITS: 1000 INJECTION INTRAVENOUS; SUBCUTANEOUS at 08:38

## 2023-01-10 NOTE — ANESTHESIA PROCEDURE NOTES
Central Line      Patient reassessed immediately prior to procedure    Patient location during procedure: OR  Start time: 1/10/2023 7:03 AM  Stop Time:1/10/2023 7:10 AM  Indications: MD/Surgeon request, vascular access and central pressure monitoring  Staff  Anesthesiologist: Luzma St MD  Preanesthetic Checklist  Completed: patient identified, IV checked, site marked, risks and benefits discussed, surgical consent, monitors and equipment checked, pre-op evaluation and timeout performed  Central Line Prep  Sterile Tech:cap, gloves, gown, mask and sterile barriers  Prep: chloraprep  Patient monitoring: blood pressure monitoring, EKG and continuous pulse oximetry  Central Line Procedure  Laterality:right  Location:internal jugular  Catheter Type:Cordis (MAC Introducer)  Catheter Size:9 Fr  Guidance:ultrasound guided  PROCEDURE NOTE/ULTRASOUND INTERPRETATION.  Using ultrasound guidance the potential vascular sites for insertion of the catheter were visualized to determine the patency of the vessel to be used for vascular access.  After selecting the appropriate site for insertion, the needle was visualized under ultrasound being inserted into the internal jugular vein, followed by ultrasound confirmation of wire and catheter placement. There were no abnormalities seen on ultrasound; an image was taken; and the patient tolerated the procedure with no complications. Images: still images obtained, printed/placed on chart (image in CLAIR exam)  Assessment  Post procedure:biopatch applied, line sutured, occlusive dressing applied and secured with tape  Assessement:blood return through all ports, free fluid flow, chest x-ray ordered and Dev Test  Complications:no  Patient Tolerance:patient tolerated the procedure well with no apparent complications

## 2023-01-10 NOTE — ANESTHESIA PROCEDURE NOTES
Diagnostic IntraOp Anesthesia [facility]    Procedure Performed: Diagnostic IntraOp Anesthesia [facility]       Start Time:  1/10/2023 7:43 AM       End Time:   1/10/2023 7:59 AM    Preanesthesia Checklist:  Patient identified, IV assessed, risks and benefits discussed, monitors and equipment assessed, procedure being performed at surgeon's request and anesthesia consent obtained.    General Procedure Information  Diagnostic Indications for Echo:  assessment of ascending aorta, assessment of surgical repair, defect repair evaluation and hemodynamic monitoring  Physician Requesting Echo: Dima Velasquez MD  ICD Code for Medical Necessity:  Non rheumatic MR  Location performed:  OR  Intubated  Bite block placed  Heart visualized  Probe Insertion:  Easy  Probe Type:  Multiplane  Modalities:  Color flow mapping    Echocardiographic and Doppler Measurements    Ventricles    Right Ventricle:  Hypertrophy not present.  Thrombus not present.  Global function normal.    Left Ventricle:  Thrombus not present.  Global Function mildly impaired.  Ejection Fraction 50%.          Valves    Aortic Valve:  Stenosis not present.  Regurgitation trace.  Leaflets normal.  Leaflet motions normal and prolapsed.      Mitral Valve:  Stenosis not present.  Regurgitation trace.  Leaflets normal.  Leaflet motions normal.      Tricuspid Valve:  Stenosis not present.  Regurgitation absent.  Leaflet motions normal.    Pulmonic Valve:  Regurgitation trace.        Aorta    Ascending Aorta:  Size normal.  Diameter 3.8 cm.  Dissection not present.  Plaque thickness less than 3 mm.  Mobile plaque not present.    Aortic Arch:  Size normal.  Diameter 2.91 cm.  Dissection not present.  Plaque thickness less than 3 mm.  Mobile plaque not present.    Descending Aorta:  Size normal.  Diameter 2.43 cm.  Dissection not present.  Plaque thickness less than 3 mm.  Mobile plaque not present.        Atria    Right Atrium:  Size normal.  Spontaneous echo contrast not  present.  Thrombus not present.  Tumor not present.  Device present.    Left Atrium:  Size normal.  Spontaneous echo contrast present.  Thrombus not present.  Tumor not present.  Device not present.    Left atrial appendage normal.      Septa        Ventricular Septum:  Intra-ventricular septum morphology normal.          Other Findings  Pericardium:  normal  Pleural Effusion:  none  Pulmonary Arteries:  normal  Pulmonary Venous Flow:  normal    Anesthesia Information  Performed Personally  Anesthesiologist:  Luzma St MD      Echocardiogram Comments:       Poor transgastric windows    Post CPB:  Still with poor transgastric  From ME LVEF 50-55%, inferoseptal basal hypokinesis  Initially after separation, stunting of RV with moderate hypokinesis. Improved with 0.125 Primacor and reperfusion time  Aorta intact post decannulation

## 2023-01-10 NOTE — ANESTHESIA PROCEDURE NOTES
PA Catheter      Patient reassessed immediately prior to procedure    Patient location during procedure: OR  Start time: 1/10/2023 7:10 AM  Stop Time:1/10/2023 7:15 AM  Indications: central pressure monitoring  Staff  Anesthesiologist: Luzma St MD  Preanesthetic Checklist  Completed: patient identified, IV checked, site marked, risks and benefits discussed, surgical consent, monitors and equipment checked, pre-op evaluation and timeout performed  Central Line Prep  Sterile Tech:cap, gloves, gown, mask and sterile barriers  Prep: chloraprep  Patient monitoring: blood pressure monitoring, EKG and continuous pulse oximetry  Central Line Procedure  Laterality:right  Location:internal jugular  Catheter Type:[place]  Assessment  Complications:no  Patient Tolerance:patient tolerated the procedure well with no apparent complications

## 2023-01-10 NOTE — PROGRESS NOTES
Joshua Wolf   67 y.o.  male    LOS: 3 days   Patient Care Team:  Eric Eubanks MD as PCP - General (Family Medicine)  Jamir Hughes MD as Consulting Physician (Nephrology)      Subjective   Sedated  Interval History:     Patient Complaints:     Review of Systems:       Medication Review:   Current Facility-Administered Medications:   •  acetaminophen (OFIRMEV) injection 1,000 mg, 1,000 mg, Intravenous, Q8H, Alessandra Duarte APRN, 1,000 mg at 01/10/23 1242  •  acetaminophen (TYLENOL) tablet 650 mg, 650 mg, Oral, Q4H **OR** acetaminophen (TYLENOL) 160 MG/5ML solution 650 mg, 650 mg, Oral, Q4H **OR** acetaminophen (TYLENOL) suppository 650 mg, 650 mg, Rectal, Q4H, Alessandra Duarte APRN  •  [START ON 1/11/2023] acetaminophen (TYLENOL) tablet 650 mg, 650 mg, Oral, Q4H PRN **OR** [START ON 1/11/2023] acetaminophen (TYLENOL) 160 MG/5ML solution 650 mg, 650 mg, Oral, Q4H PRN **OR** [START ON 1/11/2023] acetaminophen (TYLENOL) suppository 650 mg, 650 mg, Rectal, Q4H PRN, Alessandra Duarte APRN  •  ALPRAZolam (XANAX) tablet 0.25 mg, 0.25 mg, Oral, Q8H PRN, Alessandra Duarte APRN  •  [START ON 1/11/2023] aspirin EC tablet 81 mg, 81 mg, Oral, Daily, Alessandra Duarte APRN  •  atorvastatin (LIPITOR) tablet 40 mg, 40 mg, Oral, Nightly, Alessandra Duarte APRN  •  bisacodyl (DULCOLAX) EC tablet 10 mg, 10 mg, Oral, Daily PRN, Alessandra Duarte APRN  •  [START ON 1/11/2023] bisacodyl (DULCOLAX) suppository 10 mg, 10 mg, Rectal, Daily PRN, Alessandra Duarte APRN  •  ceFAZolin in dextrose (ANCEF) IVPB solution 2 g, 2 g, Intravenous, Q8H, Alessandra Duarte APRN  •  chlorhexidine (PERIDEX) 0.12 % solution 15 mL, 15 mL, Mouth/Throat, Q12H, Alessandra Duarte APRN  •  clevidipine (CLEVIPREX) infusion 0.5 mg/mL, 2-32 mg/hr, Intravenous, Continuous PRN, Alessandra Duarte APRN  •  [START ON 1/11/2023] cyclobenzaprine (FLEXERIL) tablet 10 mg, 10 mg, Oral, Q8H PRN, Alessandra Duarte APRN  •  dexmedetomidine (PRECEDEX) 400 mcg in 100  mL NS infusion, 0.2-1.5 mcg/kg/hr, Intravenous, Titrated, Alessandra Duarte APRN, Last Rate: 8.4 mL/hr at 01/10/23 1553, 0.3 mcg/kg/hr at 01/10/23 1553  •  dextrose (D50W) (25 g/50 mL) IV injection 10-50 mL, 10-50 mL, Intravenous, Q15 Min PRN, Alessandra Duarte APRN  •  dextrose (GLUTOSE) oral gel 15 g, 15 g, Oral, Q15 Min PRN, Alessandra Duarte APRN  •  DOPamine 400 mg in 250 mL D5W infusion, 2-20 mcg/kg/min, Intravenous, Continuous PRN, Alessandra Duarte APRN  •  [START ON 1/11/2023] Enoxaparin Sodium (LOVENOX) syringe 40 mg, 40 mg, Subcutaneous, Q24H, Alessandra Duarte APRN  •  EPINEPHrine 5 mg in 250 mL NS infusion, 0.02-0.1 mcg/kg/min, Intravenous, Continuous PRN, Alessandra Duarte APRN  •  glucagon (human recombinant) (GLUCAGEN DIAGNOSTIC) injection 1 mg, 1 mg, Intramuscular, Q15 Min PRN, Alessandra Duarte APRN  •  HYDROcodone-acetaminophen (NORCO) 5-325 MG per tablet 2 tablet, 2 tablet, Oral, Q4H PRN, Alessandra Duarte APRN  •  insulin regular 1 unit/mL in 0.9% sodium chloride (Glucommander), 0-100 Units/hr, Intravenous, Titrated, Alessandra Duarte APRN, Last Rate: 2.2 mL/hr at 01/10/23 1448, 2.2 Units/hr at 01/10/23 1448  •  [START ON 1/11/2023] magnesium hydroxide (MILK OF MAGNESIA) suspension 10 mL, 10 mL, Oral, Daily PRN, Alessandra Duarte APRN  •  magnesium sulfate in D5W 1g/100mL (PREMIX), 1 g, Intravenous, Q8H, Alessandra Duarte APRN, 1 g at 01/10/23 1319  •  meperidine (DEMEROL) injection 25 mg, 25 mg, Intravenous, Q4H PRN, Alessandra Duarte APRN  •  metoclopramide (REGLAN) injection 10 mg, 10 mg, Intravenous, Q6H, Alessandra Duarte APRN  •  [START ON 1/11/2023] metoprolol tartrate (LOPRESSOR) tablet 12.5 mg, 12.5 mg, Oral, Q12H, Alessandra Duarte APRN  •  midazolam (VERSED) injection 2 mg, 2 mg, Intravenous, Q1H PRN, Alessandra Duarte APRN  •  milrinone (PRIMACOR) 20 mg in 100 mL D5W infusion, 0.25-0.375 mcg/kg/min, Intravenous, Continuous PRN, Alessandra Duarte APRN, Last Rate: 8.4 mL/hr at 01/10/23  1442, 0.25 mcg/kg/min at 01/10/23 1442  •  morphine injection 1 mg, 1 mg, Intravenous, Q4H PRN **AND** naloxone (NARCAN) injection 0.4 mg, 0.4 mg, Intravenous, Q5 Min PRN, Alessandra Duarte APRN  •  morphine injection 4 mg, 4 mg, Intravenous, Q30 Min PRN, Alessandra Duarte APRN  •  mupirocin (BACTROBAN) 2 % nasal ointment, , Each Nare, BID, Alessandra Duarte APRN  •  niCARdipine (CARDENE) 25 mg in 250 mL NS infusion kit, 5-15 mg/hr, Intravenous, Continuous PRN, Alessandra Duarte APRN  •  nitroglycerin (TRIDIL) 200 mcg/ml infusion, 5-200 mcg/min, Intravenous, Titrated, Alessandra Duarte APRN  •  norepinephrine (LEVOPHED) 8 mg in 250 mL NS infusion (premix), 0.02-0.2 mcg/kg/min, Intravenous, Continuous PRN, Alessandra Duarte APRN, Stopped at 01/10/23 1443  •  ondansetron (ZOFRAN) injection 4 mg, 4 mg, Intravenous, Q6H PRN, Alessandra Duarte APRN  •  oxyCODONE (ROXICODONE) immediate release tablet 10 mg, 10 mg, Oral, Q4H PRN, Alessandra Duarte APRN  •  [COMPLETED] pantoprazole (PROTONIX) injection 40 mg, 40 mg, Intravenous, Once, 40 mg at 01/10/23 1242 **FOLLOWED BY** [START ON 1/11/2023] pantoprazole (PROTONIX) EC tablet 40 mg, 40 mg, Oral, QAM, Alessandra Duarte APRN  •  phenylephrine (NUSRAT-SYNEPHRINE) 50 mg in 250 mL NS infusion, 0.2-2 mcg/kg/min, Intravenous, Continuous PRN, Alessandra uDarte APRN  •  polyethylene glycol (MIRALAX) packet 17 g, 17 g, Oral, Daily PRN, Alessandra Duarte APRN  •  potassium chloride (K-DUR,KLOR-CON) ER tablet 40 mEq, 40 mEq, Oral, PRN **OR** potassium chloride (KLOR-CON) packet 40 mEq, 40 mEq, Oral, PRN, Alessandra Duarte, APRN  •  potassium chloride 10 mEq in 100 mL IVPB, 10 mEq, Intravenous, Q1H PRN **OR** potassium chloride 10 mEq in 100 mL IVPB, 10 mEq, Intravenous, Q1H PRN, Alessandra Duarte, APRN  •  potassium chloride 20 mEq in 50 mL IVPB, 20 mEq, Intravenous, Q1H PRN, Alessandra Duarte, APRN  •  potassium chloride 20 mEq in 50 mL IVPB, 20 mEq, Intravenous, Q1H PRN, Alessandra Duarte,  BREANNE  •  potassium chloride 20 mEq in 50 mL IVPB, 20 mEq, Intravenous, Q1H PRN, Alessandra Duarte APRN  •  propofol (DIPRIVAN) infusion 10 mg/mL 100 mL, 5-50 mcg/kg/min, Intravenous, Continuous PRN, Alessandra Duarte APRN, Stopped at 01/10/23 1523  •  [START ON 1/11/2023] sennosides-docusate (PERICOLACE) 8.6-50 MG per tablet 2 tablet, 2 tablet, Oral, Nightly, Alessandra Duarte APRN  •  sodium chloride 0.9 % infusion, 30 mL/hr, Intravenous, Continuous, Alessandra Duarte APRN, Last Rate: 30 mL/hr at 01/10/23 1231, 30 mL/hr at 01/10/23 1231      Objective   Vital Sign Min/Max for last 24 hours  Temp  Min: 96.1 °F (35.6 °C)  Max: 98.1 °F (36.7 °C)   BP  Min: 94/66  Max: 131/85    Pulse  Min: 69  Max: 94     Wt Readings from Last 3 Encounters:   01/10/23 112 kg (247 lb 8 oz)   04/12/22 118 kg (260 lb 12.8 oz)   10/14/21 112 kg (246 lb)        Intake/Output Summary (Last 24 hours) at 1/10/2023 1558  Last data filed at 1/10/2023 1400  Gross per 24 hour   Intake 2045 ml   Output 1070 ml   Net 975 ml     Physical Exam:      General Appearance:    Well developed and well nourished in no acute distress   Head:    Normocephalic, atraumatic   Eyes:            Conjunctivae normal, anicteric, no xanthelasma   Neck:   supple, trachea midline, no thyromegaly, no carotid bruit, no JVD, no elevated CVP   Lungs:     Clear to auscultation,respirations regular, even and                  unlabored    Heart:    Regular rhythm and normal rate, normal S1 and S2,            No murmur, no gallop, no rub, no click   Chest Wall:    No abnormalities observed   Abdomen:     Normal bowel sounds, no masses, no organomegaly, soft        nontender, nondistended, no guarding, no rebound                tenderness   Rectal:     Deferred   Extremities:   No edema. Moves all extremities well, no cyanosis, no erythema   Pulses:   Pulses palpable and equal bilaterally   Skin:   No bleeding, bruising or rash   Neurologic:      :    Monitor:      Results  Review:         Sodium Sodium   Date Value Ref Range Status   01/10/2023 136 136 - 145 mmol/L Final   01/10/2023 137 136 - 145 mmol/L Final   01/08/2023 138 136 - 145 mmol/L Final      Potassium Potassium   Date Value Ref Range Status   01/10/2023 4.2 3.5 - 5.2 mmol/L Final     Comment:     Slight hemolysis detected by analyzer. Results may be affected.   01/10/2023 4.1 3.5 - 5.2 mmol/L Final     Comment:     Slight hemolysis detected by analyzer. Results may be affected.   01/08/2023 3.4 (L) 3.5 - 5.2 mmol/L Final      Chloride Chloride   Date Value Ref Range Status   01/10/2023 104 98 - 107 mmol/L Final   01/10/2023 102 98 - 107 mmol/L Final   01/08/2023 102 98 - 107 mmol/L Final      Bicarbonate No results found for: PLASMABICARB   BUN BUN   Date Value Ref Range Status   01/10/2023 23 8 - 23 mg/dL Final   01/10/2023 25 (H) 8 - 23 mg/dL Final   01/08/2023 22 8 - 23 mg/dL Final      Creatinine Creatinine   Date Value Ref Range Status   01/10/2023 1.37 (H) 0.76 - 1.27 mg/dL Final   01/10/2023 1.25 0.76 - 1.27 mg/dL Final   01/08/2023 1.21 0.76 - 1.27 mg/dL Final      Calcium Calcium   Date Value Ref Range Status   01/10/2023 10.0 8.6 - 10.5 mg/dL Final   01/10/2023 9.2 8.6 - 10.5 mg/dL Final   01/08/2023 8.8 8.6 - 10.5 mg/dL Final      Magnesium Magnesium   Date Value Ref Range Status   01/10/2023 2.8 (H) 1.6 - 2.4 mg/dL Final        Results from last 7 days   Lab Units 01/10/23  1158   WBC 10*3/mm3 11.35*   HEMOGLOBIN g/dL 13.2   HEMATOCRIT % 39.1   PLATELETS 10*3/mm3 102*     Lab Results   Lab Value Date/Time    TROPONINT 0.136 (C) 01/06/2023 0552    TROPONINT 0.164 (C) 01/06/2023 0024    TROPONINT 0.178 (C) 01/05/2023 1746    TROPONINT 0.143 (C) 01/05/2023 1508    TROPONINT <0.010 01/05/2023 0858    TROPONINT <0.010 10/30/2017 0114    TROPONINT <0.010 08/12/2016 0944    TROPONINT 0.246 (C) 07/19/2016 0404    TROPONINT <0.010 07/18/2016 0956            Echo EF Estimated  Lab Results   Component Value Date     ECHOEFEST 35 07/18/2016         Assessment/ Plan  Assessment & Plan   Active Hospital Problems    Diagnosis  POA   • **Chest pain [R07.9]  Yes   • Chest pain, unspecified type [R07.9]  Yes   • NSTEMI (non-ST elevated myocardial infarction) (HCC) [I21.4]  Unknown     Added automatically from request for surgery [id number]     • Abnormal findings on diagnostic imaging of heart and coronary circulation [R93.1]  Unknown     Added automatically from request for surgery [id number]         Chest pain, unspecified type    NSTEMI (non-ST elevated myocardial infarction) (HCC)    Abnormal findings on diagnostic imaging of heart and coronary circulation        A/P: 66 yo male with Known CAD with CP.     1. CP - unstable angina - in patient with previous PCI of LAD x 2 - repeat LHC showed diffuse disease distal to previous stents in the LAD, LCx has 80% OM1 and 70% OM2, with occlusion of OM3 distally, RCA with mid diffuse 80% disease. EF normal. Plan for surgical revascularization - patient does not want to proceed on Monday and is awaiting speaking to the surgeon.     2. HTN - controlled.     3. HLD - on Statin, treat to goal.     4. Atrial Fibrillation - paroxysmal, unclear how symptomatic - elevated [id number] score of at least 4 - should be on life long AC - not on AC as awaiting surgery - would recommend Amiodarone infusion around the time of surgery or in the postoperative setting as I surmise he will likely have post operative Atrial Fibrillation.   1. CABG x 4 (LIMA to LAD, SVG to ramus, SVG to OM, SVG to RCA)  2. EVH of the left legs  3. Left atrial appendage occlusion  Patient is in paced rhythm        Tyson Chun MD  01/10/23  15:58 EST

## 2023-01-10 NOTE — OP NOTE
Operative Note    Date of Dictation: 01/10/23    Date of Procedure: 01/10/23    Referring Physician: Joshua Mc, *    Preoperative diagnosis: Multivessel CAD and Attrial fibrillation    Postoperative diagnosis: Same    Procedure:   1. CABG x 4 (LIMA to LAD, SVG to ramus, SVG to OM, SVG to RCA)  2. EVH of the left legs  3. Left atrial appendage occlusion.    Surgeon: Dima Velasquez MD     Assistants: IZA Olivo was responsible for performing the following activities: Cardiac Surgery First assist, Endoscopic Vein [place] for CABG, surgical wound closure and their skilled assistance was necessary for the success of this case.     Anesthesia: General endotracheal anesthesia and CLAIR    Findings:  The saphenous vein was harvested endoscopically form the left  leg. The vein had a diameter of 3.5 mm and was of good quality. The coronaries had a diameter of 2 mm and were of fair quality.      Estimated Blood Loss:  600mL of Cell Saver returned.    STS Data: The STS Risk and all risks and alternatives were discussed with the patient and family.  Counseling was done regarding abuse of tobacco, alcohol and drugs as needed. They understand and wish to proceed. The antibiotics and b blockers were given in the STS required window.          Description of the procedure:     The patient was placed supine on the operative table. General anesthesia was given and lines placed. The patient was prepped and draped using the usual sterile technique. A median sternotomy was performed with a scalpel and the layers carried down to the sternum using the electrocautery. The sternum was split in the midline using a vertical oscillating saw. Hemostasis was achieved. The LIMA was harvested skeletand prepared with papaverine. It was of good quality. 300 units/kg of IV heparin was given to an ACT over 400. A Favaloro retractor was placed and the mediastinum exposed, the pericardium was opened and the edges tacked to the wound.  Cannulation sutures were placed in the ascending aorta and right atrium. Small cannulas were placed and aorto right atrial cardiopulmonary bypass was started. Cardioplegia cannulas were placed. Cardiopulmonary bypass was then established for 86 minutes drifting to 34°C at appropriate flow rates.  The aorta was crossclamped for 72 minutes and we gave 1000 cc of antegrade cold blood cardioplegia then 200L of retrograde cold blood cardioplegia and then repeated doses every 10 to 15 minutes to good effect. 3 veins were anastomosed to the ascending aorta with 6-0 Prolene and marked with washers.  The first vein was sewn to the distal right coronary artery with 7-0 Prolene.  The next vein was sewn to ramus intermedius with 7-0 Prolene.  The third vein was sewn obtuse marginal one of the circunflex artery with 7-0 Prolene. 40 mm Atriclip device (ATRICURE) was applied to occlude the left atrial appendage. The LIMA was sewn to the distal LAD with 7-0 Prolene. A warm dose of cardioplegia was given and then the aortic clamp removed as well as the LIMA bulldog clamp. All anastomoses were checked and had good flow and morphology. The left pleural space was suctioned and the lungs ventilated. The heart was paced till regular atrial rhythm resumed. I allowed the heart to eject and once hemodynamics were acceptable, then the CPB was discontinued and the venous and cardioplegia cannulas removed. The matching dose of protamine was given and the aortic cannula removed as well. AV temporary wires and pleural and mediastinal chest tubes were placed and the wound sprayed with platelet rich plasma. The sternum was closed with single and double wires and soft tissue in layers of reabsorbable material. The wounds were covered with sterile dressings.       Specimen removed:  none    CPB time:  86 minutes.    Aortic clamp time:  72 minutes    Complications:  none           Disposition: Cardiovascular recovery room           Condition:  Critical but stable.

## 2023-01-10 NOTE — PLAN OF CARE
Goal Outcome Evaluation:  Plan of Care Reviewed With: patient        Progress: no change   Scheduled for CABG with dr contreras this am. [id number]. up adlib. Afib on monitor. Belongings sent to CVR. No family at bedside before the procedure.

## 2023-01-10 NOTE — ANESTHESIA PROCEDURE NOTES
Airway  Urgency: elective    Date/Time: 1/10/2023 6:56 AM  Airway not difficult    General Information and Staff    Patient location during procedure: OR  Anesthesiologist: Luzma St MD    Indications and Patient Condition  Indications for airway management: airway protection    Preoxygenated: yes  MILS maintained throughout  Mask difficulty assessment: 2 - vent by mask + OA or adjuvant +/- NMBA    Final Airway Details  Final airway type: endotracheal airway      Successful airway: ETT  Cuffed: yes   Successful intubation technique: direct laryngoscopy  Endotracheal tube insertion site: oral  Blade: [place]  Blade size: 4  ETT size (mm): 8.0  Cormack-Lehane Classification: grade IIa - partial view of glottis  Placement verified by: chest auscultation and capnometry   Measured from: teeth  Number of attempts at approach: 1  Assessment: lips, teeth, and gum same as pre-op and atraumatic intubation

## 2023-01-10 NOTE — ANESTHESIA PROCEDURE NOTES
Arterial Line      Patient reassessed immediately prior to procedure    Start time: 1/10/2023 6:44 AM  Stop Time:1/10/2023 6:50 AM       Line placed for hemodynamic monitoring and ABGs/Labs/ISTAT.  Performed By   Anesthesiologist: Luzma St MD   Preanesthetic Checklist  Completed: patient identified, IV checked, site marked, risks and benefits discussed, surgical consent, monitors and equipment checked, pre-op evaluation and timeout performed  Arterial Line Prep    Sterile Tech: gloves, mask and cap  Prep: ChloraPrep  Patient monitoring: blood pressure monitoring, continuous pulse oximetry and EKG  Arterial Line Procedure   Laterality:right  Location:  radial artery  Catheter size: 20 G   Guidance: ultrasound guided  Number of attempts: 1  Successful placement: yes Images: still images not obtained  Post Assessment   Dressing Type: secured with tape and occlusive dressing applied.   Complications no  Circ/Move/Sens Assessment: normal and unchanged.   Patient Tolerance: patient tolerated the procedure well with no apparent complications

## 2023-01-11 ENCOUNTER — APPOINTMENT (OUTPATIENT)
Dept: GENERAL RADIOLOGY | Facility: HOSPITAL | Age: 68
DRG: 234 | End: 2023-01-11
Payer: MEDICARE

## 2023-01-11 LAB
ALBUMIN SERPL-MCNC: 4.2 G/DL (ref 3.5–5.2)
ANION GAP SERPL CALCULATED.3IONS-SCNC: 13.1 MMOL/L (ref 5–15)
BASOPHILS # BLD AUTO: 0.02 10*3/MM3 (ref 0–0.2)
BASOPHILS NFR BLD AUTO: 0.2 % (ref 0–1.5)
BH BB BLOOD EXPIRATION DATE: NORMAL
BH BB BLOOD EXPIRATION DATE: NORMAL
BH BB BLOOD TYPE BARCODE: 7300
BH BB BLOOD TYPE BARCODE: 7300
BH BB DISPENSE STATUS: NORMAL
BH BB DISPENSE STATUS: NORMAL
BH BB PRODUCT CODE: NORMAL
BH BB PRODUCT CODE: NORMAL
BH BB UNIT NUMBER: NORMAL
BH BB UNIT NUMBER: NORMAL
BUN SERPL-MCNC: 22 MG/DL (ref 8–23)
BUN/CREAT SERPL: 18.3 (ref 7–25)
CA-I BLD-MCNC: 4.7 MG/DL (ref 4.6–5.4)
CA-I SERPL ISE-MCNC: 1.17 MMOL/L (ref 1.15–1.35)
CALCIUM SPEC-SCNC: 8.6 MG/DL (ref 8.6–10.5)
CHLORIDE SERPL-SCNC: 105 MMOL/L (ref 98–107)
CO2 SERPL-SCNC: 19.9 MMOL/L (ref 22–29)
CREAT SERPL-MCNC: 1.2 MG/DL (ref 0.76–1.27)
CROSSMATCH INTERPRETATION: NORMAL
CROSSMATCH INTERPRETATION: NORMAL
DEPRECATED RDW RBC AUTO: 38.1 FL (ref 37–54)
EGFRCR SERPLBLD CKD-EPI 2021: 67.1 ML/MIN/1.73
EOSINOPHIL # BLD AUTO: 0 10*3/MM3 (ref 0–0.4)
EOSINOPHIL NFR BLD AUTO: 0 % (ref 0.3–6.2)
ERYTHROCYTE [DISTWIDTH] IN BLOOD BY AUTOMATED COUNT: 12.4 % (ref 12.3–15.4)
GLUCOSE BLDC GLUCOMTR-MCNC: 121 MG/DL (ref 70–130)
GLUCOSE BLDC GLUCOMTR-MCNC: 148 MG/DL (ref 70–130)
GLUCOSE BLDC GLUCOMTR-MCNC: 151 MG/DL (ref 70–130)
GLUCOSE BLDC GLUCOMTR-MCNC: 152 MG/DL (ref 70–130)
GLUCOSE BLDC GLUCOMTR-MCNC: 154 MG/DL (ref 70–130)
GLUCOSE BLDC GLUCOMTR-MCNC: 187 MG/DL (ref 70–130)
GLUCOSE BLDC GLUCOMTR-MCNC: 199 MG/DL (ref 70–130)
GLUCOSE SERPL-MCNC: 158 MG/DL (ref 65–99)
HCT VFR BLD AUTO: 33 % (ref 37.5–51)
HGB BLD-MCNC: 11.9 G/DL (ref 13–17.7)
INR PPP: 1.21 (ref 0.9–1.1)
LYMPHOCYTES # BLD AUTO: 0.53 10*3/MM3 (ref 0.7–3.1)
LYMPHOCYTES NFR BLD AUTO: 5.9 % (ref 19.6–45.3)
MAGNESIUM SERPL-MCNC: 2.3 MG/DL (ref 1.6–2.4)
MCH RBC QN AUTO: 30.5 PG (ref 26.6–33)
MCHC RBC AUTO-ENTMCNC: 36.1 G/DL (ref 31.5–35.7)
MCV RBC AUTO: 84.6 FL (ref 79–97)
MONOCYTES # BLD AUTO: 0.96 10*3/MM3 (ref 0.1–0.9)
MONOCYTES NFR BLD AUTO: 10.8 % (ref 5–12)
NEUTROPHILS NFR BLD AUTO: 7.36 10*3/MM3 (ref 1.7–7)
NEUTROPHILS NFR BLD AUTO: 82.5 % (ref 42.7–76)
PHOSPHATE SERPL-MCNC: 4.5 MG/DL (ref 2.5–4.5)
PLATELET # BLD AUTO: 100 10*3/MM3 (ref 140–450)
PMV BLD AUTO: 10.9 FL (ref 6–12)
POTASSIUM SERPL-SCNC: 3.7 MMOL/L (ref 3.5–5.2)
PROTHROMBIN TIME: 15.5 SECONDS (ref 11.7–14.2)
QT INTERVAL: 427 MS
RBC # BLD AUTO: 3.9 10*6/MM3 (ref 4.14–5.8)
SODIUM SERPL-SCNC: 138 MMOL/L (ref 136–145)
UNIT  ABO: NORMAL
UNIT  ABO: NORMAL
UNIT  RH: NORMAL
UNIT  RH: NORMAL
WBC NRBC COR # BLD: 8.92 10*3/MM3 (ref 3.4–10.8)

## 2023-01-11 PROCEDURE — 83735 ASSAY OF MAGNESIUM: CPT | Performed by: NURSE PRACTITIONER

## 2023-01-11 PROCEDURE — 25010000002 ENOXAPARIN PER 10 MG

## 2023-01-11 PROCEDURE — 71045 X-RAY EXAM CHEST 1 VIEW: CPT

## 2023-01-11 PROCEDURE — 25010000002 METOCLOPRAMIDE PER 10 MG: Performed by: NURSE PRACTITIONER

## 2023-01-11 PROCEDURE — 97110 THERAPEUTIC EXERCISES: CPT

## 2023-01-11 PROCEDURE — P9047 ALBUMIN (HUMAN), 25%, 50ML: HCPCS

## 2023-01-11 PROCEDURE — 25010000002 ALBUMIN HUMAN 25% PER 50 ML

## 2023-01-11 PROCEDURE — 93010 ELECTROCARDIOGRAM REPORT: CPT | Performed by: INTERNAL MEDICINE

## 2023-01-11 PROCEDURE — 25010000002 CEFAZOLIN IN DEXTROSE 2-4 GM/100ML-% SOLUTION

## 2023-01-11 PROCEDURE — 93005 ELECTROCARDIOGRAM TRACING: CPT | Performed by: NURSE PRACTITIONER

## 2023-01-11 PROCEDURE — 63710000001 INSULIN LISPRO (HUMAN) PER 5 UNITS: Performed by: NURSE PRACTITIONER

## 2023-01-11 PROCEDURE — 82330 ASSAY OF CALCIUM: CPT | Performed by: NURSE PRACTITIONER

## 2023-01-11 PROCEDURE — 80069 RENAL FUNCTION PANEL: CPT | Performed by: NURSE PRACTITIONER

## 2023-01-11 PROCEDURE — 25010000002 CEFAZOLIN IN DEXTROSE 2-4 GM/100ML-% SOLUTION: Performed by: NURSE PRACTITIONER

## 2023-01-11 PROCEDURE — 85610 PROTHROMBIN TIME: CPT | Performed by: NURSE PRACTITIONER

## 2023-01-11 PROCEDURE — 82962 GLUCOSE BLOOD TEST: CPT

## 2023-01-11 PROCEDURE — 25010000002 CALCIUM GLUCONATE-NACL 1-0.675 GM/50ML-% SOLUTION: Performed by: NURSE PRACTITIONER

## 2023-01-11 PROCEDURE — 97162 PT EVAL MOD COMPLEX 30 MIN: CPT

## 2023-01-11 PROCEDURE — 25010000002 FUROSEMIDE PER 20 MG: Performed by: NURSE PRACTITIONER

## 2023-01-11 PROCEDURE — 25010000002 MAGNESIUM SULFATE IN D5W 1G/100ML (PREMIX) 1-5 GM/100ML-% SOLUTION: Performed by: NURSE PRACTITIONER

## 2023-01-11 PROCEDURE — 85025 COMPLETE CBC W/AUTO DIFF WBC: CPT | Performed by: NURSE PRACTITIONER

## 2023-01-11 PROCEDURE — 63710000001 INSULIN LISPRO (HUMAN) PER 5 UNITS

## 2023-01-11 RX ORDER — FUROSEMIDE 10 MG/ML
40 INJECTION INTRAMUSCULAR; INTRAVENOUS ONCE
Status: COMPLETED | OUTPATIENT
Start: 2023-01-11 | End: 2023-01-11

## 2023-01-11 RX ORDER — NICOTINE POLACRILEX 4 MG
15 LOZENGE BUCCAL
Status: DISCONTINUED | OUTPATIENT
Start: 2023-01-11 | End: 2023-01-16 | Stop reason: HOSPADM

## 2023-01-11 RX ORDER — CALCIUM GLUCONATE 20 MG/ML
1 INJECTION, SOLUTION INTRAVENOUS ONCE
Status: COMPLETED | OUTPATIENT
Start: 2023-01-11 | End: 2023-01-11

## 2023-01-11 RX ORDER — ALBUMIN (HUMAN) 12.5 G/50ML
12.5 SOLUTION INTRAVENOUS ONCE
Status: COMPLETED | OUTPATIENT
Start: 2023-01-11 | End: 2023-01-11

## 2023-01-11 RX ORDER — GUAIFENESIN 600 MG/1
1200 TABLET, EXTENDED RELEASE ORAL EVERY 12 HOURS SCHEDULED
Status: DISCONTINUED | OUTPATIENT
Start: 2023-01-11 | End: 2023-01-16 | Stop reason: HOSPADM

## 2023-01-11 RX ORDER — POTASSIUM CHLORIDE 750 MG/1
20 TABLET, FILM COATED, EXTENDED RELEASE ORAL ONCE
Status: COMPLETED | OUTPATIENT
Start: 2023-01-11 | End: 2023-01-11

## 2023-01-11 RX ORDER — INSULIN LISPRO 100 [IU]/ML
5 INJECTION, SOLUTION INTRAVENOUS; SUBCUTANEOUS
Status: DISCONTINUED | OUTPATIENT
Start: 2023-01-12 | End: 2023-01-12

## 2023-01-11 RX ORDER — ATORVASTATIN CALCIUM 80 MG/1
80 TABLET, FILM COATED ORAL NIGHTLY
Status: DISCONTINUED | OUTPATIENT
Start: 2023-01-11 | End: 2023-01-16 | Stop reason: HOSPADM

## 2023-01-11 RX ORDER — DEXTROSE MONOHYDRATE 25 G/50ML
25 INJECTION, SOLUTION INTRAVENOUS
Status: DISCONTINUED | OUTPATIENT
Start: 2023-01-11 | End: 2023-01-16 | Stop reason: HOSPADM

## 2023-01-11 RX ORDER — SODIUM CHLORIDE 9 MG/ML
200 INJECTION, SOLUTION INTRAVENOUS ONCE
Status: COMPLETED | OUTPATIENT
Start: 2023-01-11 | End: 2023-01-11

## 2023-01-11 RX ORDER — INSULIN LISPRO 100 [IU]/ML
0-9 INJECTION, SOLUTION INTRAVENOUS; SUBCUTANEOUS
Status: DISCONTINUED | OUTPATIENT
Start: 2023-01-11 | End: 2023-01-14

## 2023-01-11 RX ADMIN — ASPIRIN 81 MG: 81 TABLET, COATED ORAL at 08:15

## 2023-01-11 RX ADMIN — HYDROCODONE BITARTRATE AND ACETAMINOPHEN 1 TABLET: 5; 325 TABLET ORAL at 10:15

## 2023-01-11 RX ADMIN — CHLORHEXIDINE GLUCONATE 15 ML: 1.2 SOLUTION ORAL at 21:01

## 2023-01-11 RX ADMIN — INSULIN LISPRO 2 UNITS: 100 INJECTION, SOLUTION INTRAVENOUS; SUBCUTANEOUS at 12:17

## 2023-01-11 RX ADMIN — CHLORHEXIDINE GLUCONATE 15 ML: 1.2 SOLUTION ORAL at 08:15

## 2023-01-11 RX ADMIN — SODIUM CHLORIDE 200 ML: 9 INJECTION, SOLUTION INTRAVENOUS at 05:12

## 2023-01-11 RX ADMIN — CYCLOBENZAPRINE 10 MG: 10 TABLET, FILM COATED ORAL at 13:40

## 2023-01-11 RX ADMIN — POTASSIUM CHLORIDE 20 MEQ: 750 TABLET, EXTENDED RELEASE ORAL at 08:15

## 2023-01-11 RX ADMIN — MUPIROCIN 1 APPLICATION: 20 OINTMENT TOPICAL at 08:14

## 2023-01-11 RX ADMIN — CEFAZOLIN SODIUM 2 G: 2 INJECTION, SOLUTION INTRAVENOUS at 18:55

## 2023-01-11 RX ADMIN — OXYCODONE HYDROCHLORIDE 10 MG: 5 TABLET ORAL at 00:36

## 2023-01-11 RX ADMIN — INSULIN LISPRO 2 UNITS: 100 INJECTION, SOLUTION INTRAVENOUS; SUBCUTANEOUS at 18:55

## 2023-01-11 RX ADMIN — METOPROLOL TARTRATE 12.5 MG: 25 TABLET, FILM COATED ORAL at 21:01

## 2023-01-11 RX ADMIN — HYDROCODONE BITARTRATE AND ACETAMINOPHEN 2 TABLET: 5; 325 TABLET ORAL at 04:39

## 2023-01-11 RX ADMIN — GUAIFENESIN 1200 MG: 600 TABLET, EXTENDED RELEASE ORAL at 21:01

## 2023-01-11 RX ADMIN — FUROSEMIDE 40 MG: 10 INJECTION, SOLUTION INTRAMUSCULAR; INTRAVENOUS at 08:14

## 2023-01-11 RX ADMIN — HYDROCODONE BITARTRATE AND ACETAMINOPHEN 2 TABLET: 5; 325 TABLET ORAL at 18:55

## 2023-01-11 RX ADMIN — ENOXAPARIN SODIUM 40 MG: 100 INJECTION SUBCUTANEOUS at 18:55

## 2023-01-11 RX ADMIN — CEFAZOLIN SODIUM 2 G: 2 INJECTION, SOLUTION INTRAVENOUS at 11:49

## 2023-01-11 RX ADMIN — INSULIN GLARGINE-YFGN 15 UNITS: 100 INJECTION, SOLUTION SUBCUTANEOUS at 21:01

## 2023-01-11 RX ADMIN — MAGNESIUM SULFATE IN DEXTROSE 1 G: 10 INJECTION, SOLUTION INTRAVENOUS at 05:12

## 2023-01-11 RX ADMIN — MUPIROCIN 1 APPLICATION: 20 OINTMENT TOPICAL at 21:01

## 2023-01-11 RX ADMIN — METOCLOPRAMIDE 10 MG: 5 INJECTION, SOLUTION INTRAMUSCULAR; INTRAVENOUS at 00:36

## 2023-01-11 RX ADMIN — CALCIUM GLUCONATE 1 G: 20 INJECTION, SOLUTION INTRAVENOUS at 08:15

## 2023-01-11 RX ADMIN — CYCLOBENZAPRINE 10 MG: 10 TABLET, FILM COATED ORAL at 03:25

## 2023-01-11 RX ADMIN — ACETAMINOPHEN 1000 MG: 10 INJECTION, SOLUTION INTRAVENOUS at 04:32

## 2023-01-11 RX ADMIN — DOCUSATE SODIUM 50MG AND SENNOSIDES 8.6MG 2 TABLET: 8.6; 5 TABLET, FILM COATED ORAL at 21:01

## 2023-01-11 RX ADMIN — CEFAZOLIN SODIUM 2 G: 2 INJECTION, SOLUTION INTRAVENOUS at 02:26

## 2023-01-11 RX ADMIN — ALBUMIN HUMAN 12.5 G: 0.25 SOLUTION INTRAVENOUS at 05:11

## 2023-01-11 RX ADMIN — OXYCODONE HYDROCHLORIDE 10 MG: 5 TABLET ORAL at 15:05

## 2023-01-11 RX ADMIN — GUAIFENESIN 1200 MG: 600 TABLET, EXTENDED RELEASE ORAL at 08:15

## 2023-01-11 RX ADMIN — ATORVASTATIN CALCIUM 80 MG: 80 TABLET, FILM COATED ORAL at 21:01

## 2023-01-11 RX ADMIN — PANTOPRAZOLE SODIUM 40 MG: 40 TABLET, DELAYED RELEASE ORAL at 08:15

## 2023-01-11 RX ADMIN — INSULIN LISPRO 2 UNITS: 100 INJECTION, SOLUTION INTRAVENOUS; SUBCUTANEOUS at 21:01

## 2023-01-11 NOTE — PROGRESS NOTES
Joshua Wolf   67 y.o.  male    LOS: 4 days   Patient Care Team:  Eric Eubanks MD as PCP - General (Family Medicine)  Jamir Hughes MD as Consulting Physician (Nephrology)      Subjective     Interval History:     Patient Complaints:     Review of Systems:       Medication Review:   Current Facility-Administered Medications:   •  acetaminophen (TYLENOL) tablet 650 mg, 650 mg, Oral, Q4H PRN **OR** acetaminophen (TYLENOL) 160 MG/5ML solution 650 mg, 650 mg, Oral, Q4H PRN **OR** acetaminophen (TYLENOL) suppository 650 mg, 650 mg, Rectal, Q4H PRN, Alessandra Duarte, APRN  •  ALPRAZolam (XANAX) tablet 0.25 mg, 0.25 mg, Oral, Q8H PRN, Alessandra Duarte, APRN  •  aspirin EC tablet 81 mg, 81 mg, Oral, Daily, Alessandra Duarte, APRN, 81 mg at 01/11/23 0815  •  atorvastatin (LIPITOR) tablet 40 mg, 40 mg, Oral, Nightly, Alessandra Duarte, APRN, 40 mg at 01/10/23 2136  •  bisacodyl (DULCOLAX) EC tablet 10 mg, 10 mg, Oral, Daily PRN, Alessandra Duarte APRN  •  bisacodyl (DULCOLAX) suppository 10 mg, 10 mg, Rectal, Daily PRN, Alessandra Duarte, APRN  •  ceFAZolin in dextrose (ANCEF) IVPB solution 2 g, 2 g, Intravenous, Q8H, Alessandra Duarte APRN, 2 g at 01/11/23 0226  •  chlorhexidine (PERIDEX) 0.12 % solution 15 mL, 15 mL, Mouth/Throat, Q12H, Alessandra Duarte APRN, 15 mL at 01/11/23 0815  •  clevidipine (CLEVIPREX) infusion 0.5 mg/mL, 2-32 mg/hr, Intravenous, Continuous PRN, Alessandra Duarte APRN  •  cyclobenzaprine (FLEXERIL) tablet 10 mg, 10 mg, Oral, Q8H PRN, DuarteAlessandra basurto APRN, 10 mg at 01/11/23 0325  •  dexmedetomidine (PRECEDEX) 400 mcg in 100 mL NS infusion, 0.2-1.5 mcg/kg/hr, Intravenous, Titrated, Alessandra Duarte APRN, Stopped at 01/10/23 1635  •  dextrose (D50W) (25 g/50 mL) IV injection 25 g, 25 g, Intravenous, Q15 Min PRN, Radha Madrid APRN  •  dextrose (GLUTOSE) oral gel 15 g, 15 g, Oral, Q15 Min PRN, Radha Madrid APRN  •  DOPamine 400 mg in 250 mL D5W infusion, 2-20 mcg/kg/min,  Intravenous, Continuous PRN, Alessandra Duarte APRN  •  Enoxaparin Sodium (LOVENOX) syringe 40 mg, 40 mg, Subcutaneous, Q24H, Alessandra Duarte APRN  •  EPINEPHrine 5 mg in 250 mL NS infusion, 0.02-0.1 mcg/kg/min, Intravenous, Continuous PRN, Alessandra Duarte APRN  •  glucagon (human recombinant) (GLUCAGEN DIAGNOSTIC) injection 1 mg, 1 mg, Intramuscular, Q15 Min PRN, Radha Madrid APRN  •  guaiFENesin (MUCINEX) 12 hr tablet 1,200 mg, 1,200 mg, Oral, Q12H, Radha Madrid APRN, 1,200 mg at 01/11/23 0815  •  HYDROcodone-acetaminophen (NORCO) 5-325 MG per tablet 2 tablet, 2 tablet, Oral, Q4H PRN, Alessandra Duarte APRN, 2 tablet at 01/11/23 0439  •  insulin lispro (ADMELOG) injection 0-9 Units, 0-9 Units, Subcutaneous, 4x Daily With Meals & Nightly, Radha Madrid APRN  •  magnesium hydroxide (MILK OF MAGNESIA) suspension 10 mL, 10 mL, Oral, Daily PRN, Alessandra Duarte APRN  •  metoclopramide (REGLAN) injection 10 mg, 10 mg, Intravenous, Q6H, Alessandra Duarte APRN, 10 mg at 01/11/23 0036  •  metoprolol tartrate (LOPRESSOR) tablet 12.5 mg, 12.5 mg, Oral, Q12H, Alessandra Duarte APRN  •  midazolam (VERSED) injection 2 mg, 2 mg, Intravenous, Q1H PRN, Alessandra Duarte APRN  •  milrinone (PRIMACOR) 20 mg in 100 mL D5W infusion, 0.25-0.375 mcg/kg/min, Intravenous, Continuous PRN, Alessandra Duarte APRN, Last Rate: 4.2 mL/hr at 01/11/23 0458, 0.125 mcg/kg/min at 01/11/23 0458  •  morphine injection 1 mg, 1 mg, Intravenous, Q4H PRN **AND** naloxone (NARCAN) injection 0.4 mg, 0.4 mg, Intravenous, Q5 Min PRN, Alessandra Duarte APRN  •  morphine injection 4 mg, 4 mg, Intravenous, Q30 Min PRN, Alessandra Duarte APRN  •  mupirocin (BACTROBAN) 2 % nasal ointment, , Each Nare, BID, Alessandra Duarte APRN, 1 application at 01/11/23 0814  •  niCARdipine (CARDENE) 25 mg in 250 mL NS infusion kit, 5-15 mg/hr, Intravenous, Continuous PRN, Alessandra Duarte APRN  •  nitroglycerin (TRIDIL) 200 mcg/ml infusion,  5-200 mcg/min, Intravenous, Titrated, Alessandra Duarte APRN  •  norepinephrine (LEVOPHED) 8 mg in 250 mL NS infusion (premix), 0.02-0.2 mcg/kg/min, Intravenous, Continuous PRN, Alessandra Duarte APRN, Stopped at 01/11/23 0840  •  ondansetron (ZOFRAN) injection 4 mg, 4 mg, Intravenous, Q6H PRN, Alessandra Duarte APRN  •  oxyCODONE (ROXICODONE) immediate release tablet 10 mg, 10 mg, Oral, Q4H PRN, Alessandra Duarte, APRN, 10 mg at 01/11/23 0036  •  [COMPLETED] pantoprazole (PROTONIX) injection 40 mg, 40 mg, Intravenous, Once, 40 mg at 01/10/23 1242 **FOLLOWED BY** pantoprazole (PROTONIX) EC tablet 40 mg, 40 mg, Oral, QAM, Alessandra Duarte, BREANNE, 40 mg at 01/11/23 0815  •  phenylephrine (NUSRAT-SYNEPHRINE) 50 mg in 250 mL NS infusion, 0.2-2 mcg/kg/min, Intravenous, Continuous PRN, Alessandra Duaret APRN  •  polyethylene glycol (MIRALAX) packet 17 g, 17 g, Oral, Daily PRN, Alessandra Duarte APRN  •  potassium chloride (K-DUR,KLOR-CON) ER tablet 40 mEq, 40 mEq, Oral, PRN **OR** potassium chloride (KLOR-CON) packet 40 mEq, 40 mEq, Oral, PRN, Alessandra Duarte APRN  •  potassium chloride 10 mEq in 100 mL IVPB, 10 mEq, Intravenous, Q1H PRN **OR** potassium chloride 10 mEq in 100 mL IVPB, 10 mEq, Intravenous, Q1H PRN, Alessandra Duarte APRN  •  potassium chloride 20 mEq in 50 mL IVPB, 20 mEq, Intravenous, Q1H PRN, Alessandra Duarte, APRN  •  potassium chloride 20 mEq in 50 mL IVPB, 20 mEq, Intravenous, Q1H PRN, Alessandra Duarte APRN  •  potassium chloride 20 mEq in 50 mL IVPB, 20 mEq, Intravenous, Q1H PRN, Alessandra Duarte APRN  •  propofol (DIPRIVAN) infusion 10 mg/mL 100 mL, 5-50 mcg/kg/min, Intravenous, Continuous PRN, Alessandra Duarte APRN, Stopped at 01/10/23 1523  •  sennosides-docusate (PERICOLACE) 8.6-50 MG per tablet 2 tablet, 2 tablet, Oral, Nightly, Alessandra Duarte APRN  •  sodium chloride 0.9 % infusion, 30 mL/hr, Intravenous, Continuous, Alessandra Duarte APRN, Last Rate: 30 mL/hr at 01/10/23 1231, 30 mL/hr at  01/10/23 1231      Objective   Vital Sign Min/Max for last 24 hours  Temp  Min: 96.1 °F (35.6 °C)  Max: 99.3 °F (37.4 °C)   BP  Min: 94/66  Max: 116/72    Pulse  Min: 70  Max: 89     Wt Readings from Last 3 Encounters:   01/11/23 113 kg (249 lb 1.9 oz)   04/12/22 118 kg (260 lb 12.8 oz)   10/14/21 112 kg (246 lb)        Intake/Output Summary (Last 24 hours) at 1/11/2023 0902  Last data filed at 1/11/2023 0700  Gross per 24 hour   Intake 4482.89 ml   Output 1965 ml   Net 2517.89 ml     Physical Exam:      General Appearance:    Well developed and well nourished in no acute distress   Head:    Normocephalic, atraumatic   Eyes:            Conjunctivae normal, anicteric, no xanthelasma   Neck:   supple, trachea midline, no thyromegaly, no carotid bruit, no JVD, no elevated CVP   Lungs:     Clear to auscultation,respirations regular, even and                  unlabored    Heart:    Regular rhythm and normal rate, normal S1 and S2,            No murmur, no gallop, no rub, no click   Chest Wall:    No abnormalities observed   Abdomen:     Normal bowel sounds, no masses, no organomegaly, soft        nontender, nondistended, no guarding, no rebound                tenderness   Rectal:     Deferred   Extremities:   No edema. Moves all extremities well, no cyanosis, no erythema   Pulses:   Pulses palpable and equal bilaterally   Skin:  sternal drsg in place   Neurologic:   awake alert and oriented x3, speech clear and approp, no facial drooping     : voids   Monitor:   nsr        Results Review:         Sodium Sodium   Date Value Ref Range Status   01/11/2023 138 136 - 145 mmol/L Final   01/10/2023 139 136 - 145 mmol/L Final   01/10/2023 136 136 - 145 mmol/L Final   01/10/2023 137 136 - 145 mmol/L Final      Potassium Potassium   Date Value Ref Range Status   01/11/2023 3.7 3.5 - 5.2 mmol/L Final   01/10/2023 3.9 3.5 - 5.2 mmol/L Final   01/10/2023 4.2 3.5 - 5.2 mmol/L Final     Comment:     Slight hemolysis detected by  analyzer. Results may be affected.   01/10/2023 4.1 3.5 - 5.2 mmol/L Final     Comment:     Slight hemolysis detected by analyzer. Results may be affected.      Chloride Chloride   Date Value Ref Range Status   01/11/2023 105 98 - 107 mmol/L Final   01/10/2023 107 98 - 107 mmol/L Final   01/10/2023 104 98 - 107 mmol/L Final   01/10/2023 102 98 - 107 mmol/L Final      Bicarbonate No results found for: PLASMABICARB   BUN BUN   Date Value Ref Range Status   01/11/2023 22 8 - 23 mg/dL Final   01/10/2023 21 8 - 23 mg/dL Final   01/10/2023 23 8 - 23 mg/dL Final   01/10/2023 25 (H) 8 - 23 mg/dL Final      Creatinine Creatinine   Date Value Ref Range Status   01/11/2023 1.20 0.76 - 1.27 mg/dL Final   01/10/2023 1.34 (H) 0.76 - 1.27 mg/dL Final   01/10/2023 1.37 (H) 0.76 - 1.27 mg/dL Final   01/10/2023 1.25 0.76 - 1.27 mg/dL Final      Calcium Calcium   Date Value Ref Range Status   01/11/2023 8.6 8.6 - 10.5 mg/dL Final   01/10/2023 8.9 8.6 - 10.5 mg/dL Final   01/10/2023 10.0 8.6 - 10.5 mg/dL Final   01/10/2023 9.2 8.6 - 10.5 mg/dL Final      Magnesium Magnesium   Date Value Ref Range Status   01/11/2023 2.3 1.6 - 2.4 mg/dL Final   01/10/2023 2.7 (H) 1.6 - 2.4 mg/dL Final   01/10/2023 2.8 (H) 1.6 - 2.4 mg/dL Final        Results from last 7 days   Lab Units 01/11/23  0308   WBC 10*3/mm3 8.92   HEMOGLOBIN g/dL 11.9*   HEMATOCRIT % 33.0*   PLATELETS 10*3/mm3 100*     Lab Results   Lab Value Date/Time    TROPONINT 0.136 (C) 01/06/2023 0552    TROPONINT 0.164 (C) 01/06/2023 0024    TROPONINT 0.178 (C) 01/05/2023 1746    TROPONINT 0.143 (C) 01/05/2023 1508    TROPONINT <0.010 01/05/2023 0858    TROPONINT <0.010 10/30/2017 0114    TROPONINT <0.010 08/12/2016 0944    TROPONINT 0.246 (C) 07/19/2016 0404    TROPONINT <0.010 07/18/2016 0956            Echo EF Estimated  Lab Results   Component Value Date    ECHOEFEST 35 07/18/2016         Assessment/ Plan  Assessment & Plan   Active Hospital Problems    Diagnosis  POA   • **Chest  pain [R07.9]  Yes     Priority: Low   • Chest pain, unspecified type [R07.9]  Yes     Priority: Low   • NSTEMI (non-ST elevated myocardial infarction) (HCC) [I21.4]  Unknown     Priority: Low     Added automatically from request for surgery [id number]     • Abnormal findings on diagnostic imaging of heart and coronary circulation [R93.1]  Unknown     Priority: Low     Added automatically from request for surgery [id number]         A/P: 66 yo male with Known CAD with CP.     1. CP/CAD - unstable angina - in patient with previous PCI of LAD x 2 - repeat LHC showed diffuse disease distal to previous stents in the LAD, LCx has 80% OM1 and 70% OM2, with occlusion of OM3 distally, RCA with mid diffuse 80% disease. EF normal.   POD 1 CABG x 4 (LIMA to LAD, SVG to ramus, SVG to OM, SVG to RCA), Left atrial appendage occlusion    intraop CLAIR   Ventricles  Right Ventricle:  Hypertrophy not present.  Thrombus not present.  Global function normal.    Left Ventricle:  Thrombus not present.  Global Function mildly impaired.  Ejection Fraction 50%.     Valves  Aortic Valve:  Stenosis not present.  Regurgitation trace.  Leaflets normal.  Leaflet motions normal and prolapsed.    Mitral Valve:  Stenosis not present.  Regurgitation trace.  Leaflets normal.  Leaflet motions normal.    Tricuspid Valve:  Stenosis not present.  Regurgitation absent.  Leaflet motions normal.    Pulmonic Valve:  Regurgitation trace.     2. HTN      3. HLD   - on Statin,     4.  Paroxysmal, Atrial Fibrillation    - elevated [id number] score of at least 4 - should be on life long AC -     5. ckd stage 3    6. HFpEF  cxr-IMPRESSION: Increasing vascular congestion and bibasilar atelectasis.     Plan  Consider adding amiod for at fib prophylaxis in pt with known PAF- [facility]  CXR with vascular congestion-- consider adding IV diuresis- [facility] to [facility], had lasix iv x1 today  Milrinone gtt  Weaning levophed  Increase statin to high dose    Chuyita Tate,  BREANNE  01/11/23  09:02 EST    Patient seen and examined  Heart S1-S2 normal lungs clear    To continue current treatment

## 2023-01-11 NOTE — CONSULTS
Met with patient and family, discussed benefits of cardiac rehab. Provided phase II information along with the contact information for cardiac rehab here at [facility]. Patient familiar with program, attended previously. Patient plans to use home health.Explained if receiving home health would not be able to attend cardiac rehab until finished with home health. Patient to call when ready to schedule cardiac rehab.

## 2023-01-11 NOTE — PLAN OF CARE
Goal Outcome Evaluation:  Plan of Care Reviewed With: patient           Outcome Evaluation: Pt admitted with NSTEMI, s/p CABG 1-10.  Pt reports normally independent, exercises atMilestone 4 days/wk, no use of AD or h/o falls.  Today pt demonstrates impaired activity otlerance, gait pattern and independence w/ mobility from baseline but able to ambulate 120' w/ contact assist, single UE support on IV pole.  Recommend DC to home with assist.

## 2023-01-11 NOTE — THERAPY EVALUATION
Patient Name: Joshua Wolf  : 1955    MRN: [id number]                              Today's Date: 2023       Admit Date: 2023    Visit Dx:     ICD-10-CM ICD-9-CM   1. NSTEMI (non-ST elevated myocardial infarction) (HCC)  I21.4 410.70   2. Chest pain, unspecified type  R07.9 786.50   3. Abnormal findings on diagnostic imaging of heart and coronary circulation  R93.1 794.39   4. S/P CABG (coronary artery bypass graft)  Z95.1 V45.81     Patient Active Problem List   Diagnosis   • Obesity (BMI 30.0-34.9)   • CHF (congestive heart failure), NYHA class I (HCC)   • Type 2 diabetes mellitus without complication (HCC)   • Hyperlipidemia   • Coronary artery disease   • Anterior myocardial infarction (HCC)   • Skin lesion   • Chest pain   • Chest pain, unspecified type   • NSTEMI (non-ST elevated myocardial infarction) (Formerly [facility])   • Abnormal findings on diagnostic imaging of heart and coronary circulation     Past Medical History:   Diagnosis Date   • CHF (congestive heart failure) (HCC)    • Coronary artery disease    • Diabetes mellitus (HCC)    • Hyperlipidemia    • Myocardial infarction (HCC)    • Paroxysmal A-fib (Formerly [facility])    • Seizures (Formerly [facility])      Past Surgical History:   Procedure Laterality Date   • CARDIAC CATHETERIZATION N/A 2016    Procedure: Left Heart Cath;  Surgeon: Bella Clinton MD;  Location:  INVASIVE LOCATION;  Service:    • CARDIAC CATHETERIZATION N/A 10/30/2017    Procedure: Left Heart Cath;  Surgeon: Zully Gregg MD;  Location: [facility] CATH INVASIVE LOCATION;  Service:    • CARDIAC CATHETERIZATION N/A 10/30/2017    Procedure: Coronary angiography;  Surgeon: Zully Gregg MD;  Location: [facility] CATH INVASIVE LOCATION;  Service:    • CARDIAC CATHETERIZATION N/A 10/30/2017    Procedure: Stent SANIA coronary;  Surgeon: Zully Gregg MD;  Location: [facility] CATH INVASIVE LOCATION;  Service:    • CARDIAC CATHETERIZATION N/A 2023    Procedure: LEFT HEART CATH;  Surgeon:  Bernardo Dominguez MD;  Location: [facility] CATH INVASIVE LOCATION;  Service: Cardiovascular;  Laterality: N/A;   • CARDIAC CATHETERIZATION N/A 1/6/2023    Procedure: CORONARY ANGIOGRAPHY;  Surgeon: Bernardo Dominguez MD;  Location: [facility] CATH INVASIVE LOCATION;  Service: Cardiovascular;  Laterality: N/A;   • CARDIAC CATHETERIZATION N/A 1/6/2023    Procedure: Resting Full Cycle Ratio;  Surgeon: Bernardo Dominguez MD;  Location: [facility] CATH INVASIVE LOCATION;  Service: Cardiovascular;  Laterality: N/A;   • CARDIAC CATHETERIZATION N/A 1/6/2023    Procedure: Left ventriculography;  Surgeon: Bernardo Dominguez MD;  Location: [facility] CATH INVASIVE LOCATION;  Service: Cardiovascular;  Laterality: N/A;   • CORONARY ARTERY BYPASS GRAFT N/A 1/10/2023    Procedure: INTRAOPERATIVE CLAIR; STERNOTOMY; ATRIAL APPENDAGE CLOSURE; CORONARY ARTERY BYPASS GRAFT TIMES FOUR USING LEFT INTERNAL MAMMARY ARTERY GRAFT UTILIZING ENDOSCOPICALLY HARVESTED LEFT GREATER SAPHENOUS  VEIN AND PRP.;  Surgeon: Dima Velasquez MD;  Location: [facility] CVOR;  Service: Cardiothoracic;  Laterality: N/A;   • RI RT/LT HEART CATHETERS N/A 10/30/2017    Procedure: Percutaneous Coronary Intervention;  Surgeon: Zully Gregg MD;  Location: [facility] CATH INVASIVE LOCATION;  Service: Cardiovascular      General Information     Row Name 01/11/23 1126          Physical Therapy Time and Intention    Document Type evaluation  -AR     Mode of Treatment physical therapy  -AR     Row Name 01/11/23 1126          General Information    Patient Profile Reviewed yes  -AR     Prior Level of Function independent:  works out at [employer] 4x/wk  -AR     Existing Precautions/Restrictions fall;sternal  -AR     Barriers to Rehab none identified  -AR     Row Name 01/11/23 1126          Living Environment    People in Home spouse  -AR     Row Name 01/11/23 1126          Home Main Entrance    Number of Stairs, Main Entrance three  -AR     Stair Railings, Main Entrance railings safe and in  good condition  -AR     Row Name 01/11/23 1126          Cognition    Orientation Status (Cognition) oriented x 3  -AR     Row Name 01/11/23 1126          Safety Issues, Functional Mobility    Impairments Affecting Function (Mobility) endurance/activity tolerance;shortness of breath;strength;pain  -AR           User Key  (r) = Recorded By, (t) = Taken By, (c) = Cosigned By    Initials Name Provider Type    AR Magalie García, PT Physical Therapist               Mobility     Row Name 01/11/23 1127          Bed Mobility    Bed Mobility supine-sit;sit-supine  -AR     Supine-Sit [place] (Bed Mobility) not tested  -AR     Sit-Supine [place] (Bed Mobility) not tested  -AR     Row Name 01/11/23 1127          Sit-Stand Transfer    Sit-Stand [place] (Transfers) contact guard  -AR     Row Name 01/11/23 1127          Gait/Stairs (Locomotion)    [place] Level (Gait) contact guard;1 person to manage equipment  -AR     Distance in Feet (Gait) 120' single UE support on IV pole  -AR     Deviations/Abnormal Patterns (Gait) gait speed decreased;antalgic  -AR           User Key  (r) = Recorded By, (t) = Taken By, (c) = Cosigned By    Initials Name Provider Type    AR Magalie García, PT Physical Therapist               Obj/Interventions     Row Name 01/11/23 1127          Range of Motion Comprehensive    Comment, General Range of Motion B LE WFL  -AR     Row Name 01/11/23 1127          Strength Comprehensive (MMT)    Comment, General Manual Muscle Testing (MMT) Assessment B LE WFL  -AR     Row Name 01/11/23 1127          Motor Skills    Therapeutic Exercise --  cardiac ex  -AR     Row Name 01/11/23 1127          Balance    Balance Assessment standing dynamic balance  -AR     Dynamic Standing Balance contact guard  -AR           User Key  (r) = Recorded By, (t) = Taken By, (c) = Cosigned By    Initials Name Provider Type    Magalie Jensen, PT Physical Therapist               Goals/Plan     Row Name 01/11/23  1131          Problem Specific Goal 1 (PT)    Problem Specific Goal 1 (PT) cardiac level V  -AR     Time Frame (Problem Specific Goal 1, PT) 1 week  -AR     Row Name 01/11/23 1131          Therapy Assessment/Plan (PT)    Planned Therapy Interventions (PT) balance training;bed mobility training;gait training;home exercise program;patient/family education;transfer training;ROM (range of motion);stair training;strengthening  -AR           User Key  (r) = Recorded By, (t) = Taken By, (c) = Cosigned By    Initials Name Provider Type    AR Magalie García, PT Physical Therapist               Clinical Impression     Row Name 01/11/23 1128          Pain    Pretreatment Pain Rating 2/10  -AR     Posttreatment Pain Rating 2/10  -AR     Pain Location incisional  -AR     Pain Location - chest  -AR     Pain Intervention(s) Medication (See MAR);Repositioned  -AR     Row Name 01/11/23 1128          Plan of Care Review    Plan of Care Reviewed With patient  -AR     Outcome Evaluation Pt admitted with NSTEMI, s/p CABG 1-10.  Pt reports normally independent, exercises [facility] 4 days/wk, no use of AD or h/o falls.  Today pt demonstrates impaired activity otlerance, gait pattern and independence w/ mobility from baseline but able to ambulate 120' w/ contact assist, single UE support on IV pole.  Recommend DC to home with assist.  -AR     Row Name 01/11/23 1128          Therapy Assessment/Plan (PT)    Rehab Potential (PT) good, to achieve stated therapy goals  -AR     Criteria for Skilled Interventions Met (PT) yes  -AR     Therapy Frequency (PT) 6 times/wk  -AR     Row Name 01/11/23 1128          Vital Signs    O2 Delivery Pre Treatment supplemental O2  -AR     Row Name 01/11/23 1128          Positioning and Restraints    Pre-Treatment Position sitting in chair/recliner  -AR     Post Treatment Position chair  -AR     In Chair notified nsg;sitting;call light within reach;encouraged to call for assist;with nsg  -AR           User  Key  (r) = Recorded By, (t) = Taken By, (c) = Cosigned By    Initials Name Provider Type    AR Magalie García PT Physical Therapist               Outcome Measures     Row Name 01/11/23 1131          How much help from another person do you currently need...    Turning from your back to your side while in flat bed without using bedrails? 3  -AR     Moving from lying on back to sitting on the side of a flat bed without bedrails? 2  -AR     Moving to and from a bed to a chair (including a wheelchair)? 3  -AR     Standing up from a chair using your arms (e.g., wheelchair, bedside chair)? 3  -AR     Climbing 3-5 steps with a railing? 2  -AR     To walk in hospital room? 3  -AR     AM-PAC 6 Clicks Score (PT) 16  -AR     Highest level of mobility 5 --> Static standing  -AR     Row Name 01/11/23 1131          Functional Assessment    Outcome Measure Options AM-PAC 6 Clicks Basic Mobility (PT)  -AR           User Key  (r) = Recorded By, (t) = Taken By, (c) = Cosigned By    Initials Name Provider Type    AR Magalie García PT Physical Therapist                             Physical Therapy Education     Title: PT OT SLP Therapies (In Progress)     Topic: Physical Therapy (In Progress)     Point: Mobility training (In Progress)     Learning Progress Summary           Patient Acceptance, E, NR by AR at 1/11/2023 1131                   Point: Home exercise program (In Progress)     Learning Progress Summary           Patient Acceptance, E, NR by AR at 1/11/2023 1131                   Point: Body mechanics (In Progress)     Learning Progress Summary           Patient Acceptance, E, NR by AR at 1/11/2023 1131                   Point: Precautions (In Progress)     Learning Progress Summary           Patient Acceptance, E, NR by AR at 1/11/2023 1131                               User Key     Initials Effective Dates Name Provider Type Discipline    AR 06/16/21 -  Magalie García PT Physical Therapist PT              PT  Recommendation and Plan  Planned Therapy Interventions (PT): balance training, bed mobility training, gait training, home exercise program, patient/family education, transfer training, ROM (range of motion), stair training, strengthening  Plan of Care Reviewed With: patient  Outcome Evaluation: Pt admitted with NSTEMI, s/p CABG 1-10.  Pt reports normally independent, [facility], no use of AD or h/o falls.  Today pt demonstrates impaired activity otlerance, gait pattern and independence w/ mobility from baseline but able to ambulate 120' w/ contact assist, single UE support on IV pole.  Recommend DC to home with assist.     Time Calculation:    PT Charges     Row Name 01/11/23 1126             Time Calculation    Start Time 0949  -AR      Stop Time 1020  -AR      Time Calculation (min) 31 min  -AR      PT Received On 01/11/23  -AR      PT - Next Appointment 01/12/23  -AR      PT Goal Re-Cert Due Date 01/18/23  -AR            User Key  (r) = Recorded By, (t) = Taken By, (c) = Cosigned By    Initials Name Provider Type    Magalie Jensen PT Physical Therapist              Therapy Charges for Today     Code Description Service Date Service Provider Modifiers Qty    96265763558 HC PT EVAL MOD COMPLEXITY 4 1/11/2023 Magalie García, PT GP 1    77412321776 HC PT THER PROC EA 15 MIN 1/11/2023 Magalie García, PT GP 1    54493702168 HC PT THER SUPP EA 15 MIN 1/11/2023 Magalie García, PT GP 1          PT G-Codes  Outcome Measure Options: AM-PAC 6 Clicks Basic Mobility (PT)  AM-PAC 6 Clicks Score (PT): 16  PT Discharge Summary  Anticipated Discharge Disposition (PT): home with assist    Magalie García PT  1/11/2023

## 2023-01-11 NOTE — CONSULTS
Met with patient and family, discussed benefits of cardiac rehab. Patient familiar with program has attended previously.Provided phase II information along with the contact information for cardiac rehab here at [facility]. Plans to use home health. Explained if receiving home health would not be able to attend cardiac rehab until finished with home health. Patient will call when ready to schedule.

## 2023-01-11 NOTE — ANESTHESIA POSTPROCEDURE EVALUATION
Patient: Joshua Wolf    Procedure Summary     Date: 01/10/23 Room / Location: [address] CARDIOVASCULAR OPERATING ROOM    Anesthesia Start: 0637 Anesthesia Stop: 1154    Procedure: INTRAOPERATIVE CLAIR; STERNOTOMY; ATRIAL APPENDAGE CLOSURE; CORONARY ARTERY BYPASS GRAFT TIMES FOUR USING LEFT INTERNAL MAMMARY ARTERY GRAFT UTILIZING ENDOSCOPICALLY HARVESTED LEFT GREATER SAPHENOUS  VEIN AND PRP. (Chest) Diagnosis:       NSTEMI (non-ST elevated myocardial infarction) (Formerly [facility])      Abnormal findings on diagnostic imaging of heart and coronary circulation      (NSTEMI (non-ST elevated myocardial infarction) (HCC) [I21.4])      (Abnormal findings on diagnostic imaging of heart and coronary circulation [R93.1])    Surgeons: Dima Velasquez MD Provider: Luzma St MD    Anesthesia Type: general, [place], CVL, PAC ASA Status: 4          Anesthesia Type: general, [place], CVL, PAC    Vitals  Vitals Value Taken Time   /69 01/10/23 2231   Temp 37 °C (98.6 °F) 01/10/23 2244   Pulse 88 01/10/23 2244   Resp 17 01/10/23 2000   SpO2 96 % 01/10/23 2244   Vitals shown include unvalidated device data.        Anesthesia Post Evaluation

## 2023-01-11 NOTE — PROGRESS NOTES
" LOS: 4 days   Patient Care Team:  Eric Eubanks MD as PCP - General (Family Medicine)  Jamir Hughes MD as Consulting Physician (Nephrology)    Chief Complaint: post op    Subjective:  Symptoms:  No shortness of breath, cough or chest pain.    Diet:  Poor intake.  No nausea or vomiting.    Activity level: Returning to normal.    Pain:  He complains of pain that is mild.  Pain is well controlled.      Vital Signs  Temp:  [96.1 °F (35.6 °C)-99.1 °F (37.3 °C)] 99.1 °F (37.3 °C)  Heart Rate:  [70-89] 87  Resp:  [13-20] 20  BP: ()/(62-87) 115/71  Arterial Line BP: ()/(44-72) 115/57  FiO2 (%):  [100 %] 100 %  Body mass index is 36.55 kg/m².    Intake/Output Summary (Last 24 hours) at 1/11/2023 0738  Last data filed at 1/11/2023 0410  Gross per 24 hour   Intake 2900.09 ml   Output 1785 ml   Net 1115.09 ml     No intake/output data recorded.    Chest tube drainage last 8 hours: 50/0        01/08/23  0757 01/09/23  2254 01/10/23  0606   Weight: 111 kg (245 lb) 112 kg (247 lb 9.6 oz) 112 kg (247 lb 8 oz)     Objective:  General Appearance:  Comfortable and in no acute distress.    Vital signs: (most recent): Blood pressure 112/62, pulse 88, temperature 99.3 °F (37.4 °C), resp. rate 20, height 175.3 cm (69\"), weight 112 kg (247 lb 8 oz), SpO2 93 %.  Vital signs are normal.  No fever.    Output: Producing urine.    Lungs:  Normal effort and normal respiratory rate.  There are rales and decreased breath sounds.    Heart: Normal rate.  Regular rhythm.    Abdomen: Abdomen is soft.  Hypoactive bowel sounds.     Extremities: There is no dependent edema.    Pulses: Distal pulses are intact.    Neurological: Patient is alert and oriented to person, place and time.    Skin:  Warm and dry.  (Sternal dressing clean, dry, and intact)        Results Review:        WBC WBC   Date Value Ref Range Status   01/11/2023 8.92 3.40 - 10.80 10*3/mm3 Final   01/10/2023 9.05 3.40 - 10.80 10*3/mm3 Final   01/10/2023 11.35 (H) 3.40 " - 10.80 10*3/mm3 Final   01/09/2023 8.66 3.40 - 10.80 10*3/mm3 Final      HGB Hemoglobin   Date Value Ref Range Status   01/11/2023 11.9 (L) 13.0 - 17.7 g/dL Final   01/10/2023 12.3 (L) 13.0 - 17.7 g/dL Final   01/10/2023 13.2 13.0 - 17.7 g/dL Final   01/10/2023 10.9 (L) 12.0 - 17.0 g/dL Final   01/10/2023 11.2 (L) 12.0 - 17.0 g/dL Final   01/10/2023 11.2 (L) 12.0 - 17.0 g/dL Final   01/10/2023 12.2 12.0 - 17.0 g/dL Final   01/10/2023 11.6 (L) 12.0 - 17.0 g/dL Final   01/10/2023 14.3 12.0 - 17.0 g/dL Final   01/09/2023 15.8 13.0 - 17.7 g/dL Final      HCT Hematocrit   Date Value Ref Range Status   01/11/2023 33.0 (L) 37.5 - 51.0 % Final   01/10/2023 35.0 (L) 37.5 - 51.0 % Final   01/10/2023 39.1 37.5 - 51.0 % Final   01/10/2023 32 (L) 38 - 51 % Final   01/10/2023 33 (L) 38 - 51 % Final   01/10/2023 33 (L) 38 - 51 % Final   01/10/2023 36 (L) 38 - 51 % Final   01/10/2023 34 (L) 38 - 51 % Final   01/10/2023 42 38 - 51 % Final   01/09/2023 44.8 37.5 - 51.0 % Final      Platelets Platelets   Date Value Ref Range Status   01/11/2023 100 (L) 140 - 450 10*3/mm3 Final   01/10/2023 84 (L) 140 - 450 10*3/mm3 Final   01/10/2023 102 (L) 140 - 450 10*3/mm3 Final   01/09/2023 143 140 - 450 10*3/mm3 Final        PT/INR:    Protime   Date Value Ref Range Status   01/11/2023 15.5 (H) 11.7 - 14.2 Seconds Final   01/10/2023 16.7 (H) 11.7 - 14.2 Seconds Final   /  INR   Date Value Ref Range Status   01/11/2023 1.21 (H) 0.90 - 1.10 Final   01/10/2023 1.34 (H) 0.90 - 1.10 Final       Sodium Sodium   Date Value Ref Range Status   01/11/2023 138 136 - 145 mmol/L Final   01/10/2023 139 136 - 145 mmol/L Final   01/10/2023 136 136 - 145 mmol/L Final   01/10/2023 137 136 - 145 mmol/L Final      Potassium Potassium   Date Value Ref Range Status   01/11/2023 3.7 3.5 - 5.2 mmol/L Final   01/10/2023 3.9 3.5 - 5.2 mmol/L Final   01/10/2023 4.2 3.5 - 5.2 mmol/L Final     Comment:     Slight hemolysis detected by analyzer. Results may be affected.    01/10/2023 4.1 3.5 - 5.2 mmol/L Final     Comment:     Slight hemolysis detected by analyzer. Results may be affected.      Chloride Chloride   Date Value Ref Range Status   01/11/2023 105 98 - 107 mmol/L Final   01/10/2023 107 98 - 107 mmol/L Final   01/10/2023 104 98 - 107 mmol/L Final   01/10/2023 102 98 - 107 mmol/L Final      Bicarbonate CO2   Date Value Ref Range Status   01/11/2023 19.9 (L) 22.0 - 29.0 mmol/L Final   01/10/2023 18.1 (L) 22.0 - 29.0 mmol/L Final   01/10/2023 20.3 (L) 22.0 - 29.0 mmol/L Final   01/10/2023 23.9 22.0 - 29.0 mmol/L Final      BUN BUN   Date Value Ref Range Status   01/11/2023 22 8 - 23 mg/dL Final   01/10/2023 21 8 - 23 mg/dL Final   01/10/2023 23 8 - 23 mg/dL Final   01/10/2023 25 (H) 8 - 23 mg/dL Final      Creatinine Creatinine   Date Value Ref Range Status   01/11/2023 1.20 0.76 - 1.27 mg/dL Final   01/10/2023 1.34 (H) 0.76 - 1.27 mg/dL Final   01/10/2023 1.37 (H) 0.76 - 1.27 mg/dL Final   01/10/2023 1.25 0.76 - 1.27 mg/dL Final      Calcium Calcium   Date Value Ref Range Status   01/11/2023 8.6 8.6 - 10.5 mg/dL Final   01/10/2023 8.9 8.6 - 10.5 mg/dL Final   01/10/2023 10.0 8.6 - 10.5 mg/dL Final   01/10/2023 9.2 8.6 - 10.5 mg/dL Final      Magnesium Magnesium   Date Value Ref Range Status   01/11/2023 2.3 1.6 - 2.4 mg/dL Final   01/10/2023 2.7 (H) 1.6 - 2.4 mg/dL Final   01/10/2023 2.8 (H) 1.6 - 2.4 mg/dL Final          aspirin, 81 mg, Oral, Daily  atorvastatin, 40 mg, Oral, Nightly  ceFAZolin, 2 g, Intravenous, Q8H  chlorhexidine, 15 mL, Mouth/Throat, Q12H  enoxaparin, 40 mg, Subcutaneous, Q24H  insulin glargine, 20 Units, Subcutaneous, Once  metoclopramide, 10 mg, Intravenous, Q6H  metoprolol tartrate, 12.5 mg, Oral, Q12H  mupirocin, , Each Nare, BID  pantoprazole, 40 mg, Oral, QAM  senna-docusate sodium, 2 tablet, Oral, Nightly      amiodarone, 0.5 mg/min, Last Rate: Stopped (01/11/23 9909)  clevidipine, 2-32 mg/hr  dexmedetomidine, 0.2-1.5 mcg/kg/hr, Last Rate: Stopped  (01/10/23 1635)  DOPamine, 2-20 mcg/kg/min  EPINEPHrine, 0.02-0.1 mcg/kg/min  insulin, 0-100 Units/hr, Last Rate: 1.8 Units/hr (01/11/23 0007)  milrinone, 0.25-0.375 mcg/kg/min, Last Rate: 0.125 mcg/kg/min (01/11/23 0458)  niCARdipine, 5-15 mg/hr  nitroglycerin, 5-200 mcg/min  norepinephrine, 0.02-0.2 mcg/kg/min, Last Rate: 0.01 mcg/kg/min (01/11/23 0411)  phenylephrine, 0.2-2 mcg/kg/min  propofol, 5-50 mcg/kg/min, Last Rate: Stopped (01/10/23 1523)  sodium chloride, 30 mL/hr, Last Rate: 30 mL/hr (01/10/23 1231)        Patient Active Problem List   Diagnosis Code   • Obesity (BMI 30.0-34.9) E66.9   • CHF (congestive heart failure), NYHA class I ([facility]) I50.9   • Type 2 diabetes mellitus without complication ([facility]) E11.9   • Hyperlipidemia E78.5   • Coronary artery disease I25.10   • Anterior myocardial infarction ([facility]) I21.09   • Skin lesion L98.9   • Chest pain R07.9   • Chest pain, unspecified type R07.9   • NSTEMI (non-ST elevated myocardial infarction) ([facility]) I21.4   • Abnormal findings on diagnostic imaging of heart and coronary circulation R93.1       Assessment & Plan   -NSTEMI, Severe multivessel CAD s/p CABGx4 LIMA/LSVG; SUDHIR closure POD#1 [place]  -Poorly controlled diabetes type 2 A1C 12  -Elevated right hemidiaphragm  -Hyperlipidemia--statin  -paroxsymal atrial fibrillation   - CKD III  -Obesity  - post-op anemia--expected acute blood loss  - TCP--consumptive    Looks good this morning, sitting up in the chair  Remains on 0.125 of milrinone and last 2.2. Will leave milrinone today  On 0.01 of levophed. Replete calcium and wean gtt as able  On 4LNC--wean as able  Remains in SR with occasional PACs  CXR with vascular congestion--gentle IV diuresis  Mobilize/encourage pulmonary toilet--add mucinex/flutter  Possible transfer to stepdown later today if able to wean levophed  Continue routine care    Radha Madrid, APRN  01/11/23  07:38 EST

## 2023-01-12 ENCOUNTER — APPOINTMENT (OUTPATIENT)
Dept: GENERAL RADIOLOGY | Facility: HOSPITAL | Age: 68
DRG: 234 | End: 2023-01-12
Payer: MEDICARE

## 2023-01-12 LAB
ANION GAP SERPL CALCULATED.3IONS-SCNC: 11.2 MMOL/L (ref 5–15)
BUN SERPL-MCNC: 19 MG/DL (ref 8–23)
BUN/CREAT SERPL: 15.4 (ref 7–25)
CALCIUM SPEC-SCNC: 8.7 MG/DL (ref 8.6–10.5)
CHLORIDE SERPL-SCNC: 101 MMOL/L (ref 98–107)
CO2 SERPL-SCNC: 22.8 MMOL/L (ref 22–29)
CREAT SERPL-MCNC: 1.23 MG/DL (ref 0.76–1.27)
DEPRECATED RDW RBC AUTO: 38 FL (ref 37–54)
EGFRCR SERPLBLD CKD-EPI 2021: 65.2 ML/MIN/1.73
ERYTHROCYTE [DISTWIDTH] IN BLOOD BY AUTOMATED COUNT: 12.3 % (ref 12.3–15.4)
GLUCOSE BLDC GLUCOMTR-MCNC: 186 MG/DL (ref 70–130)
GLUCOSE BLDC GLUCOMTR-MCNC: 191 MG/DL (ref 70–130)
GLUCOSE BLDC GLUCOMTR-MCNC: 195 MG/DL (ref 70–130)
GLUCOSE BLDC GLUCOMTR-MCNC: 223 MG/DL (ref 70–130)
GLUCOSE SERPL-MCNC: 175 MG/DL (ref 65–99)
HCT VFR BLD AUTO: 35.8 % (ref 37.5–51)
HGB BLD-MCNC: 12.7 G/DL (ref 13–17.7)
MCH RBC QN AUTO: 30.4 PG (ref 26.6–33)
MCHC RBC AUTO-ENTMCNC: 35.5 G/DL (ref 31.5–35.7)
MCV RBC AUTO: 85.6 FL (ref 79–97)
PLATELET # BLD AUTO: 79 10*3/MM3 (ref 140–450)
PMV BLD AUTO: 10.9 FL (ref 6–12)
POTASSIUM SERPL-SCNC: 4.1 MMOL/L (ref 3.5–5.2)
QT INTERVAL: 348 MS
QT INTERVAL: 391 MS
RBC # BLD AUTO: 4.18 10*6/MM3 (ref 4.14–5.8)
SODIUM SERPL-SCNC: 135 MMOL/L (ref 136–145)
WBC NRBC COR # BLD: 7.84 10*3/MM3 (ref 3.4–10.8)

## 2023-01-12 PROCEDURE — 97116 GAIT TRAINING THERAPY: CPT

## 2023-01-12 PROCEDURE — 63710000001 INSULIN LISPRO (HUMAN) PER 5 UNITS

## 2023-01-12 PROCEDURE — 71045 X-RAY EXAM CHEST 1 VIEW: CPT

## 2023-01-12 PROCEDURE — 25010000002 AMIODARONE IN DEXTROSE 5% 150-4.21 MG/100ML-% SOLUTION

## 2023-01-12 PROCEDURE — 25010000002 MAGNESIUM SULFATE IN D5W 1G/100ML (PREMIX) 1-5 GM/100ML-% SOLUTION

## 2023-01-12 PROCEDURE — 80048 BASIC METABOLIC PNL TOTAL CA: CPT

## 2023-01-12 PROCEDURE — 25010000002 FUROSEMIDE PER 20 MG

## 2023-01-12 PROCEDURE — 93005 ELECTROCARDIOGRAM TRACING: CPT | Performed by: NURSE PRACTITIONER

## 2023-01-12 PROCEDURE — 97530 THERAPEUTIC ACTIVITIES: CPT

## 2023-01-12 PROCEDURE — 25010000002 CEFAZOLIN IN DEXTROSE 2-4 GM/100ML-% SOLUTION

## 2023-01-12 PROCEDURE — 25010000002 AMIODARONE IN DEXTROSE 5% 360-4.14 MG/200ML-% SOLUTION

## 2023-01-12 PROCEDURE — 85027 COMPLETE CBC AUTOMATED: CPT

## 2023-01-12 PROCEDURE — 82962 GLUCOSE BLOOD TEST: CPT

## 2023-01-12 PROCEDURE — 93010 ELECTROCARDIOGRAM REPORT: CPT | Performed by: INTERNAL MEDICINE

## 2023-01-12 RX ORDER — POTASSIUM CHLORIDE 750 MG/1
20 TABLET, FILM COATED, EXTENDED RELEASE ORAL ONCE
Status: COMPLETED | OUTPATIENT
Start: 2023-01-12 | End: 2023-01-12

## 2023-01-12 RX ORDER — IPRATROPIUM BROMIDE AND ALBUTEROL SULFATE 2.5; .5 MG/3ML; MG/3ML
3 SOLUTION RESPIRATORY (INHALATION) EVERY 4 HOURS PRN
Status: DISCONTINUED | OUTPATIENT
Start: 2023-01-12 | End: 2023-01-16 | Stop reason: HOSPADM

## 2023-01-12 RX ORDER — FUROSEMIDE 10 MG/ML
40 INJECTION INTRAMUSCULAR; INTRAVENOUS ONCE
Status: COMPLETED | OUTPATIENT
Start: 2023-01-12 | End: 2023-01-12

## 2023-01-12 RX ORDER — INSULIN LISPRO 100 [IU]/ML
8 INJECTION, SOLUTION INTRAVENOUS; SUBCUTANEOUS
Status: DISCONTINUED | OUTPATIENT
Start: 2023-01-13 | End: 2023-01-14

## 2023-01-12 RX ORDER — MAGNESIUM SULFATE 1 G/100ML
1 INJECTION INTRAVENOUS ONCE
Status: COMPLETED | OUTPATIENT
Start: 2023-01-12 | End: 2023-01-12

## 2023-01-12 RX ADMIN — INSULIN LISPRO 2 UNITS: 100 INJECTION, SOLUTION INTRAVENOUS; SUBCUTANEOUS at 08:09

## 2023-01-12 RX ADMIN — METOPROLOL TARTRATE 25 MG: 25 TABLET, FILM COATED ORAL at 20:06

## 2023-01-12 RX ADMIN — GUAIFENESIN 1200 MG: 600 TABLET, EXTENDED RELEASE ORAL at 08:08

## 2023-01-12 RX ADMIN — MAGNESIUM SULFATE IN DEXTROSE 1 G: 10 INJECTION, SOLUTION INTRAVENOUS at 10:22

## 2023-01-12 RX ADMIN — MUPIROCIN 1 APPLICATION: 20 OINTMENT TOPICAL at 08:10

## 2023-01-12 RX ADMIN — POTASSIUM CHLORIDE 20 MEQ: 750 TABLET, EXTENDED RELEASE ORAL at 10:21

## 2023-01-12 RX ADMIN — INSULIN LISPRO 2 UNITS: 100 INJECTION, SOLUTION INTRAVENOUS; SUBCUTANEOUS at 18:31

## 2023-01-12 RX ADMIN — INSULIN LISPRO 2 UNITS: 100 INJECTION, SOLUTION INTRAVENOUS; SUBCUTANEOUS at 13:22

## 2023-01-12 RX ADMIN — ASPIRIN 81 MG: 81 TABLET, COATED ORAL at 08:08

## 2023-01-12 RX ADMIN — GUAIFENESIN 1200 MG: 600 TABLET, EXTENDED RELEASE ORAL at 20:06

## 2023-01-12 RX ADMIN — CEFAZOLIN SODIUM 2 G: 2 INJECTION, SOLUTION INTRAVENOUS at 02:31

## 2023-01-12 RX ADMIN — HYDROCODONE BITARTRATE AND ACETAMINOPHEN 2 TABLET: 5; 325 TABLET ORAL at 00:15

## 2023-01-12 RX ADMIN — AMIODARONE HYDROCHLORIDE 1 MG/MIN: 1.8 INJECTION, SOLUTION INTRAVENOUS at 10:43

## 2023-01-12 RX ADMIN — INSULIN GLARGINE-YFGN 20 UNITS: 100 INJECTION, SOLUTION SUBCUTANEOUS at 20:06

## 2023-01-12 RX ADMIN — INSULIN LISPRO 5 UNITS: 100 INJECTION, SOLUTION INTRAVENOUS; SUBCUTANEOUS at 13:22

## 2023-01-12 RX ADMIN — AMIODARONE HYDROCHLORIDE 150 MG: 1.5 INJECTION, SOLUTION INTRAVENOUS at 10:21

## 2023-01-12 RX ADMIN — HYDROCODONE BITARTRATE AND ACETAMINOPHEN 2 TABLET: 5; 325 TABLET ORAL at 05:14

## 2023-01-12 RX ADMIN — HYDROCODONE BITARTRATE AND ACETAMINOPHEN 2 TABLET: 5; 325 TABLET ORAL at 15:13

## 2023-01-12 RX ADMIN — INSULIN LISPRO 5 UNITS: 100 INJECTION, SOLUTION INTRAVENOUS; SUBCUTANEOUS at 18:32

## 2023-01-12 RX ADMIN — FUROSEMIDE 40 MG: 10 INJECTION, SOLUTION INTRAMUSCULAR; INTRAVENOUS at 10:21

## 2023-01-12 RX ADMIN — ATORVASTATIN CALCIUM 80 MG: 80 TABLET, FILM COATED ORAL at 20:06

## 2023-01-12 RX ADMIN — CHLORHEXIDINE GLUCONATE 15 ML: 1.2 SOLUTION ORAL at 08:09

## 2023-01-12 RX ADMIN — MUPIROCIN 1 APPLICATION: 20 OINTMENT TOPICAL at 20:06

## 2023-01-12 RX ADMIN — INSULIN LISPRO 4 UNITS: 100 INJECTION, SOLUTION INTRAVENOUS; SUBCUTANEOUS at 20:07

## 2023-01-12 RX ADMIN — CHLORHEXIDINE GLUCONATE 15 ML: 1.2 SOLUTION ORAL at 20:06

## 2023-01-12 RX ADMIN — PANTOPRAZOLE SODIUM 40 MG: 40 TABLET, DELAYED RELEASE ORAL at 06:18

## 2023-01-12 RX ADMIN — DOCUSATE SODIUM 50MG AND SENNOSIDES 8.6MG 2 TABLET: 8.6; 5 TABLET, FILM COATED ORAL at 20:06

## 2023-01-12 RX ADMIN — HYDROCODONE BITARTRATE AND ACETAMINOPHEN 2 TABLET: 5; 325 TABLET ORAL at 09:29

## 2023-01-12 RX ADMIN — HYDROCODONE BITARTRATE AND ACETAMINOPHEN 2 TABLET: 5; 325 TABLET ORAL at 20:06

## 2023-01-12 RX ADMIN — AMIODARONE HYDROCHLORIDE 0.5 MG/MIN: 1.8 INJECTION, SOLUTION INTRAVENOUS at 16:39

## 2023-01-12 RX ADMIN — METOPROLOL TARTRATE 25 MG: 25 TABLET, FILM COATED ORAL at 08:08

## 2023-01-12 RX ADMIN — INSULIN LISPRO 5 UNITS: 100 INJECTION, SOLUTION INTRAVENOUS; SUBCUTANEOUS at 08:09

## 2023-01-12 NOTE — PLAN OF CARE
Goal Outcome Evaluation:   Pt alert, Ox4, VSS, XPM maintained and pt converted to rate controlled a-fib when he started over riding the pacer. CT and ramos maintained and poutputs documented in the chart. Pain managed with eMAR, no complaints during the shift.

## 2023-01-12 NOTE — THERAPY TREATMENT NOTE
Patient Name: Joshua Wolf  : 1955    MRN: [id number]                              Today's Date: 2023       Admit Date: 2023    Visit Dx:     ICD-10-CM ICD-9-CM   1. NSTEMI (non-ST elevated myocardial infarction) (HCC)  I21.4 410.70   2. Chest pain, unspecified type  R07.9 786.50   3. Abnormal findings on diagnostic imaging of heart and coronary circulation  R93.1 794.39   4. S/P CABG (coronary artery bypass graft)  Z95.1 V45.81     Patient Active Problem List   Diagnosis   • Obesity (BMI 30.0-34.9)   • CHF (congestive heart failure), NYHA class I (HCC)   • Type 2 diabetes mellitus without complication (HCC)   • Hyperlipidemia   • Coronary artery disease   • Anterior myocardial infarction (HCC)   • Skin lesion   • Chest pain   • Chest pain, unspecified type   • NSTEMI (non-ST elevated myocardial infarction) ([facility])   • Abnormal findings on diagnostic imaging of heart and coronary circulation     Past Medical History:   Diagnosis Date   • CHF (congestive heart failure) (HCC)    • Coronary artery disease    • Diabetes mellitus (HCC)    • Hyperlipidemia    • Myocardial infarction (HCC)    • Paroxysmal A-fib ([facility])    • Seizures ([facility])      Past Surgical History:   Procedure Laterality Date   • CARDIAC CATHETERIZATION N/A 2016    Procedure: Left Heart Cath;  Surgeon: Bella Clinton MD;  Location: [facility] INVASIVE LOCATION;  Service:    • CARDIAC CATHETERIZATION N/A 10/30/2017    Procedure: Left Heart Cath;  Surgeon: Zully Gregg MD;  Location: [facility] CATH INVASIVE LOCATION;  Service:    • CARDIAC CATHETERIZATION N/A 10/30/2017    Procedure: Coronary angiography;  Surgeon: Zully Gregg MD;  Location: [facility] CATH INVASIVE LOCATION;  Service:    • CARDIAC CATHETERIZATION N/A 10/30/2017    Procedure: Stent SANIA coronary;  Surgeon: Zully Gregg MD;  Location: [facility] CATH INVASIVE LOCATION;  Service:    • CARDIAC CATHETERIZATION N/A 2023    Procedure: LEFT HEART CATH;  Surgeon:  Bernardo Dominguez MD;  Location: [facility] CATH INVASIVE LOCATION;  Service: Cardiovascular;  Laterality: N/A;   • CARDIAC CATHETERIZATION N/A 1/6/2023    Procedure: CORONARY ANGIOGRAPHY;  Surgeon: Bernardo Dominguez MD;  Location: [facility] CATH INVASIVE LOCATION;  Service: Cardiovascular;  Laterality: N/A;   • CARDIAC CATHETERIZATION N/A 1/6/2023    Procedure: Resting Full Cycle Ratio;  Surgeon: Bernardo Dominguez MD;  Location: [facility] CATH INVASIVE LOCATION;  Service: Cardiovascular;  Laterality: N/A;   • CARDIAC CATHETERIZATION N/A 1/6/2023    Procedure: Left ventriculography;  Surgeon: Bernardo Dominguez MD;  Location: [facility] CATH INVASIVE LOCATION;  Service: Cardiovascular;  Laterality: N/A;   • CORONARY ARTERY BYPASS GRAFT N/A 1/10/2023    Procedure: INTRAOPERATIVE CLAIR; STERNOTOMY; ATRIAL APPENDAGE CLOSURE; CORONARY ARTERY BYPASS GRAFT TIMES FOUR USING LEFT INTERNAL MAMMARY ARTERY GRAFT UTILIZING ENDOSCOPICALLY HARVESTED LEFT GREATER SAPHENOUS  VEIN AND PRP.;  Surgeon: Dima Velasquez MD;  Location: [facility];  Service: Cardiothoracic;  Laterality: N/A;   • NM RT/LT HEART CATHETERS N/A 10/30/2017    Procedure: Percutaneous Coronary Intervention;  Surgeon: Zully Gregg MD;  Location: [facility] CATH INVASIVE LOCATION;  Service: Cardiovascular      General Information     Row Name 01/12/23 1419          Physical Therapy Time and Intention    Document Type therapy note (daily note)  -DJ     Mode of Treatment individual therapy;physical therapy  -DJ     Row Name 01/12/23 1419          General Information    Patient Profile Reviewed yes  -DJ     Existing Precautions/Restrictions fall;sternal  -DJ     Row Name 01/12/23 1419          Cognition    Orientation Status (Cognition) oriented x 3  -DJ     Row Name 01/12/23 1419          Safety Issues, Functional Mobility    Comment, Safety Issues/Impairments (Mobility) nonskid socks  -DJ           User Key  (r) = Recorded By, (t) = Taken By, (c) = Cosigned By    Initials Name  Provider Type    Bhakti Waterman, LILLIAM Physical Therapist               Mobility     Row Name 01/12/23 1421          Bed Mobility    Bed Mobility sit-supine  -DJ     Supine-Sit [place] (Bed Mobility) not tested  -DJ     Sit-Supine [place] (Bed Mobility) minimum assist (75% patient effort);verbal cues;1 person assist;1 person to manage equipment  -DJ     Assistive Device (Bed Mobility) bed rails;head of bed elevated  -DJ     Comment, (Bed Mobility) able to reposition self in bed with vc  -DJ     Row Name 01/12/23 1421          Transfers    Comment, (Transfers) sit/stand from recliner  -DJ     Row Name 01/12/23 1421          Bed-Chair Transfer    Bed-Chair [place] (Transfers) not tested  -DJ     Row Name 01/12/23 1421          Sit-Stand Transfer    Sit-Stand [place] (Transfers) contact guard;verbal cues  -DJ     Row Name 01/12/23 1421          Gait/Stairs (Locomotion)    [place] Level (Gait) contact guard;verbal cues;1 person assist;1 person to manage equipment  -DJ     Distance in Feet (Gait) 300'  -DJ     Deviations/Abnormal Patterns (Gait) gait speed decreased;antalgic;brenda decreased;base of support, wide  -DJ     Bilateral Gait Deviations forward flexed posture  -DJ     [place] Level (Stairs) not tested  -DJ     Comment, (Gait/Stairs) Pt amb 300' with CGA of 2 - slow pace, flexed posture, endurance improving, good balance. Pt fatigued after amb but did not req rest break  -DJ           User Key  (r) = Recorded By, (t) = Taken By, (c) = Cosigned By    Initials Name Provider Type    Bhakti Waterman PT Physical Therapist               Obj/Interventions     Row Name 01/12/23 1432 01/12/23 1423       Motor Skills    Motor Skills functional endurance  -DJ functional endurance  -DJ    Functional Endurance improving  -DJ limits further amb  -DJ    Therapeutic Exercise -- other (see comments)  seated AP, hip flex  -DJ    Row Name 01/12/23 1432 01/12/23 1423       Balance    Balance  Assessment -- standing static balance;standing dynamic balance  -DJ    Static Standing Balance contact guard;verbal cues  -DJ contact guard;verbal cues  -DJ    Dynamic Standing Balance contact guard;verbal cues  -DJ contact guard;verbal cues  -DJ    Position/Device Used, Standing Balance supported  -DJ supported  -DJ    Balance Interventions -- sitting;standing;sit to stand;weight shifting activity  -DJ    Comment, Balance fair  -DJ slightly unsteady  -DJ          User Key  (r) = Recorded By, (t) = Taken By, (c) = Cosigned By    Initials Name Provider Type    Bhakti Waterman, PT Physical Therapist               Goals/Plan    No documentation.                Clinical Impression     Row Name 01/12/23 1423          Pain    Pre/Posttreatment Pain Comment expected c/o incisional chest pain  -DJ     Pain Intervention(s) Repositioned;Rest  -DJ     Row Name 01/12/23 1423          Plan of Care Review    Plan of Care Reviewed With patient  -DJ     Progress improving  -DJ     Outcome Evaluation Pt resting in chair in NAD, pleasant and cooperative. Pt req CGA of 2 to stand from recliner.  Pt amb 300' with CGA of 2 - slow pace, flexed posture, endurance improving, good balance. Pt fatigued after amb but did not req rest break. Per Nurse request, pt returned to bed for lines to be removed. Pt req min A to return supine and reposition in bed. Pt encouraged to amb [facility] nsg staff throughout the day. Pt dem sig improvement in amb distance today. Cont PT to progress mobility as tolerated and prepare for d/c home with wife.  -DJ     Row Name 01/12/23 1423          Therapy Assessment/Plan (PT)    Patient/Family Therapy Goals Statement (PT) home  -DJ     Criteria for Skilled Interventions Met (PT) skilled treatment is necessary  -DJ     Row Name 01/12/23 1423          Vital Signs    O2 Delivery Pre Treatment supplemental O2  2L  -DJ     O2 Delivery Intra Treatment supplemental O2  2L  -DJ     Post SpO2 (%) 90  -DJ     O2 Delivery Post  Treatment supplemental O2  2L  -DJ     Pre Patient Position Sitting  -DJ     Intra Patient Position Standing  -DJ     Post Patient Position Supine  -DJ     Row Name 01/12/23 1423          Positioning and Restraints    Pre-Treatment Position sitting in chair/recliner  -DJ     Post Treatment Position bed  -DJ     In Bed supine;call light within reach;encouraged to call for assist;exit alarm on  -DJ           User Key  (r) = Recorded By, (t) = Taken By, (c) = Cosigned By    Initials Name Provider Type    Bhakti Waterman, PT Physical Therapist               Outcome Measures     Row Name 01/12/23 1427 01/12/23 0812       How much help from another person do you currently need...    Turning from your back to your side while in flat bed without using bedrails? 3  -DJ 3  -PC    Moving from lying on back to sitting on the side of a flat bed without bedrails? 3  -DJ 2  -PC    Moving to and from a bed to a chair (including a wheelchair)? 3  -DJ 3  -PC    Standing up from a chair using your arms (e.g., wheelchair, bedside chair)? 3  -DJ 2  -PC    Climbing 3-5 steps with a railing? 2  -DJ 3  -PC    To walk in hospital room? 3  -DJ 2  -PC    AM-PAC 6 Clicks Score (PT) 17  -DJ 15  -PC    Highest level of mobility 5 --> Static standing  -DJ 4 --> Transferred to chair/commode  -PC    Row Name 01/12/23 1427          Functional Assessment    Outcome Measure Options AM-PAC 6 Clicks Basic Mobility (PT)  -DJ           User Key  (r) = Recorded By, (t) = Taken By, (c) = Cosigned By    Initials Name Provider Type    Da Joyce, RN Registered Nurse    Bhakti Waterman, PT Physical Therapist                             Physical Therapy Education     Title: PT OT SLP Therapies (Done)     Topic: Physical Therapy (Done)     Point: Mobility training (Done)     Learning Progress Summary           Patient Acceptance, E, VU by MURTAZA at 1/12/2023 1437    Acceptance, E, VU,NR by MURTAZA at 1/12/2023 1428    Acceptance, E, NR by AR at 1/11/2023 1131                    Point: Home exercise program (Done)     Learning Progress Summary           Patient Acceptance, E, VU,NR by DJ at 1/12/2023 1428    Acceptance, E, NR by AR at 1/11/2023 1131                   Point: Body mechanics (Done)     Learning Progress Summary           Patient Acceptance, E, VU by DJ at 1/12/2023 1437    Acceptance, E, VU,NR by DJ at 1/12/2023 1428    Acceptance, E, NR by AR at 1/11/2023 1131                   Point: Precautions (Done)     Learning Progress Summary           Patient Acceptance, E, VU by DJ at 1/12/2023 1437    Acceptance, E, VU,NR by DJ at 1/12/2023 1428    Acceptance, E, NR by AR at 1/11/2023 1131                               User Key     Initials Effective Dates Name Provider Type Discipline    AR 06/16/21 -  Magalie García, PT Physical Therapist PT    DJ 10/25/19 -  Bhakti Oliveira, PT Physical Therapist PT              PT Recommendation and Plan     Plan of Care Reviewed With: patient  Progress: improving  Outcome Evaluation: Pt resting in chair in NAD, pleasant and cooperative. Pt req CGA of 2 to stand from recliner.  Pt amb 300' with CGA of 2 - slow pace, flexed posture, endurance improving, good balance. Pt fatigued after amb but did not req rest break. Per Nurse request, pt returned to bed for lines to be removed. Pt req min A to return supine and reposition in bed. Pt encouraged to amb [facility] nsg staff throughout the day. Pt dem sig improvement in amb distance today. Cont PT to progress mobility as tolerated and prepare for d/c home with wife.     Time Calculation:    PT Charges     Row Name 01/12/23 1438 01/12/23 1428          Time Calculation    Start Time 1325  -DJ 1359  -DJ     Stop Time 1354  -DJ --     Time Calculation (min) 29 min  -DJ --     PT Non-Billable Time (min) 10 min  -DJ --     PT Received On 01/12/23  -DJ --     PT - Next Appointment 01/13/23  -DJ --           User Key  (r) = Recorded By, (t) = Taken By, (c) = Cosigned By    Initials Name Provider Type     Bhakti Waterman, PT Physical Therapist              Therapy Charges for Today     Code Description Service Date Service Provider Modifiers Qty    38889996261 HC PT THERAPEUTIC ACT EA 15 MIN 1/12/2023 Bhakti Oliveira, PT GP 1    90020048081 HC GAIT TRAINING EA 15 MIN 1/12/2023 Bhakti Oliveira, PT GP 1    94325484029 HC PT THER SUPP EA 15 MIN 1/12/2023 Bhakti Oliveira, PT GP 2          PT G-Codes  Outcome Measure Options: AM-PAC 6 Clicks Basic Mobility (PT)  AM-PAC 6 Clicks Score (PT): 17  PT Discharge Summary  Anticipated Discharge Disposition (PT): home with home health    Bhakti Oliveira, PT  1/12/2023

## 2023-01-12 NOTE — PROGRESS NOTES
" LOS: 5 days   Patient Care Team:  Eric Eubanks MD as PCP - General (Family Medicine)  Jamir Hughes MD as Consulting Physician (Nephrology)    Chief Complaint: post op    Subjective:  Symptoms:  Improved.  He reports shortness of breath and cough.    Diet:  Poor intake.  No vomiting.    Activity level: Impaired due to weakness.    Pain:  Pain is well controlled.      Vital Signs  Temp:  [97.9 °F (36.6 °C)-100.2 °F (37.9 °C)] 99 °F (37.2 °C)  Heart Rate:  [] 114  Resp:  [16-18] 17  BP: (117-146)/(72-89) 132/83  Arterial Line BP: (112)/(57) 112/57  Body mass index is 36.79 kg/m².    Intake/Output Summary (Last 24 hours) at 1/12/2023 0831  Last data filed at 1/12/2023 0600  Gross per 24 hour   Intake 600 ml   Output 1745 ml   Net -1145 ml     No intake/output data recorded.    Chest tube drainage last 8 hours: 50/25        01/09/23  2254 01/10/23  0606 01/11/23  0600   Weight: 112 kg (247 lb 9.6 oz) 112 kg (247 lb 8 oz) 113 kg (249 lb 1.9 oz)     Objective:  General Appearance:  Comfortable and in no acute distress.    Vital signs: (most recent): Blood pressure 132/83, pulse 114, temperature 99 °F (37.2 °C), temperature source Oral, resp. rate 17, height 175.3 cm (69\"), weight 113 kg (249 lb 1.9 oz), SpO2 95 %.  No fever.    Output: Producing urine.    Lungs:  Normal effort and normal respiratory rate.  There are rales and decreased breath sounds.    Heart: Regular rhythm.    Abdomen: Abdomen is soft.  Hypoactive bowel sounds.     Extremities: There is no dependent edema.    Pulses: Distal pulses are intact.    Neurological: Patient is alert and oriented to person, place and time.    Skin:  Warm and dry.  (Sternal dressing clean, dry, and intact)        Results Review:        WBC WBC   Date Value Ref Range Status   01/12/2023 7.84 3.40 - 10.80 10*3/mm3 Final   01/11/2023 8.92 3.40 - 10.80 10*3/mm3 Final   01/10/2023 9.05 3.40 - 10.80 10*3/mm3 Final   01/10/2023 11.35 (H) 3.40 - 10.80 10*3/mm3 Final    "   HGB Hemoglobin   Date Value Ref Range Status   01/12/2023 12.7 (L) 13.0 - 17.7 g/dL Final   01/11/2023 11.9 (L) 13.0 - 17.7 g/dL Final   01/10/2023 12.3 (L) 13.0 - 17.7 g/dL Final   01/10/2023 13.2 13.0 - 17.7 g/dL Final   01/10/2023 10.9 (L) 12.0 - 17.0 g/dL Final   01/10/2023 11.2 (L) 12.0 - 17.0 g/dL Final   01/10/2023 11.2 (L) 12.0 - 17.0 g/dL Final   01/10/2023 12.2 12.0 - 17.0 g/dL Final   01/10/2023 11.6 (L) 12.0 - 17.0 g/dL Final   01/10/2023 14.3 12.0 - 17.0 g/dL Final      HCT Hematocrit   Date Value Ref Range Status   01/12/2023 35.8 (L) 37.5 - 51.0 % Final   01/11/2023 33.0 (L) 37.5 - 51.0 % Final   01/10/2023 35.0 (L) 37.5 - 51.0 % Final   01/10/2023 39.1 37.5 - 51.0 % Final   01/10/2023 32 (L) 38 - 51 % Final   01/10/2023 33 (L) 38 - 51 % Final   01/10/2023 33 (L) 38 - 51 % Final   01/10/2023 36 (L) 38 - 51 % Final   01/10/2023 34 (L) 38 - 51 % Final   01/10/2023 42 38 - 51 % Final      Platelets Platelets   Date Value Ref Range Status   01/12/2023 79 (L) 140 - 450 10*3/mm3 Final   01/11/2023 100 (L) 140 - 450 10*3/mm3 Final   01/10/2023 84 (L) 140 - 450 10*3/mm3 Final   01/10/2023 102 (L) 140 - 450 10*3/mm3 Final        PT/INR:    Protime   Date Value Ref Range Status   01/11/2023 15.5 (H) 11.7 - 14.2 Seconds Final   01/10/2023 16.7 (H) 11.7 - 14.2 Seconds Final   /  INR   Date Value Ref Range Status   01/11/2023 1.21 (H) 0.90 - 1.10 Final   01/10/2023 1.34 (H) 0.90 - 1.10 Final       Sodium Sodium   Date Value Ref Range Status   01/12/2023 135 (L) 136 - 145 mmol/L Final   01/11/2023 138 136 - 145 mmol/L Final   01/10/2023 139 136 - 145 mmol/L Final   01/10/2023 136 136 - 145 mmol/L Final   01/10/2023 137 136 - 145 mmol/L Final      Potassium Potassium   Date Value Ref Range Status   01/12/2023 4.1 3.5 - 5.2 mmol/L Final   01/11/2023 3.7 3.5 - 5.2 mmol/L Final   01/10/2023 3.9 3.5 - 5.2 mmol/L Final   01/10/2023 4.2 3.5 - 5.2 mmol/L Final     Comment:     Slight hemolysis detected by analyzer.  Results may be affected.   01/10/2023 4.1 3.5 - 5.2 mmol/L Final     Comment:     Slight hemolysis detected by analyzer. Results may be affected.      Chloride Chloride   Date Value Ref Range Status   01/12/2023 101 98 - 107 mmol/L Final   01/11/2023 105 98 - 107 mmol/L Final   01/10/2023 107 98 - 107 mmol/L Final   01/10/2023 104 98 - 107 mmol/L Final   01/10/2023 102 98 - 107 mmol/L Final      Bicarbonate CO2   Date Value Ref Range Status   01/12/2023 22.8 22.0 - 29.0 mmol/L Final   01/11/2023 19.9 (L) 22.0 - 29.0 mmol/L Final   01/10/2023 18.1 (L) 22.0 - 29.0 mmol/L Final   01/10/2023 20.3 (L) 22.0 - 29.0 mmol/L Final   01/10/2023 23.9 22.0 - 29.0 mmol/L Final      BUN BUN   Date Value Ref Range Status   01/12/2023 19 8 - 23 mg/dL Final   01/11/2023 22 8 - 23 mg/dL Final   01/10/2023 21 8 - 23 mg/dL Final   01/10/2023 23 8 - 23 mg/dL Final   01/10/2023 25 (H) 8 - 23 mg/dL Final      Creatinine Creatinine   Date Value Ref Range Status   01/12/2023 1.23 0.76 - 1.27 mg/dL Final   01/11/2023 1.20 0.76 - 1.27 mg/dL Final   01/10/2023 1.34 (H) 0.76 - 1.27 mg/dL Final   01/10/2023 1.37 (H) 0.76 - 1.27 mg/dL Final   01/10/2023 1.25 0.76 - 1.27 mg/dL Final      Calcium Calcium   Date Value Ref Range Status   01/12/2023 8.7 8.6 - 10.5 mg/dL Final   01/11/2023 8.6 8.6 - 10.5 mg/dL Final   01/10/2023 8.9 8.6 - 10.5 mg/dL Final   01/10/2023 10.0 8.6 - 10.5 mg/dL Final   01/10/2023 9.2 8.6 - 10.5 mg/dL Final      Magnesium Magnesium   Date Value Ref Range Status   01/11/2023 2.3 1.6 - 2.4 mg/dL Final   01/10/2023 2.7 (H) 1.6 - 2.4 mg/dL Final   01/10/2023 2.8 (H) 1.6 - 2.4 mg/dL Final          aspirin, 81 mg, Oral, Daily  atorvastatin, 80 mg, Oral, Nightly  chlorhexidine, 15 mL, Mouth/Throat, Q12H  enoxaparin, 40 mg, Subcutaneous, Q24H  guaiFENesin, 1,200 mg, Oral, Q12H  insulin glargine, 15 Units, Subcutaneous, Nightly  insulin lispro, 0-9 Units, Subcutaneous, 4x Daily With Meals & Nightly  insulin lispro, 5 Units,  Subcutaneous, TID With Meals  metoprolol tartrate, 25 mg, Oral, Q12H  mupirocin, , Each Nare, BID  pantoprazole, 40 mg, Oral, QAM  senna-docusate sodium, 2 tablet, Oral, Nightly           Patient Active Problem List   Diagnosis Code   • Obesity (BMI 30.0-34.9) E66.9   • CHF (congestive heart failure), NYHA class I ([facility]) I50.9   • Type 2 diabetes mellitus without complication ([facility]) E11.9   • Hyperlipidemia E78.5   • Coronary artery disease I25.10   • Anterior myocardial infarction ([facility]) I21.09   • Skin lesion L98.9   • Chest pain R07.9   • Chest pain, unspecified type R07.9   • NSTEMI (non-ST elevated myocardial infarction) ([facility]) I21.4   • Abnormal findings on diagnostic imaging of heart and coronary circulation R93.1       Assessment & Plan   -NSTEMI, Severe multivessel CAD s/p CABGx4 LIMA/LSVG; SUDHIR closure POD#2 [place]  -Poorly controlled diabetes type 2 A1C 12  -Elevated right hemidiaphragm  -Hyperlipidemia--statin  -paroxsymal atrial fibrillation   -CKD III  -Obesity  - post-op anemia--expected acute blood loss  - TCP--consumptive; holding lovenox    Looks good this morning, sitting up in the chair  Went into atrial fibrillation this morning, rate 120s -- beta blocker increased and add IV amiodarone, and gram of magnesium; pacer to VVI backup    On 4LNC--wean as able; mobilize/encourage pulmonary toilet--add mucinex/flutter; add duonebs for wheezing  Weight still up, continue gentle IV diuresis  Blood glucose remains elevated; increase lantus  D/c chest tubes  D/c ramos catheter   Keep IJ today for IV amiodarone  Continue routine care    Savanah Valdez, APRN  01/12/23  08:31 EST

## 2023-01-12 NOTE — PLAN OF CARE
Goal Outcome Evaluation:  Plan of Care Reviewed With: patient        Progress: no change  Outcome Evaluation: Pt resting in bed with wife present - reluctant to amb with PT but agreed with encouragement. Pt req min A for bed mobility to sit EOB. Pt stood from EOB with min A. Pt amb 120' with CGA - slow pace, staggered gt, wide SHAYLA, flexed posture, fair balance. Decreased endurance limits further amb distance. Pt noticable SOB after amb - O2 94%. He performed AP and seated hip flex. Pt returned supine with min A and was able to reposition self in bed with vc. Cont PT to address functional deficits and progress mobility as tolerated. Plan to d/c home although wife is worried.

## 2023-01-12 NOTE — PLAN OF CARE
Problem: Adult Inpatient Plan of Care  Goal: Plan of Care Review  Recent Flowsheet Documentation  Taken 1/12/2023 1423 by Bhakti Oliveira PT  Progress: improving  Plan of Care Reviewed With: patient  Outcome Evaluation: Pt resting in chair in NAD, pleasant and cooperative. Pt req CGA of 2 to stand from recliner.  Pt amb 300' with CGA of 2 - slow pace, flexed posture, endurance improving, good balance. Pt fatigued after amb but did not req rest break. Per Nurse request, pt returned to bed for lines to be removed. Pt req min A to return supine and reposition in bed. Pt encouraged to amb [facility] staff throughout the day. Pt dem sig improvement in amb distance today. Cont PT to progress mobility as tolerated and prepare for d/c home with wife.   Goal Outcome Evaluation:  Plan of Care Reviewed With: patient        Progress: improving  Outcome Evaluation: Pt resting in chair in NAD, pleasant and cooperative. Pt req CGA of 2 to stand from recliner.  Pt amb 300' with CGA of 2 - slow pace, flexed posture, endurance improving, good balance. Pt fatigued after amb but did not req rest break. Per Nurse request, pt returned to bed for lines to be removed. Pt req min A to return supine and reposition in bed. Pt encouraged to amb [facility] staff throughout the day. Pt dem sig improvement in amb distance today. Cont PT to progress mobility as tolerated and prepare for d/c home with wife.

## 2023-01-12 NOTE — DISCHARGE PLACEMENT REQUEST
"Joshua Wolf (67 y.o. Male)     Date of Birth   1955    Social Security Number       Address   [address] [place] [address]    Home Phone   [phone]    MRN   [id number]       Ep[employer]   Islam    Marital Status                               Admission Date   1/5/23    Admission Type   Emergency    Admitting Provider   Tyson Chun MD    Attending Provider   Joshua Mc MD    Department, Room/Bed   [facility] CARDIOVASC UNIT, 2224/1       Discharge Date       Discharge Disposition       Discharge Destination                               Attending Provider: Joshua Mc MD    Allergies: No Known Allergies    Isolation: None   Infection: None   Code Status: CPR    Ht: 175.3 cm (69\")   Wt: 117 kg (257 lb 9.6 oz)    Admission Cmt: None   Principal Problem: Chest pain [R07.9]                 Active Insurance as of 1/5/2023     Primary Coverage     Payor Plan Insurance Group Employer/Plan Group    MEDICARE MEDICARE A ONLY      Payor Plan Address Payor Plan Phone Number Payor Plan Fax Number Effective Dates    [address] [phone]  4/1/2022 - None Entered    [place] [address]       Subscriber Name Subscriber Birth Date Member ID       JOSHUA WOLF 1955 [id number]                 Emergency Contacts      (Rel.) Home Phone Work Phone Mobile Phone    Chuyita Wolf (Spouse) [phone] -- --              "

## 2023-01-12 NOTE — NURSING NOTE
"Diabetes Education  Assessment/Teaching    Patient Name:  Joshua Wolf  YOB: 1955  MRN: [id number]  Admit Date:  1/5/2023      Assessment Date:  1/12/2023  Flowsheet Row Most Recent Value   General Information     Referral From: Other -pharmacist. Meet with 66 y/o at bedside to attempt DM ed needs-assessment and start insulin teaching.    Height 175.3 cm (69\")   Height Method Stated   Weight 117 kg (257 lb 9.6 oz)   Weight Method Standing scale   Diabetes History    What type of diabetes do you have? Type 2   Length of Diabetes Diagnosis 6 - 10 years   Do you test your blood sugar at home? no   Have you had high blood sugar? (>140mg/dl) yes -a1c >12.   Do you have any diabetes complications? -- cardiac dz.   Education Preferences    Barriers to Learning -- acuity. pt appears to be in pain.   Assessment Topics    Medication Needs education   Monitoring - Assessment Needs education   DM Goals    Contact Plan Follow-up medical care          Flowsheet Row Most Recent Value   DM Education Needs    Meter Needs meter   Medication Insulin   Discharge Plan Home, Follow-up with MD   Teaching Method Discussion   Patient Response -- did not appear a good time for learning/ed.      Other Comments:   Electronically signed by:  Melanie Vera, RN, BSN, [facility]  01/12/23 16:37 EST  "

## 2023-01-12 NOTE — PROGRESS NOTES
Joshua Wolf   67 y.o.  male    LOS: 5 days   Patient Care Team:  Eric Eubanks MD as PCP - General (Family Medicine)  Jamir Hughes MD as Consulting Physician (Nephrology)      Subjective   Doing well  Interval History:     Patient Complaints:     Review of Systems:       Medication Review:   Current Facility-Administered Medications:   •  acetaminophen (TYLENOL) tablet 650 mg, 650 mg, Oral, Q4H PRN **OR** acetaminophen (TYLENOL) 160 MG/5ML solution 650 mg, 650 mg, Oral, Q4H PRN **OR** acetaminophen (TYLENOL) suppository 650 mg, 650 mg, Rectal, Q4H PRN, Dima Velasquez MD  •  ALPRAZolam (XANAX) tablet 0.25 mg, 0.25 mg, Oral, Q8H PRN, Dima Velasquez MD  •  [COMPLETED] amiodarone in dextrose 5% (NEXTERONE) loading dose 150mg/100mL, 150 mg, Intravenous, Once, 150 mg at 01/12/23 1021 **FOLLOWED BY** amiodarone 360 mg in 200 mL D5W infusion, 1 mg/min, Intravenous, Continuous, Last Rate: 33.3 mL/hr at 01/12/23 1043, 1 mg/min at 01/12/23 1043 **FOLLOWED BY** amiodarone 360 mg in 200 mL D5W infusion, 0.5 mg/min, Intravenous, Continuous, Courtney, BREANNE Goldberg  •  aspirin EC tablet 81 mg, 81 mg, Oral, Daily, Dima Velasquez MD, 81 mg at 01/12/23 0808  •  atorvastatin (LIPITOR) tablet 80 mg, 80 mg, Oral, Nightly, Chuyita Tate APRN, 80 mg at 01/11/23 2101  •  bisacodyl (DULCOLAX) EC tablet 10 mg, 10 mg, Oral, Daily PRN, Dima Velasquez MD  •  bisacodyl (DULCOLAX) suppository 10 mg, 10 mg, Rectal, Daily PRN, Dima Velasquez MD  •  chlorhexidine (PERIDEX) 0.12 % solution 15 mL, 15 mL, Mouth/Throat, Q12H, Dima Velasquez MD, 15 mL at 01/12/23 0809  •  cyclobenzaprine (FLEXERIL) tablet 10 mg, 10 mg, Oral, Q8H PRN, Dima Velasquez MD, 10 mg at 01/11/23 1340  •  dextrose (D50W) (25 g/50 mL) IV injection 25 g, 25 g, Intravenous, Q15 Min PRN, Dima Velasquez MD  •  dextrose (GLUTOSE) oral gel 15 g, 15 g, Oral, Q15 Min PRN, Dima Velasquze MD  •   Enoxaparin Sodium (LOVENOX) syringe 40 mg, 40 mg, Subcutaneous, Q24H, Savanah Valdez APRN, 40 mg at 01/11/23 1855  •  glucagon (human recombinant) (GLUCAGEN DIAGNOSTIC) injection 1 mg, 1 mg, Intramuscular, Q15 Min PRN, Dima Velasquez MD  •  guaiFENesin (MUCINEX) 12 hr tablet 1,200 mg, 1,200 mg, Oral, Q12H, Dima Velasquez MD, 1,200 mg at 01/12/23 0808  •  HYDROcodone-acetaminophen (NORCO) 5-325 MG per tablet 2 tablet, 2 tablet, Oral, Q4H PRN, Dima Velasquez MD, 2 tablet at 01/12/23 0929  •  insulin glargine (LANTUS, SEMGLEE) injection 20 Units, 20 Units, Subcutaneous, Nightly, Savanah Valdez APRN  •  insulin lispro (ADMELOG) injection 0-9 Units, 0-9 Units, Subcutaneous, 4x Daily With Meals & Nightly, Dima Velasquez MD, 2 Units at 01/12/23 0809  •  insulin lispro (ADMELOG) injection 5 Units, 5 Units, Subcutaneous, TID With Meals, Dima Velasquez MD, 5 Units at 01/12/23 0809  •  ipratropium-albuterol (DUO-NEB) nebulizer solution 3 mL, 3 mL, Nebulization, Q4H PRN, Savanah Valdez APRN  •  magnesium hydroxide (MILK OF MAGNESIA) suspension 10 mL, 10 mL, Oral, Daily PRN, Dima Velasquez MD  •  metoprolol tartrate (LOPRESSOR) tablet 25 mg, 25 mg, Oral, Q12H, Radha Madrid APRN, 25 mg at 01/12/23 0808  •  mupirocin (BACTROBAN) 2 % nasal ointment, , Each Nare, BID, Dima Velasquez MD, 1 application at 01/12/23 0810  •  [DISCONTINUED] morphine injection 1 mg, 1 mg, Intravenous, Q4H PRN **AND** naloxone (NARCAN) injection 0.4 mg, 0.4 mg, Intravenous, Q5 Min PRN, Dima Velasquez MD  •  ondansetron (ZOFRAN) injection 4 mg, 4 mg, Intravenous, Q6H PRN, Dima Velasquez MD  •  oxyCODONE (ROXICODONE) immediate release tablet 10 mg, 10 mg, Oral, Q4H PRN, Dima Velasquez MD, 10 mg at 01/11/23 1505  •  [COMPLETED] pantoprazole (PROTONIX) injection 40 mg, 40 mg, Intravenous, Once, 40 mg at 01/10/23 1242 **FOLLOWED BY** pantoprazole (PROTONIX) EC  tablet 40 mg, 40 mg, Oral, QAM, Dima Velasquez MD, 40 mg at 01/12/23 0618  •  polyethylene glycol (MIRALAX) packet 17 g, 17 g, Oral, Daily PRN, Dima Velasquez MD  •  potassium chloride (K-DUR,KLOR-CON) ER tablet 40 mEq, 40 mEq, Oral, PRN **OR** potassium chloride (KLOR-CON) packet 40 mEq, 40 mEq, Oral, PRN, Dima Velasquez MD  •  potassium chloride 10 mEq in 100 mL IVPB, 10 mEq, Intravenous, Q1H PRN **OR** potassium chloride 10 mEq in 100 mL IVPB, 10 mEq, Intravenous, Q1H PRN, Dima Velasquez MD  •  sennosides-docusate (PERICOLACE) 8.6-50 MG per tablet 2 tablet, 2 tablet, Oral, Nightly, Dima Velasquez MD, 2 tablet at 01/11/23 2101      Objective   Vital Sign Min/Max for last 24 hours  Temp  Min: 97.9 °F (36.6 °C)  Max: 100.2 °F (37.9 °C)   BP  Min: 119/83  Max: 146/89    Pulse  Min: 81  Max: 114     Wt Readings from Last 3 Encounters:   01/12/23 117 kg (257 lb 9.6 oz)   04/12/22 118 kg (260 lb 12.8 oz)   10/14/21 112 kg (246 lb)        Intake/Output Summary (Last 24 hours) at 1/12/2023 1045  Last data filed at 1/12/2023 0600  Gross per 24 hour   Intake 600 ml   Output 1225 ml   Net -625 ml     Physical Exam:      General Appearance:    Well developed and well nourished in no acute distress   Head:    Normocephalic, atraumatic   Eyes:            Conjunctivae normal, anicteric, no xanthelasma   Neck:   supple, trachea midline, no thyromegaly, no carotid bruit, no JVD, no elevated CVP   Lungs:     Clear to auscultation,respirations regular, even and                  unlabored    Heart:    Regular rhythm and normal rate, normal S1 and S2,            No murmur, no gallop, no rub, no click   Chest Wall:    No abnormalities observed   Abdomen:     Normal bowel sounds, no masses, no organomegaly, soft        nontender, nondistended, no guarding, no rebound                tenderness   Rectal:     Deferred   Extremities:   No edema. Moves all extremities well, no cyanosis, no  erythema   Pulses:   Pulses palpable and equal bilaterally   Skin:   No bleeding, bruising or rash   Neurologic:   awake alert and oriented x3, speech clear and approp, no facial drooping     :    Monitor:      Results Review:         Sodium Sodium   Date Value Ref Range Status   01/12/2023 135 (L) 136 - 145 mmol/L Final   01/11/2023 138 136 - 145 mmol/L Final   01/10/2023 139 136 - 145 mmol/L Final   01/10/2023 136 136 - 145 mmol/L Final   01/10/2023 137 136 - 145 mmol/L Final      Potassium Potassium   Date Value Ref Range Status   01/12/2023 4.1 3.5 - 5.2 mmol/L Final   01/11/2023 3.7 3.5 - 5.2 mmol/L Final   01/10/2023 3.9 3.5 - 5.2 mmol/L Final   01/10/2023 4.2 3.5 - 5.2 mmol/L Final     Comment:     Slight hemolysis detected by analyzer. Results may be affected.   01/10/2023 4.1 3.5 - 5.2 mmol/L Final     Comment:     Slight hemolysis detected by analyzer. Results may be affected.      Chloride Chloride   Date Value Ref Range Status   01/12/2023 101 98 - 107 mmol/L Final   01/11/2023 105 98 - 107 mmol/L Final   01/10/2023 107 98 - 107 mmol/L Final   01/10/2023 104 98 - 107 mmol/L Final   01/10/2023 102 98 - 107 mmol/L Final      Bicarbonate No results found for: PLASMABICARB   BUN BUN   Date Value Ref Range Status   01/12/2023 19 8 - 23 mg/dL Final   01/11/2023 22 8 - 23 mg/dL Final   01/10/2023 21 8 - 23 mg/dL Final   01/10/2023 23 8 - 23 mg/dL Final   01/10/2023 25 (H) 8 - 23 mg/dL Final      Creatinine Creatinine   Date Value Ref Range Status   01/12/2023 1.23 0.76 - 1.27 mg/dL Final   01/11/2023 1.20 0.76 - 1.27 mg/dL Final   01/10/2023 1.34 (H) 0.76 - 1.27 mg/dL Final   01/10/2023 1.37 (H) 0.76 - 1.27 mg/dL Final   01/10/2023 1.25 0.76 - 1.27 mg/dL Final      Calcium Calcium   Date Value Ref Range Status   01/12/2023 8.7 8.6 - 10.5 mg/dL Final   01/11/2023 8.6 8.6 - 10.5 mg/dL Final   01/10/2023 8.9 8.6 - 10.5 mg/dL Final   01/10/2023 10.0 8.6 - 10.5 mg/dL Final   01/10/2023 9.2 8.6 - 10.5 mg/dL Final       Magnesium Magnesium   Date Value Ref Range Status   01/11/2023 2.3 1.6 - 2.4 mg/dL Final   01/10/2023 2.7 (H) 1.6 - 2.4 mg/dL Final   01/10/2023 2.8 (H) 1.6 - 2.4 mg/dL Final        Results from last 7 days   Lab Units 01/12/23  0417   WBC 10*3/mm3 7.84   HEMOGLOBIN g/dL 12.7*   HEMATOCRIT % 35.8*   PLATELETS 10*3/mm3 79*     Lab Results   Lab Value Date/Time    TROPONINT 0.136 (C) 01/06/2023 0552    TROPONINT 0.164 (C) 01/06/2023 0024    TROPONINT 0.178 (C) 01/05/2023 1746    TROPONINT 0.143 (C) 01/05/2023 1508    TROPONINT <0.010 01/05/2023 0858    TROPONINT <0.010 10/30/2017 0114    TROPONINT <0.010 08/12/2016 0944    TROPONINT 0.246 (C) 07/19/2016 0404    TROPONINT <0.010 07/18/2016 0956            Echo EF Estimated  Lab Results   Component Value Date    ECHOEFEST 35 07/18/2016         Assessment/ Plan  Assessment & Plan   Active Hospital Problems    Diagnosis  POA   • **Chest pain [R07.9]  Yes   • Chest pain, unspecified type [R07.9]  Yes   • NSTEMI (non-ST elevated myocardial infarction) (HCC) [I21.4]  Unknown     Added automatically from request for surgery [id number]     • Abnormal findings on diagnostic imaging of heart and coronary circulation [R93.1]  Unknown     Added automatically from request for surgery [id number]               Assessment & Plan            Active Hospital Problems     Diagnosis   POA   • **Chest pain [R07.9]   Yes       Priority: Low   • Chest pain, unspecified type [R07.9]   Yes       Priority: Low   • NSTEMI (non-ST elevated myocardial infarction) (HCC) [I21.4]   Unknown       Priority: Low       Added automatically from request for surgery [id number]      • Abnormal findings on diagnostic imaging of heart and coronary circulation [R93.1]   Unknown       Priority: Low       Added automatically from request for surgery [id number]            A/P: 66 yo male with Known CAD with CP.     1. CP/CAD - unstable angina - in patient with previous PCI of LAD x 2 - repeat LHC showed diffuse disease  distal to previous stents in the LAD, LCx has 80% OM1 and 70% OM2, with occlusion of OM3 distally, RCA with mid diffuse 80% disease. EF normal.   POD 1 CABG x 4 (LIMA to LAD, SVG to ramus, SVG to OM, SVG to RCA), Left atrial appendage occlusion     intraop CLAIR   Ventricles  Right Ventricle:  Hypertrophy not present.  Thrombus not present.  Global function normal.    Left Ventricle:  Thrombus not present.  Global Function mildly impaired.  Ejection Fraction 50%.     Valves  Aortic Valve:  Stenosis not present.  Regurgitation trace.  Leaflets normal.  Leaflet motions normal and prolapsed.    Mitral Valve:  Stenosis not present.  Regurgitation trace.  Leaflets normal.  Leaflet motions normal.    Tricuspid Valve:  Stenosis not present.  Regurgitation absent.  Leaflet motions normal.    Pulmonic Valve:  Regurgitation trace.     2. HTN      3. HLD   - on Statin,     4.  Paroxysmal, Atrial Fibrillation    - elevated VCV6IN6-CPXf score of at least 4 - should be on life long AC -      5. ckd stage 3     6. HFpEF  cxr-IMPRESSION: Increasing vascular congestion and bibasilar atelectasis        Tyson Chun MD  01/12/23  10:45 EST

## 2023-01-12 NOTE — CASE MANAGEMENT/SOCIAL WORK
"Continued Stay Note  [facility]     Patient Name: Joshua Wolf  MRN: [id number]  Today's Date: 1/12/2023    Admit Date: 1/5/2023    Plan: Home w/ family;  F/U to see if Pt has HH coverage with [facility]   Discharge Plan     Row Name 01/12/23 1658       Plan    Plan Home w/ family;  F/U to see if Pt has HH coverage with [facility]    Plan Comments Rea with [facility] gave Pt medication assistance paperwork for Anurag.  CCP spoke with Pt at bedside about his insurance.  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  Pt reports he did sign up for Medicare Part B in December.  Pt reports he received an email in early January from , confirming they received his request for  Part B.  CCP sent an email to [facility]  department, requesting they run his benefits again.  The medicare system is still showing he only has Medicare A.  CCP informed Pt he may have to pay a penalty if he signed up for Part B late.  Pt verbalized understanding and reported, \"I have no problem paying a penalty, I just need more information on what the penalty is.\"  CCP gave Pt the contact information for the social security office.  CCP also gave Pt a print-out off the medicare.gov website of the \"Avoid late enrollment penalties\" and \"Part D late enrollment penalty\".  Pt verbalized understanding that he would have to private pay for his dishcarge medications since he has no prescription drug coverage.  Pt reports he plans to discharge home with assistance of his wife and son.  CCP following...........Candis WOMACK/JOS CM               Discharge Codes    No documentation.               Expected Discharge Date and Time     Expected Discharge Date Expected Discharge Time    Jan 17, 2023             Candis Hayes RN    "

## 2023-01-12 NOTE — NURSING NOTE
Diuretic in Use: none   Response to Diuretics (Output greater than intake):no   Daily Weight (up or down):   O2 Requirements: 2l nc  Functional Status (Activity level, tolerance and respiratory symptoms): 2 person assist  Discharge Plans: tbd

## 2023-01-13 ENCOUNTER — APPOINTMENT (OUTPATIENT)
Dept: GENERAL RADIOLOGY | Facility: HOSPITAL | Age: 68
DRG: 234 | End: 2023-01-13
Payer: MEDICARE

## 2023-01-13 LAB
ANION GAP SERPL CALCULATED.3IONS-SCNC: 13 MMOL/L (ref 5–15)
ANION GAP SERPL CALCULATED.3IONS-SCNC: 9.6 MMOL/L (ref 5–15)
BUN SERPL-MCNC: 25 MG/DL (ref 8–23)
BUN SERPL-MCNC: 28 MG/DL (ref 8–23)
BUN/CREAT SERPL: 17.4 (ref 7–25)
BUN/CREAT SERPL: 19.6 (ref 7–25)
CALCIUM SPEC-SCNC: 9 MG/DL (ref 8.6–10.5)
CALCIUM SPEC-SCNC: 9.1 MG/DL (ref 8.6–10.5)
CHLORIDE SERPL-SCNC: 97 MMOL/L (ref 98–107)
CHLORIDE SERPL-SCNC: 99 MMOL/L (ref 98–107)
CO2 SERPL-SCNC: 22 MMOL/L (ref 22–29)
CO2 SERPL-SCNC: 24.4 MMOL/L (ref 22–29)
CREAT SERPL-MCNC: 1.43 MG/DL (ref 0.76–1.27)
CREAT SERPL-MCNC: 1.44 MG/DL (ref 0.76–1.27)
DEPRECATED RDW RBC AUTO: 37.8 FL (ref 37–54)
EGFRCR SERPLBLD CKD-EPI 2021: 53.9 ML/MIN/1.73
EGFRCR SERPLBLD CKD-EPI 2021: 54.4 ML/MIN/1.73
ERYTHROCYTE [DISTWIDTH] IN BLOOD BY AUTOMATED COUNT: 12.1 % (ref 12.3–15.4)
GLUCOSE BLDC GLUCOMTR-MCNC: 136 MG/DL (ref 70–130)
GLUCOSE BLDC GLUCOMTR-MCNC: 168 MG/DL (ref 70–130)
GLUCOSE BLDC GLUCOMTR-MCNC: 186 MG/DL (ref 70–130)
GLUCOSE BLDC GLUCOMTR-MCNC: 188 MG/DL (ref 70–130)
GLUCOSE SERPL-MCNC: 163 MG/DL (ref 65–99)
GLUCOSE SERPL-MCNC: 164 MG/DL (ref 65–99)
HCT VFR BLD AUTO: 37.5 % (ref 37.5–51)
HGB BLD-MCNC: 13.2 G/DL (ref 13–17.7)
MCH RBC QN AUTO: 30 PG (ref 26.6–33)
MCHC RBC AUTO-ENTMCNC: 35.2 G/DL (ref 31.5–35.7)
MCV RBC AUTO: 85.2 FL (ref 79–97)
PLATELET # BLD AUTO: 116 10*3/MM3 (ref 140–450)
PMV BLD AUTO: 10.9 FL (ref 6–12)
POTASSIUM SERPL-SCNC: 3.7 MMOL/L (ref 3.5–5.2)
POTASSIUM SERPL-SCNC: 3.8 MMOL/L (ref 3.5–5.2)
QT INTERVAL: 377 MS
RBC # BLD AUTO: 4.4 10*6/MM3 (ref 4.14–5.8)
SODIUM SERPL-SCNC: 132 MMOL/L (ref 136–145)
SODIUM SERPL-SCNC: 133 MMOL/L (ref 136–145)
WBC NRBC COR # BLD: 9.16 10*3/MM3 (ref 3.4–10.8)

## 2023-01-13 PROCEDURE — 25010000002 METOCLOPRAMIDE PER 10 MG

## 2023-01-13 PROCEDURE — 93005 ELECTROCARDIOGRAM TRACING: CPT

## 2023-01-13 PROCEDURE — 63710000001 INSULIN LISPRO (HUMAN) PER 5 UNITS

## 2023-01-13 PROCEDURE — 80048 BASIC METABOLIC PNL TOTAL CA: CPT | Performed by: NURSE PRACTITIONER

## 2023-01-13 PROCEDURE — 80048 BASIC METABOLIC PNL TOTAL CA: CPT

## 2023-01-13 PROCEDURE — 85027 COMPLETE CBC AUTOMATED: CPT

## 2023-01-13 PROCEDURE — 93010 ELECTROCARDIOGRAM REPORT: CPT | Performed by: INTERNAL MEDICINE

## 2023-01-13 PROCEDURE — 97110 THERAPEUTIC EXERCISES: CPT

## 2023-01-13 PROCEDURE — 71046 X-RAY EXAM CHEST 2 VIEWS: CPT

## 2023-01-13 PROCEDURE — 25010000002 ENOXAPARIN PER 10 MG

## 2023-01-13 PROCEDURE — 82962 GLUCOSE BLOOD TEST: CPT

## 2023-01-13 RX ORDER — BUMETANIDE 0.25 MG/ML
2 INJECTION INTRAMUSCULAR; INTRAVENOUS ONCE
Status: DISCONTINUED | OUTPATIENT
Start: 2023-01-13 | End: 2023-01-13

## 2023-01-13 RX ORDER — DIPHENHYDRAMINE HYDROCHLORIDE 25 MG/1
CAPSULE, LIQUID FILLED ORAL
Qty: 1 EACH | Refills: 0 | Status: SHIPPED | OUTPATIENT
Start: 2023-01-13

## 2023-01-13 RX ORDER — BUMETANIDE 0.25 MG/ML
2 INJECTION INTRAMUSCULAR; INTRAVENOUS ONCE
Status: COMPLETED | OUTPATIENT
Start: 2023-01-13 | End: 2023-01-13

## 2023-01-13 RX ORDER — BISACODYL 10 MG
10 SUPPOSITORY, RECTAL RECTAL ONCE
Status: COMPLETED | OUTPATIENT
Start: 2023-01-13 | End: 2023-01-13

## 2023-01-13 RX ORDER — AMIODARONE HYDROCHLORIDE 200 MG/1
200 TABLET ORAL EVERY 12 HOURS SCHEDULED
Status: DISCONTINUED | OUTPATIENT
Start: 2023-01-13 | End: 2023-01-16 | Stop reason: HOSPADM

## 2023-01-13 RX ORDER — POTASSIUM CHLORIDE 750 MG/1
20 TABLET, FILM COATED, EXTENDED RELEASE ORAL ONCE
Status: COMPLETED | OUTPATIENT
Start: 2023-01-13 | End: 2023-01-13

## 2023-01-13 RX ORDER — METOCLOPRAMIDE HYDROCHLORIDE 5 MG/ML
10 INJECTION INTRAMUSCULAR; INTRAVENOUS EVERY 6 HOURS
Status: DISCONTINUED | OUTPATIENT
Start: 2023-01-13 | End: 2023-01-16

## 2023-01-13 RX ORDER — POLYETHYLENE GLYCOL 3350 17 G/17G
17 POWDER, FOR SOLUTION ORAL 2 TIMES DAILY
Status: DISCONTINUED | OUTPATIENT
Start: 2023-01-13 | End: 2023-01-16 | Stop reason: HOSPADM

## 2023-01-13 RX ADMIN — METOCLOPRAMIDE HYDROCHLORIDE 10 MG: 5 INJECTION INTRAMUSCULAR; INTRAVENOUS at 21:16

## 2023-01-13 RX ADMIN — INSULIN LISPRO 2 UNITS: 100 INJECTION, SOLUTION INTRAVENOUS; SUBCUTANEOUS at 06:43

## 2023-01-13 RX ADMIN — METOCLOPRAMIDE HYDROCHLORIDE 10 MG: 5 INJECTION INTRAMUSCULAR; INTRAVENOUS at 15:56

## 2023-01-13 RX ADMIN — ENOXAPARIN SODIUM 40 MG: 100 INJECTION SUBCUTANEOUS at 18:53

## 2023-01-13 RX ADMIN — HYDROCODONE BITARTRATE AND ACETAMINOPHEN 2 TABLET: 5; 325 TABLET ORAL at 00:06

## 2023-01-13 RX ADMIN — METOPROLOL TARTRATE 37.5 MG: 25 TABLET, FILM COATED ORAL at 21:15

## 2023-01-13 RX ADMIN — DOCUSATE SODIUM 50MG AND SENNOSIDES 8.6MG 2 TABLET: 8.6; 5 TABLET, FILM COATED ORAL at 21:15

## 2023-01-13 RX ADMIN — INSULIN LISPRO 2 UNITS: 100 INJECTION, SOLUTION INTRAVENOUS; SUBCUTANEOUS at 18:53

## 2023-01-13 RX ADMIN — MUPIROCIN 1 APPLICATION: 20 OINTMENT TOPICAL at 09:04

## 2023-01-13 RX ADMIN — AMIODARONE HYDROCHLORIDE 200 MG: 200 TABLET ORAL at 21:15

## 2023-01-13 RX ADMIN — HYDROCODONE BITARTRATE AND ACETAMINOPHEN 2 TABLET: 5; 325 TABLET ORAL at 21:40

## 2023-01-13 RX ADMIN — CHLORHEXIDINE GLUCONATE 15 ML: 1.2 SOLUTION ORAL at 21:15

## 2023-01-13 RX ADMIN — BUMETANIDE 2 MG: 0.25 INJECTION INTRAMUSCULAR; INTRAVENOUS at 12:34

## 2023-01-13 RX ADMIN — POLYETHYLENE GLYCOL 3350 17 G: 17 POWDER, FOR SOLUTION ORAL at 09:02

## 2023-01-13 RX ADMIN — PANTOPRAZOLE SODIUM 40 MG: 40 TABLET, DELAYED RELEASE ORAL at 06:43

## 2023-01-13 RX ADMIN — METOCLOPRAMIDE HYDROCHLORIDE 10 MG: 5 INJECTION INTRAMUSCULAR; INTRAVENOUS at 09:03

## 2023-01-13 RX ADMIN — CHLORHEXIDINE GLUCONATE 15 ML: 1.2 SOLUTION ORAL at 09:02

## 2023-01-13 RX ADMIN — POTASSIUM CHLORIDE 20 MEQ: 750 TABLET, EXTENDED RELEASE ORAL at 09:03

## 2023-01-13 RX ADMIN — INSULIN LISPRO 8 UNITS: 100 INJECTION, SOLUTION INTRAVENOUS; SUBCUTANEOUS at 18:53

## 2023-01-13 RX ADMIN — INSULIN LISPRO 8 UNITS: 100 INJECTION, SOLUTION INTRAVENOUS; SUBCUTANEOUS at 06:43

## 2023-01-13 RX ADMIN — ACETAMINOPHEN 650 MG: 325 TABLET, FILM COATED ORAL at 09:04

## 2023-01-13 RX ADMIN — ASPIRIN 81 MG: 81 TABLET, COATED ORAL at 09:02

## 2023-01-13 RX ADMIN — INSULIN GLARGINE-YFGN 25 UNITS: 100 INJECTION, SOLUTION SUBCUTANEOUS at 21:16

## 2023-01-13 RX ADMIN — GUAIFENESIN 1200 MG: 600 TABLET, EXTENDED RELEASE ORAL at 09:02

## 2023-01-13 RX ADMIN — INSULIN LISPRO 2 UNITS: 100 INJECTION, SOLUTION INTRAVENOUS; SUBCUTANEOUS at 12:34

## 2023-01-13 RX ADMIN — AMIODARONE HYDROCHLORIDE 200 MG: 200 TABLET ORAL at 09:02

## 2023-01-13 RX ADMIN — BISACODYL 10 MG: 10 SUPPOSITORY RECTAL at 15:56

## 2023-01-13 RX ADMIN — METOPROLOL TARTRATE 37.5 MG: 25 TABLET, FILM COATED ORAL at 09:03

## 2023-01-13 RX ADMIN — ATORVASTATIN CALCIUM 80 MG: 80 TABLET, FILM COATED ORAL at 21:16

## 2023-01-13 RX ADMIN — INSULIN LISPRO 8 UNITS: 100 INJECTION, SOLUTION INTRAVENOUS; SUBCUTANEOUS at 12:34

## 2023-01-13 RX ADMIN — GUAIFENESIN 1200 MG: 600 TABLET, EXTENDED RELEASE ORAL at 21:16

## 2023-01-13 RX ADMIN — ACETAMINOPHEN 650 MG: 325 TABLET, FILM COATED ORAL at 15:56

## 2023-01-13 RX ADMIN — MUPIROCIN 1 APPLICATION: 20 OINTMENT TOPICAL at 21:15

## 2023-01-13 NOTE — PROGRESS NOTES
" LOS: 6 days   Patient Care Team:  Eric Eubanks MD as PCP - General (Family Medicine)  Jamir Hughes MD as Consulting Physician (Nephrology)    Chief Complaint: post op    Subjective:  Symptoms:  Improved.  He reports shortness of breath and cough.    Diet:  Poor intake.  No vomiting.    Activity level: Impaired due to weakness.    Pain:  Pain is well controlled.      Vital Signs  Temp:  [97.9 °F (36.6 °C)-99.5 °F (37.5 °C)] 99.1 °F (37.3 °C)  Heart Rate:  [] 100  Resp:  [15-17] 15  BP: (108-129)/(70-90) 129/81  Body mass index is 38.2 kg/m².    Intake/Output Summary (Last 24 hours) at 1/13/2023 0823  Last data filed at 1/13/2023 0325  Gross per 24 hour   Intake 220 ml   Output 800 ml   Net -580 ml     No intake/output data recorded.          01/11/23  0600 01/12/23  0900 01/13/23  0639   Weight: 113 kg (249 lb 1.9 oz) 117 kg (257 lb 9.6 oz) 117 kg (258 lb 11.2 oz)     Objective:  General Appearance:  Comfortable and in no acute distress.    Vital signs: (most recent): Blood pressure 129/81, pulse 100, temperature 99.1 °F (37.3 °C), temperature source Oral, resp. rate 15, height 175.3 cm (69\"), weight 117 kg (258 lb 11.2 oz), SpO2 92 %.  No fever.    Output: Producing urine.    Lungs:  Normal effort and normal respiratory rate.  There are rales and decreased breath sounds.    Heart: Regular rhythm.    Abdomen: Abdomen is soft.  Hypoactive bowel sounds.     Extremities: There is no dependent edema.    Pulses: Distal pulses are intact.    Neurological: Patient is alert and oriented to person, place and time.    Skin:  Warm and dry.  (Sternal dressing clean, dry, and intact)        Results Review:        WBC WBC   Date Value Ref Range Status   01/13/2023 9.16 3.40 - 10.80 10*3/mm3 Final   01/12/2023 7.84 3.40 - 10.80 10*3/mm3 Final   01/11/2023 8.92 3.40 - 10.80 10*3/mm3 Final   01/10/2023 9.05 3.40 - 10.80 10*3/mm3 Final   01/10/2023 11.35 (H) 3.40 - 10.80 10*3/mm3 Final      HGB Hemoglobin   Date Value " Ref Range Status   01/13/2023 13.2 13.0 - 17.7 g/dL Final   01/12/2023 12.7 (L) 13.0 - 17.7 g/dL Final   01/11/2023 11.9 (L) 13.0 - 17.7 g/dL Final   01/10/2023 12.3 (L) 13.0 - 17.7 g/dL Final   01/10/2023 13.2 13.0 - 17.7 g/dL Final   01/10/2023 10.9 (L) 12.0 - 17.0 g/dL Final   01/10/2023 11.2 (L) 12.0 - 17.0 g/dL Final   01/10/2023 11.2 (L) 12.0 - 17.0 g/dL Final   01/10/2023 12.2 12.0 - 17.0 g/dL Final   01/10/2023 11.6 (L) 12.0 - 17.0 g/dL Final      HCT Hematocrit   Date Value Ref Range Status   01/13/2023 37.5 37.5 - 51.0 % Final   01/12/2023 35.8 (L) 37.5 - 51.0 % Final   01/11/2023 33.0 (L) 37.5 - 51.0 % Final   01/10/2023 35.0 (L) 37.5 - 51.0 % Final   01/10/2023 39.1 37.5 - 51.0 % Final   01/10/2023 32 (L) 38 - 51 % Final   01/10/2023 33 (L) 38 - 51 % Final   01/10/2023 33 (L) 38 - 51 % Final   01/10/2023 36 (L) 38 - 51 % Final   01/10/2023 34 (L) 38 - 51 % Final      Platelets Platelets   Date Value Ref Range Status   01/13/2023 116 (L) 140 - 450 10*3/mm3 Final   01/12/2023 79 (L) 140 - 450 10*3/mm3 Final   01/11/2023 100 (L) 140 - 450 10*3/mm3 Final   01/10/2023 84 (L) 140 - 450 10*3/mm3 Final   01/10/2023 102 (L) 140 - 450 10*3/mm3 Final        PT/INR:    Protime   Date Value Ref Range Status   01/11/2023 15.5 (H) 11.7 - 14.2 Seconds Final   01/10/2023 16.7 (H) 11.7 - 14.2 Seconds Final   /  INR   Date Value Ref Range Status   01/11/2023 1.21 (H) 0.90 - 1.10 Final   01/10/2023 1.34 (H) 0.90 - 1.10 Final       Sodium Sodium   Date Value Ref Range Status   01/13/2023 133 (L) 136 - 145 mmol/L Final   01/12/2023 135 (L) 136 - 145 mmol/L Final   01/11/2023 138 136 - 145 mmol/L Final   01/10/2023 139 136 - 145 mmol/L Final   01/10/2023 136 136 - 145 mmol/L Final      Potassium Potassium   Date Value Ref Range Status   01/13/2023 3.7 3.5 - 5.2 mmol/L Final   01/12/2023 4.1 3.5 - 5.2 mmol/L Final   01/11/2023 3.7 3.5 - 5.2 mmol/L Final   01/10/2023 3.9 3.5 - 5.2 mmol/L Final   01/10/2023 4.2 3.5 - 5.2 mmol/L  Final     Comment:     Slight hemolysis detected by analyzer. Results may be affected.      Chloride Chloride   Date Value Ref Range Status   01/13/2023 99 98 - 107 mmol/L Final   01/12/2023 101 98 - 107 mmol/L Final   01/11/2023 105 98 - 107 mmol/L Final   01/10/2023 107 98 - 107 mmol/L Final   01/10/2023 104 98 - 107 mmol/L Final      Bicarbonate CO2   Date Value Ref Range Status   01/13/2023 24.4 22.0 - 29.0 mmol/L Final   01/12/2023 22.8 22.0 - 29.0 mmol/L Final   01/11/2023 19.9 (L) 22.0 - 29.0 mmol/L Final   01/10/2023 18.1 (L) 22.0 - 29.0 mmol/L Final   01/10/2023 20.3 (L) 22.0 - 29.0 mmol/L Final      BUN BUN   Date Value Ref Range Status   01/13/2023 25 (H) 8 - 23 mg/dL Final   01/12/2023 19 8 - 23 mg/dL Final   01/11/2023 22 8 - 23 mg/dL Final   01/10/2023 21 8 - 23 mg/dL Final   01/10/2023 23 8 - 23 mg/dL Final      Creatinine Creatinine   Date Value Ref Range Status   01/13/2023 1.44 (H) 0.76 - 1.27 mg/dL Final   01/12/2023 1.23 0.76 - 1.27 mg/dL Final   01/11/2023 1.20 0.76 - 1.27 mg/dL Final   01/10/2023 1.34 (H) 0.76 - 1.27 mg/dL Final   01/10/2023 1.37 (H) 0.76 - 1.27 mg/dL Final      Calcium Calcium   Date Value Ref Range Status   01/13/2023 9.1 8.6 - 10.5 mg/dL Final   01/12/2023 8.7 8.6 - 10.5 mg/dL Final   01/11/2023 8.6 8.6 - 10.5 mg/dL Final   01/10/2023 8.9 8.6 - 10.5 mg/dL Final   01/10/2023 10.0 8.6 - 10.5 mg/dL Final      Magnesium Magnesium   Date Value Ref Range Status   01/11/2023 2.3 1.6 - 2.4 mg/dL Final   01/10/2023 2.7 (H) 1.6 - 2.4 mg/dL Final   01/10/2023 2.8 (H) 1.6 - 2.4 mg/dL Final          aspirin, 81 mg, Oral, Daily  atorvastatin, 80 mg, Oral, Nightly  chlorhexidine, 15 mL, Mouth/Throat, Q12H  enoxaparin, 40 mg, Subcutaneous, Q24H  guaiFENesin, 1,200 mg, Oral, Q12H  insulin glargine, 20 Units, Subcutaneous, Nightly  insulin lispro, 0-9 Units, Subcutaneous, 4x Daily With Meals & Nightly  insulin lispro, 8 Units, Subcutaneous, TID With Meals  metoclopramide, 10 mg, Intravenous,  Q6H  metoprolol tartrate, 25 mg, Oral, Q12H  mupirocin, , Each Nare, BID  pantoprazole, 40 mg, Oral, QAM  polyethylene glycol, 17 g, Oral, BID  senna-docusate sodium, 2 tablet, Oral, Nightly      amiodarone, 0.5 mg/min, Last Rate: 0.5 mg/min (01/13/23 0636)        Patient Active Problem List   Diagnosis Code   • Obesity (BMI 30.0-34.9) E66.9   • CHF (congestive heart failure), NYHA class I ([facility]) I50.9   • Type 2 diabetes mellitus without complication ([facility]) E11.9   • Hyperlipidemia E78.5   • Coronary artery disease I25.10   • Anterior myocardial infarction ([facility]) I21.09   • Skin lesion L98.9   • Chest pain R07.9   • Chest pain, unspecified type R07.9   • NSTEMI (non-ST elevated myocardial infarction) ([facility]) I21.4   • Abnormal findings on diagnostic imaging of heart and coronary circulation R93.1       Assessment & Plan   -NSTEMI, Severe multivessel CAD s/p CABGx4 LIMA/LSVG; SUDHIR closure POD#2 Ron  -Poorly controlled diabetes type 2 A1C 12  -Elevated right hemidiaphragm  -Hyperlipidemia--statin  -paroxsymal atrial fibrillation---Xarelto PTA   -CKD III  -Obesity  - post-op anemia--expected acute blood loss  - TCP--consumptive    Looks good this morning, sitting up in the chair  Remains in AF with improved rate of 90s -- change amio to PO, increase beta blocker  On 2LNC--wean as able; mobilize/encourage pulmonary toilet--mucinex/flutter, duonebs. May need overnight oximetry if unable to wean today.  Did not have a great response to lasix yesterday, will try bumex today  Blood glucose remains elevated; increase lantus  CXR with worsening gas dissension-- will give a dose of IV reglan and add scheduled miralax   Creatinine slightly increased today -- recheck BMP this afternoon   D/c IJ  Keep AV wires today  Discussed anticoagulation with Dr. Velasquez -- resume Xarelto at d/c.  Continue routine care    Savanah Valdez, BREANNE  01/13/23  08:23 EST

## 2023-01-13 NOTE — THERAPY TREATMENT NOTE
Acute Care - Physical Therapy Treatment Note  [facility]     Patient Name: Joshua Wolf  : 1955  MRN: [id number]  Today's Date: 2023      Visit Dx:     ICD-10-CM ICD-9-CM   1. NSTEMI (non-ST elevated myocardial infarction) (HCC)  I21.4 410.70   2. Chest pain, unspecified type  R07.9 786.50   3. Abnormal findings on diagnostic imaging of heart and coronary circulation  R93.1 794.39   4. S/P CABG (coronary artery bypass graft)  Z95.1 V45.81     Patient Active Problem List   Diagnosis   • Obesity (BMI 30.0-34.9)   • CHF (congestive heart failure), NYHA class I ([facility])   • Type 2 diabetes mellitus without complication (HCC)   • Hyperlipidemia   • Coronary artery disease   • Anterior myocardial infarction (HCC)   • Skin lesion   • Chest pain   • Chest pain, unspecified type   • NSTEMI (non-ST elevated myocardial infarction) ([facility])   • Abnormal findings on diagnostic imaging of heart and coronary circulation     Past Medical History:   Diagnosis Date   • CHF (congestive heart failure) ([facility])    • Coronary artery disease    • Diabetes mellitus ([facility])    • Hyperlipidemia    • Myocardial infarction ([facility])    • Paroxysmal A-fib ([facility])    • Seizures ([facility])      Past Surgical History:   Procedure Laterality Date   • CARDIAC CATHETERIZATION N/A 2016    Procedure: Left Heart Cath;  Surgeon: Bella Clinton MD;  Location: [facility] INVASIVE LOCATION;  Service:    • CARDIAC CATHETERIZATION N/A 10/30/2017    Procedure: Left Heart Cath;  Surgeon: Zully Gregg MD;  Location: [facility] CATH INVASIVE LOCATION;  Service:    • CARDIAC CATHETERIZATION N/A 10/30/2017    Procedure: Coronary angiography;  Surgeon: Zully Gregg MD;  Location: [facility] CATH INVASIVE LOCATION;  Service:    • CARDIAC CATHETERIZATION N/A 10/30/2017    Procedure: Stent SANIA coronary;  Surgeon: Zully Gregg MD;  Location: [facility] CATH INVASIVE LOCATION;  Service:    • CARDIAC CATHETERIZATION N/A 2023    Procedure: LEFT HEART CATH;   Surgeon: Bernardo Domingeuz MD;  Location: [facility] CATH INVASIVE LOCATION;  Service: Cardiovascular;  Laterality: N/A;   • CARDIAC CATHETERIZATION N/A 1/6/2023    Procedure: CORONARY ANGIOGRAPHY;  Surgeon: Bernardo Dominguez MD;  Location: [facility] CATH INVASIVE LOCATION;  Service: Cardiovascular;  Laterality: N/A;   • CARDIAC CATHETERIZATION N/A 1/6/2023    Procedure: Resting Full Cycle Ratio;  Surgeon: Bernardo Dominguez MD;  Location: [facility] CATH INVASIVE LOCATION;  Service: Cardiovascular;  Laterality: N/A;   • CARDIAC CATHETERIZATION N/A 1/6/2023    Procedure: Left ventriculography;  Surgeon: Bernardo Dominguez MD;  Location: [facility] CATH INVASIVE LOCATION;  Service: Cardiovascular;  Laterality: N/A;   • CORONARY ARTERY BYPASS GRAFT N/A 1/10/2023    Procedure: INTRAOPERATIVE CLAIR; STERNOTOMY; ATRIAL APPENDAGE CLOSURE; CORONARY ARTERY BYPASS GRAFT TIMES FOUR USING LEFT INTERNAL MAMMARY ARTERY GRAFT UTILIZING ENDOSCOPICALLY HARVESTED LEFT GREATER SAPHENOUS  VEIN AND PRP.;  Surgeon: Dima Velasquez MD;  Location: [facility];  Service: Cardiothoracic;  Laterality: N/A;   • MO RT/LT HEART CATHETERS N/A 10/30/2017    Procedure: Percutaneous Coronary Intervention;  Surgeon: Zully Gregg MD;  Location: [facility] CATH INVASIVE LOCATION;  Service: Cardiovascular     PT Assessment (last 12 hours)     PT Evaluation and Treatment     Row Name 01/13/23 1200          Physical Therapy Time and Intention    Subjective Information no complaints  -     Document Type therapy note (daily note)  -     Mode of Treatment individual therapy;physical therapy  -     Patient Effort good  -     Row Name 01/13/23 1200          General Information    Patient Profile Reviewed yes  -     Existing Precautions/Restrictions cardiac;fall;sternal  -     Row Name 01/13/23 1200          Pain    Pre/Posttreatment Pain Comment pt expressed frustration that he did not receive pain meds on time, did not rate pain  -     Row Name 01/13/23 1200           Cognition    Orientation Status (Cognition) oriented x 3  -     Row Name 01/13/23 1200          Bed Mobility    Comment, (Bed Mobility) NT  -     Row Name 01/13/23 1200          Transfers    Transfers stand-sit transfer;sit-stand transfer  -     Row Name 01/13/23 1200          Sit-Stand Transfer    Sit-Stand [place] (Transfers) standby assist  -     Row Name 01/13/23 1200          Stand-Sit Transfer    Stand-Sit [place] (Transfers) standby assist  -     Row Name 01/13/23 1200          Gait/Stairs (Locomotion)    [place] Level (Gait) standby assist;contact guard  -     Distance in Feet (Gait) 450  -LH     Pattern (Gait) step-through  -     Row Name 01/13/23 1200          Motor Skills    Therapeutic Exercise --  cadiac therex x 10  -     Row Name             Wound 01/10/23 0750 midline sternal Incision    Wound - Properties Group Placement Date: 01/10/23  -OD Placement Time: 0750  -OD Present on Hospital Admission: N  -OD Orientation: midline  -OD Location: sternal  -OD Primary Wound Type: Incision  -OD    Retired Wound - Properties Group Placement Date: 01/10/23  -OD Placement Time: 0750  -OD Present on Hospital Admission: N  -OD Orientation: midline  -OD Location: sternal  -OD Primary Wound Type: Incision  -OD    Retired Wound - Properties Group Date first assessed: 01/10/23  -OD Time first assessed: 0750  -OD Present on Hospital Admission: N  -OD Location: sternal  -OD Primary Wound Type: Incision  -OD    Row Name             Wound 01/10/23 0750 Left anterior thigh Incision    Wound - Properties Group Placement Date: 01/10/23  -OD Placement Time: 0750  -OD Side: Left  -OD Orientation: anterior  -OD Location: thigh  -OD Primary Wound Type: Incision  -OD    Retired Wound - Properties Group Placement Date: 01/10/23  -OD Placement Time: 0750  -OD Side: Left  -OD Orientation: anterior  -OD Location: thigh  -OD Primary Wound Type: Incision  -OD    Retired Wound - Properties Group  Date first assessed: 01/10/23  -OD Time first assessed: 0750  -OD Side: Left  -OD Location: thigh  -OD Primary Wound Type: Incision  -OD    Row Name 01/13/23 1200          Plan of Care Review    Plan of Care Reviewed With patient  -     Progress improving  -     Outcome Evaluation Pt tolerated increased gait distance 450ft SBA/CGA, no standing rest breaks req'ed this date,  air trial 94% immediate post ambulation- RN notified.  -     Row Name 01/13/23 1200          Vital Signs    O2 Delivery Pre Treatment supplemental O2  -     O2 Delivery Intra Treatment room air  -     Post SpO2 (%) 94  -     O2 Delivery Post Treatment room air  -     Row Name 01/13/23 1200          Positioning and Restraints    Pre-Treatment Position sitting in chair/recliner  -     Post Treatment Position chair  -     In Chair sitting;encouraged to call for assist;call light within reach;exit alarm on;notified nsg;with family/caregiver  -           User Key  (r) = Recorded By, (t) = Taken By, (c) = Cosigned By    Initials Name Provider Type     Tatyana Nails, PT Physical Therapist    OD Dominic Donato Jr., RN Registered Nurse                Physical Therapy Education     Title: PT OT SLP Therapies (In Progress)     Topic: Physical Therapy (In Progress)     Point: Mobility training (In Progress)     Learning Progress Summary           Patient Acceptance, E, NR by  at 1/13/2023 1219    Acceptance, E, NR by  at 1/13/2023 1215    Acceptance, E, VU by MURTAZA at 1/12/2023 1437    Acceptance, E, VU,NR by MURTAZA at 1/12/2023 1428    Acceptance, E, NR by AR at 1/11/2023 1131                   Point: Home exercise program (In Progress)     Learning Progress Summary           Patient Acceptance, E, NR by  at 1/13/2023 1219    Acceptance, E, NR by  at 1/13/2023 1215    Acceptance, E, VU,NR by MURTAZA at 1/12/2023 1428    Acceptance, E, NR by AR at 1/11/2023 1131                   Point: Body mechanics (In Progress)     Learning Progress  Summary           Patient Acceptance, E, NR by  at 1/13/2023 1219    Acceptance, E, NR by LH at 1/13/2023 1215    Acceptance, E, VU by DJ at 1/12/2023 1437    Acceptance, E, VU,NR by DJ at 1/12/2023 1428    Acceptance, E, NR by AR at 1/11/2023 1131                   Point: Precautions (In Progress)     Learning Progress Summary           Patient Acceptance, E, NR by  at 1/13/2023 1219    Acceptance, E, NR by LH at 1/13/2023 1215    Acceptance, E, VU by DJ at 1/12/2023 1437    Acceptance, E, VU,NR by DJ at 1/12/2023 1428    Acceptance, E, NR by AR at 1/11/2023 1131                               User Key     Initials Effective Dates Name Provider Type Discipline     06/16/21 -  Tatyana Nails, PT Physical Therapist PT    AR 06/16/21 -  Magalie García, PT Physical Therapist PT    MURTAZA 10/25/19 -  Bhakti Oliveira, PT Physical Therapist PT              PT Recommendation and Plan  Anticipated Discharge Disposition (PT): home with home health  Plan of Care Reviewed With: patient  Progress: improving  Outcome Evaluation: Pt tolerated increased gait distance 450ft SBA/CGA, no standing rest breaks req'ed this date,  air trial 94% immediate post ambulation- RN notified.   Outcome Measures     Row Name 01/13/23 1200             How much help from another person do you currently need...    Turning from your back to your side while in flat bed without using bedrails? 4  -LH      Moving from lying on back to sitting on the side of a flat bed without bedrails? 4  -LH      Moving to and from a bed to a chair (including a wheelchair)? 4  -LH      Standing up from a chair using your arms (e.g., wheelchair, bedside chair)? 3  -LH      Climbing 3-5 steps with a railing? 3  -LH      To walk in hospital room? 3  -      AM-PAC 6 Clicks Score (PT) 21  -         Functional Assessment    Outcome Measure Options AM-PAC 6 Clicks Basic Mobility (PT)  -            User Key  (r) = Recorded By, (t) = Taken By, (c) = Cosigned By     Initials Name Provider Type     Tatyana Nails, PT Physical Therapist                 Time Calculation:    PT Charges     Row Name 01/13/23 1220             Time Calculation    Start Time 1033  -      Stop Time 1046  -      Time Calculation (min) 13 min  -      PT Received On 01/13/23  -      PT - Next Appointment 01/14/23  -            User Key  (r) = Recorded By, (t) = Taken By, (c) = Cosigned By    Initials Name Provider Type     Tatyana Nails, PT Physical Therapist              Therapy Charges for Today     Code Description Service Date Service Provider Modifiers Qty    [id number]  PT THER PROC EA 15 MIN 1/13/2023 Tatyana Nails, PT GP 1          PT G-Codes  Outcome Measure Options: AM-PAC 6 Clicks Basic Mobility (PT)  AM-PAC 6 Clicks Score (PT): 21    Tatyana Nails, PT  1/13/2023

## 2023-01-13 NOTE — PLAN OF CARE
Problem: Adult Inpatient Plan of Care  Goal: Plan of Care Review  Recent Flowsheet Documentation  Taken 1/13/2023 1200 by Tatyana Nails, PT  Progress: improving  Plan of Care Reviewed With: patient  Outcome Evaluation: Pt tolerated increased gait distance 450ft SBA/CGA, no standing rest breaks req'ed this date,  air trial 94% immediate post ambulation- RN notified.

## 2023-01-13 NOTE — PROGRESS NOTES
Joshua Wolf   67 y.o.  male    LOS: 6 days   Patient Care Team:  Eric Eubanks MD as PCP - General (Family Medicine)  Jamir Hughes MD as Consulting Physician (Nephrology)      Subjective     Interval History:     Patient Complaints:     Review of Systems:       Medication Review:   Current Facility-Administered Medications:   •  acetaminophen (TYLENOL) tablet 650 mg, 650 mg, Oral, Q4H PRN, 650 mg at 01/13/23 0904 **OR** acetaminophen (TYLENOL) 160 MG/5ML solution 650 mg, 650 mg, Oral, Q4H PRN **OR** acetaminophen (TYLENOL) suppository 650 mg, 650 mg, Rectal, Q4H PRN, Dima Velasquez MD  •  ALPRAZolam (XANAX) tablet 0.25 mg, 0.25 mg, Oral, Q8H PRN, Dima Velasquez MD  •  amiodarone (PACERONE) tablet 200 mg, 200 mg, Oral, Q12H, Savanah Valdez APRN, 200 mg at 01/13/23 0902  •  aspirin EC tablet 81 mg, 81 mg, Oral, Daily, Dima Velasquez MD, 81 mg at 01/13/23 0902  •  atorvastatin (LIPITOR) tablet 80 mg, 80 mg, Oral, Nightly, Chuyita Tate APRN, 80 mg at 01/12/23 2006  •  bisacodyl (DULCOLAX) EC tablet 10 mg, 10 mg, Oral, Daily PRN, Dima Velasquez MD  •  bisacodyl (DULCOLAX) suppository 10 mg, 10 mg, Rectal, Daily PRN, Dima Velasquez MD  •  bumetanide (BUMEX) injection 2 mg, 2 mg, Intravenous, Once, Savanah Valdez APRN  •  chlorhexidine (PERIDEX) 0.12 % solution 15 mL, 15 mL, Mouth/Throat, Q12H, Dima Velasquez MD, 15 mL at 01/13/23 0902  •  cyclobenzaprine (FLEXERIL) tablet 10 mg, 10 mg, Oral, Q8H PRN, Dima Velasquez MD, 10 mg at 01/11/23 1340  •  dextrose (D50W) (25 g/50 mL) IV injection 25 g, 25 g, Intravenous, Q15 Min PRN, Dima Velasquez MD  •  dextrose (GLUTOSE) oral gel 15 g, 15 g, Oral, Q15 Min PRN, Dima Velasquez MD  •  Enoxaparin Sodium (LOVENOX) syringe 40 mg, 40 mg, Subcutaneous, Q24H, Savanah Valdez APRN, 40 mg at 01/11/23 7857  •  glucagon (human recombinant) (GLUCAGEN DIAGNOSTIC) injection 1 mg, 1 mg,  Intramuscular, Q15 Min PRN, Dima Velasquez MD  •  guaiFENesin (MUCINEX) 12 hr tablet 1,200 mg, 1,200 mg, Oral, Q12H, Dima Velasquez MD, 1,200 mg at 01/13/23 0902  •  HYDROcodone-acetaminophen (NORCO) 5-325 MG per tablet 2 tablet, 2 tablet, Oral, Q4H PRN, Dima Velasquez MD, 2 tablet at 01/13/23 0006  •  insulin glargine (LANTUS, SEMGLEE) injection 25 Units, 25 Units, Subcutaneous, Nightly, Savanah Valdez APRN  •  insulin lispro (ADMELOG) injection 0-9 Units, 0-9 Units, Subcutaneous, 4x Daily With Meals & Nightly, Dima Velasquez MD, 2 Units at 01/13/23 0643  •  insulin lispro (ADMELOG) injection 8 Units, 8 Units, Subcutaneous, TID With Meals, Dima Velasquez MD, 8 Units at 01/13/23 0643  •  ipratropium-albuterol (DUO-NEB) nebulizer solution 3 mL, 3 mL, Nebulization, Q4H PRN, Savanah Valdez APRN  •  magnesium hydroxide (MILK OF MAGNESIA) suspension 10 mL, 10 mL, Oral, Daily PRN, Dima Velasquez MD  •  metoclopramide (REGLAN) injection 10 mg, 10 mg, Intravenous, Q6H, Savanah Valdez APRN, 10 mg at 01/13/23 0903  •  metoprolol tartrate (LOPRESSOR) tablet 37.5 mg, 37.5 mg, Oral, Q12H, Savanah Valdez APRN, 37.5 mg at 01/13/23 0903  •  mupirocin (BACTROBAN) 2 % nasal ointment, , Each Nare, BID, Dima Velasquez MD, 1 application at 01/13/23 0904  •  [DISCONTINUED] morphine injection 1 mg, 1 mg, Intravenous, Q4H PRN **AND** naloxone (NARCAN) injection 0.4 mg, 0.4 mg, Intravenous, Q5 Min PRN, Dima Velasquez MD  •  ondansetron (ZOFRAN) injection 4 mg, 4 mg, Intravenous, Q6H PRN, Dima Velasquez MD  •  oxyCODONE (ROXICODONE) immediate release tablet 10 mg, 10 mg, Oral, Q4H PRN, Dima Velasquez MD, 10 mg at 01/11/23 1505  •  [COMPLETED] pantoprazole (PROTONIX) injection 40 mg, 40 mg, Intravenous, Once, 40 mg at 01/10/23 1242 **FOLLOWED BY** pantoprazole (PROTONIX) EC tablet 40 mg, 40 mg, Oral, QAM, Dima Velasquez MD, 40 mg at 01/13/23  0643  •  polyethylene glycol (MIRALAX) packet 17 g, 17 g, Oral, BID, Savanah Valdez APRN, 17 g at 01/13/23 0902  •  potassium chloride (K-DUR,KLOR-CON) ER tablet 40 mEq, 40 mEq, Oral, PRN **OR** potassium chloride (KLOR-CON) packet 40 mEq, 40 mEq, Oral, PRN, Dima Velasquez MD  •  potassium chloride 10 mEq in 100 mL IVPB, 10 mEq, Intravenous, Q1H PRN **OR** potassium chloride 10 mEq in 100 mL IVPB, 10 mEq, Intravenous, Q1H PRN, Dima Velasquez MD  •  sennosides-docusate (PERICOLACE) 8.6-50 MG per tablet 2 tablet, 2 tablet, Oral, Nightly, Dima Velasquez MD, 2 tablet at 01/12/23 2006      Objective   Vital Sign Min/Max for last 24 hours  Temp  Min: 97.9 °F (36.6 °C)  Max: 99.5 °F (37.5 °C)   BP  Min: 108/76  Max: 129/81    Pulse  Min: 89  Max: 100     Wt Readings from Last 3 Encounters:   01/13/23 117 kg (258 lb 11.2 oz)   04/12/22 118 kg (260 lb 12.8 oz)   10/14/21 112 kg (246 lb)        Intake/Output Summary (Last 24 hours) at 1/13/2023 1011  Last data filed at 1/13/2023 0911  Gross per 24 hour   Intake 220 ml   Output 800 ml   Net -580 ml     Physical Exam:      General Appearance:    Well developed and well nourished in no acute distress   Head:    Normocephalic, atraumatic   Eyes:            Conjunctivae normal, anicteric, no xanthelasma   Neck:   supple, trachea midline, no thyromegaly, no carotid bruit, no JVD, no elevated CVP   Lungs:     Clear to auscultation,respirations regular, even and                  unlabored    Heart:    Regular rhythm and normal rate, normal S1 and S2,            No murmur, no gallop, no rub, no click   Chest Wall:    No abnormalities observed   Abdomen:     Normal bowel sounds, no masses, no organomegaly, soft        nontender, nondistended, no guarding, no rebound                tenderness   Rectal:     Deferred   Extremities:   No edema. Moves all extremities well, no cyanosis, no erythema   Pulses:   Pulses palpable and equal bilaterally   Skin:   No  bleeding, bruising or rash   Neurologic:   awake alert and oriented x3, speech clear and approp, no facial drooping     :    Monitor:      Results Review:         Sodium Sodium   Date Value Ref Range Status   01/13/2023 133 (L) 136 - 145 mmol/L Final   01/12/2023 135 (L) 136 - 145 mmol/L Final   01/11/2023 138 136 - 145 mmol/L Final   01/10/2023 139 136 - 145 mmol/L Final   01/10/2023 136 136 - 145 mmol/L Final      Potassium Potassium   Date Value Ref Range Status   01/13/2023 3.7 3.5 - 5.2 mmol/L Final   01/12/2023 4.1 3.5 - 5.2 mmol/L Final   01/11/2023 3.7 3.5 - 5.2 mmol/L Final   01/10/2023 3.9 3.5 - 5.2 mmol/L Final   01/10/2023 4.2 3.5 - 5.2 mmol/L Final     Comment:     Slight hemolysis detected by analyzer. Results may be affected.      Chloride Chloride   Date Value Ref Range Status   01/13/2023 99 98 - 107 mmol/L Final   01/12/2023 101 98 - 107 mmol/L Final   01/11/2023 105 98 - 107 mmol/L Final   01/10/2023 107 98 - 107 mmol/L Final   01/10/2023 104 98 - 107 mmol/L Final      Bicarbonate No results found for: PLASMABICARB   BUN BUN   Date Value Ref Range Status   01/13/2023 25 (H) 8 - 23 mg/dL Final   01/12/2023 19 8 - 23 mg/dL Final   01/11/2023 22 8 - 23 mg/dL Final   01/10/2023 21 8 - 23 mg/dL Final   01/10/2023 23 8 - 23 mg/dL Final      Creatinine Creatinine   Date Value Ref Range Status   01/13/2023 1.44 (H) 0.76 - 1.27 mg/dL Final   01/12/2023 1.23 0.76 - 1.27 mg/dL Final   01/11/2023 1.20 0.76 - 1.27 mg/dL Final   01/10/2023 1.34 (H) 0.76 - 1.27 mg/dL Final   01/10/2023 1.37 (H) 0.76 - 1.27 mg/dL Final      Calcium Calcium   Date Value Ref Range Status   01/13/2023 9.1 8.6 - 10.5 mg/dL Final   01/12/2023 8.7 8.6 - 10.5 mg/dL Final   01/11/2023 8.6 8.6 - 10.5 mg/dL Final   01/10/2023 8.9 8.6 - 10.5 mg/dL Final   01/10/2023 10.0 8.6 - 10.5 mg/dL Final      Magnesium Magnesium   Date Value Ref Range Status   01/11/2023 2.3 1.6 - 2.4 mg/dL Final   01/10/2023 2.7 (H) 1.6 - 2.4 mg/dL Final    01/10/2023 2.8 (H) 1.6 - 2.4 mg/dL Final        Results from last 7 days   Lab Units 01/13/23  0351   WBC 10*3/mm3 9.16   HEMOGLOBIN g/dL 13.2   HEMATOCRIT % 37.5   PLATELETS 10*3/mm3 116*     Lab Results   Lab Value Date/Time    TROPONINT 0.136 (C) 01/06/2023 0552    TROPONINT 0.164 (C) 01/06/2023 0024    TROPONINT 0.178 (C) 01/05/2023 1746    TROPONINT 0.143 (C) 01/05/2023 1508    TROPONINT <0.010 01/05/2023 0858    TROPONINT <0.010 10/30/2017 0114    TROPONINT <0.010 08/12/2016 0944    TROPONINT 0.246 (C) 07/19/2016 0404    TROPONINT <0.010 07/18/2016 0956            Echo EF Estimated  Lab Results   Component Value Date    ECHOEFEST 35 07/18/2016         Assessment/ Plan  Assessment & Plan   Active Hospital Problems    Diagnosis  POA   • **Chest pain [R07.9]  Yes   • Chest pain, unspecified type [R07.9]  Yes   • NSTEMI (non-ST elevated myocardial infarction) ([facility]) [I21.4]  Unknown     Added automatically from request for surgery [id number]     • Abnormal findings on diagnostic imaging of heart and coronary circulation [R93.1]  Unknown     Added automatically from request for surgery [id number]       surgery [id number]            A/P: 68 yo male with Known CAD with CP.     1. CP/CAD - unstable angina - in patient with previous PCI of LAD x 2 - repeat LHC showed diffuse disease distal to previous stents in the LAD, LCx has 80% OM1 and 70% OM2, with occlusion of OM3 distally, RCA with mid diffuse 80% disease. EF normal.   POD 1 CABG x 4 (LIMA to LAD, SVG to ramus, SVG to OM, SVG to RCA), Left atrial appendage occlusion     intraop CLAIR   Ventricles  Right Ventricle:  Hypertrophy not present.  Thrombus not present.  Global function normal.    Left Ventricle:  Thrombus not present.  Global Function mildly impaired.  Ejection Fraction 50%.     Valves  Aortic Valve:  Stenosis not present.  Regurgitation trace.  Leaflets normal.  Leaflet motions normal and prolapsed.    Mitral Valve:  Stenosis not present.  Regurgitation trace.   Leaflets normal.  Leaflet motions normal.    Tricuspid Valve:  Stenosis not present.  Regurgitation absent.  Leaflet motions normal.    Pulmonic Valve:  Regurgitation trace.     2. HTN      3. HLD   - on Statin,     4.  Paroxysmal, Atrial Fibrillation    - elevated [id number] score of at least 4 - should be on life long AC -      5. ckd stage 3     6. HFpEF  cxr-IMPRESSION: Increasing vascular congestion and bibasilar atelectasis      CT tubes out  On PO ignacio Chun MD  01/13/23  10:11 EST

## 2023-01-13 NOTE — CASE MANAGEMENT/SOCIAL WORK
Continued Stay Note  [facility]     Patient Name: Joshua Wolf  MRN: [id number]  Today's Date: 1/13/2023    Admit Date: 1/5/2023    Plan: Home w/ [facility]    Discharge Plan     Row Name 01/13/23 1028       Plan    Plan Home w/ [facility]     Plan Comments Per Becca/Erin LEAL, Pt is covered for home health services at discharge.  CCP following.........Candis FONTANA/JOS CM               Discharge Codes    No documentation.               Expected Discharge Date and Time     Expected Discharge Date Expected Discharge Time    Jan 17, 2023             Candis Hayes RN

## 2023-01-13 NOTE — PLAN OF CARE
Goal Outcome Evaluation:     Problem: Adult Inpatient Plan of Care  Goal: Plan of Care Review  Outcome: Ongoing, Progressing  Goal: Patient-Specific Goal (Individualized)  Outcome: Ongoing, Progressing  Goal: Absence of Hospital-Acquired Illness or Injury  Outcome: Ongoing, Progressing  Intervention: Identify and Manage Fall Risk  Recent Flowsheet Documentation  Taken 1/12/2023 1831 by Da Salgado RN  Safety Promotion/Fall Prevention: safety round/check completed  Taken 1/12/2023 1615 by Da Salgado RN  Safety Promotion/Fall Prevention: safety round/check completed  Taken 1/12/2023 1215 by Da Salgado RN  Safety Promotion/Fall Prevention: safety round/check completed  Taken 1/12/2023 1048 by Da Salgado RN  Safety Promotion/Fall Prevention: safety round/check completed  Taken 1/12/2023 0812 by Da Salgado RN  Safety Promotion/Fall Prevention:   activity supervised   assistive device/personal items within reach   clutter free environment maintained   fall prevention program maintained   lighting adjusted   nonskid shoes/slippers when out of bed   room organization consistent   safety round/check completed  Intervention: Prevent Skin Injury  Recent Flowsheet Documentation  Taken 1/12/2023 0812 by Da Salgado RN  Body Position: (chair) --  Intervention: Prevent and Manage VTE (Venous Thromboembolism) Risk  Recent Flowsheet Documentation  Taken 1/12/2023 0812 by Da Salgado RN  Activity Management: activity adjusted per tolerance  Goal: Optimal Comfort and Wellbeing  Outcome: Ongoing, Progressing  Goal: Readiness for Transition of Care  Outcome: Ongoing, Progressing     Problem: Chest Pain  Goal: Resolution of Chest Pain Symptoms  Outcome: Ongoing, Progressing     Problem: Adjustment to Illness (Heart Failure)  Goal: Optimal Coping  Outcome: Ongoing, Progressing     Problem: Cardiac Output Decreased (Heart Failure)  Goal: Optimal Cardiac Output  Outcome: Ongoing, Progressing      Problem: Dysrhythmia (Heart Failure)  Goal: Stable Heart Rate and Rhythm  Outcome: Ongoing, Progressing     Problem: Fluid Imbalance (Heart Failure)  Goal: Fluid Balance  Outcome: Ongoing, Progressing     Problem: Functional Ability Impaired (Heart Failure)  Goal: Optimal Functional Ability  Outcome: Ongoing, Progressing  Intervention: Optimize Functional Ability  Recent Flowsheet Documentation  Taken 1/12/2023 0812 by Da Salgado RN  Activity Management: activity adjusted per tolerance     Problem: Oral Intake Inadequate (Heart Failure)  Goal: Optimal Nutrition Intake  Outcome: Ongoing, Progressing     Problem: Respiratory Compromise (Heart Failure)  Goal: Effective Oxygenation and Ventilation  Outcome: Ongoing, Progressing     Problem: Sleep Disordered Breathing (Heart Failure)  Goal: Effective Breathing Pattern During Sleep  Outcome: Ongoing, Progressing     Problem: Activity Intolerance (Cardiovascular Surgery)  Goal: Improved Activity Tolerance  Outcome: Ongoing, Progressing     Problem: Adjustment to Surgery (Cardiovascular Surgery)  Goal: Optimal Coping with Heart Surgery  Outcome: Ongoing, Progressing     Problem: Bleeding (Cardiovascular Surgery)  Goal: Bleeding (Cardiovascular Surgery)  Outcome: Ongoing, Progressing  Intervention: Monitor and Manage Bleeding  Recent Flowsheet Documentation  Taken 1/12/2023 0812 by Da Salgado RN  Bleeding Management: dressing monitored     Problem: Bowel Motility Impaired (Cardiovascular Surgery)  Goal: Effective Bowel Elimination (Cardiovascular Surgery)  Outcome: Ongoing, Progressing     Problem: Cardiac Function Impaired (Cardiovascular Surgery)  Goal: Effective Cardiac Function  Outcome: Ongoing, Progressing     Problem: Cerebral Tissue Perfusion (Cardiovascular Surgery)  Goal: Optimal Cerebral Tissue Perfusion (Cardiovascular Surgery)  Outcome: Ongoing, Progressing     Problem: Fluid and Electrolyte Imbalance (Cardiovascular Surgery)  Goal: Fluid and  Electrolyte Balance (Cardiovascular Surgery)  Outcome: Ongoing, Progressing     Problem: Glycemic Control Impaired (Cardiovascular Surgery)  Goal: Blood Glucose Level Within Targeted Range (Cardiovascular Surgery)  Outcome: Ongoing, Progressing     Problem: Infection (Cardiovascular Surgery)  Goal: Absence of Infection Signs and Symptoms  Outcome: Ongoing, Progressing     Problem: Ongoing Anesthesia Effects (Cardiovascular Surgery)  Goal: Anesthesia/Sedation Recovery  Outcome: Ongoing, Progressing  Intervention: Optimize Anesthesia Recovery  Recent Flowsheet Documentation  Taken 1/12/2023 1831 by Da Salgado RN  Safety Promotion/Fall Prevention: safety round/check completed  Taken 1/12/2023 1615 by Da Salgado RN  Safety Promotion/Fall Prevention: safety round/check completed  Taken 1/12/2023 1215 by Da Salgado RN  Safety Promotion/Fall Prevention: safety round/check completed  Taken 1/12/2023 1048 by Da Salgado RN  Safety Promotion/Fall Prevention: safety round/check completed  Taken 1/12/2023 0812 by Da Salgado RN  Safety Promotion/Fall Prevention:   activity supervised   assistive device/personal items within reach   clutter free environment maintained   fall prevention program maintained   lighting adjusted   nonskid shoes/slippers when out of bed   room organization consistent   safety round/check completed     Problem: Pain (Cardiovascular Surgery)  Goal: Acceptable Pain Control  Outcome: Ongoing, Progressing     Problem: Postoperative Nausea and Vomiting (Cardiovascular Surgery)  Goal: Nausea and Vomiting Relief (Cardiovascular Surgery)  Outcome: Ongoing, Progressing     Problem: Postoperative Urinary Retention (Cardiovascular Surgery)  Goal: Effective Urinary Elimination (Cardiovascular Surgery)  Outcome: Ongoing, Progressing     Problem: Respiratory Compromise (Cardiovascular Surgery)  Goal: Effective Oxygenation and Ventilation (Cardiovascular Surgery)  Outcome: Ongoing,  Progressing  Intervention: Optimize Oxygenation and Ventilation  Recent Flowsheet Documentation  Taken 1/12/2023 0812 by Da Salgado, RN  Chest Tube Safety: all connections secured     Problem: Skin Injury Risk Increased  Goal: Skin Health and Integrity  Outcome: Ongoing, Progressing     Problem: Fall Injury Risk  Goal: Absence of Fall and Fall-Related Injury  Outcome: Ongoing, Progressing  Intervention: Identify and Manage Contributors  Recent Flowsheet Documentation  Taken 1/12/2023 0812 by Da Salgado RN  Medication Review/Management: medications reviewed  Intervention: Promote Injury-Free Environment  Recent Flowsheet Documentation  Taken 1/12/2023 1831 by Da Salgado RN  Safety Promotion/Fall Prevention: safety round/check completed  Taken 1/12/2023 1615 by Da Salgado RN  Safety Promotion/Fall Prevention: safety round/check completed  Taken 1/12/2023 1215 by Da Salgado RN  Safety Promotion/Fall Prevention: safety round/check completed  Taken 1/12/2023 1048 by Da Salgado, RN  Safety Promotion/Fall Prevention: safety round/check completed  Taken 1/12/2023 0812 by Da Salgado RN  Safety Promotion/Fall Prevention:   activity supervised   assistive device/personal items within reach   clutter free environment maintained   fall prevention program maintained   lighting adjusted   nonskid shoes/slippers when out of bed   room organization consistent   safety round/check completed

## 2023-01-13 NOTE — CONSULTS
"Diabetes Education  Assessment/Teaching    Patient Name:  Joshua Wolf  YOB: 1955  MRN: [id number]  Admit Date:  1/5/2023      Assessment Date:  1/13/2023  Flowsheet Row Most Recent Value   General Information     Referral From: A1c, Database  [A1C 12.4%]   Height 175.3 cm (69\")   Height Method Stated   Weight 117 kg (258 lb 11.2 oz)   Weight Method Standing scale   Do you have high blood pressure? yes   How would you rate your current health? fair   Patient expressed need what should I eat?   Pregnancy Assessment    Diabetes History    What type of diabetes do you have? Type 2   Length of Diabetes Diagnosis 6 - 10 years   Current DM knowledge poor   Have you had diabetes education/teaching in the past? no  [not really]   Do you test your blood sugar at home? no   Have you had high blood sugar? (>140mg/dl) yes  [-a1c >12.]   How would you rate your diabetes control? fair   Do you have any diabetes complications? heart disease, circulation problems   Education Preferences    What areas of diabetes would you like to learn about? avoiding high blood sugar, medications for diabetes, testing my blood sugar at home, diet information, avoiding low blood sugar   Barriers to Learning financial stress/problems   Nutrition Information    Assessment Topics    Healthy Eating - Assessment Needs education   Being Active - Assessment Needs education   Taking Medication - Assessment Needs education   Problem Solving - Assessment Needs education   Reducing Risk - Assessment Needs education   Healthy Coping - Assessment Needs education   Monitoring - Assessment Needs education   DM Goals    Taking Medication - Goal 0-7 days from discharge   Monitoring - Goal 0-7 days from discharge   Contact Plan Outpatient DM education referral          Flowsheet Row Most Recent Value   DM Education Needs    Meter Meter provided   Meter Type Accuchek   Frequency of Testing 4 times a day   Medication Insulin, Pen, Administration  " [insulin instruction given,pt states he was on insulin in past]   Problem Solving Hypoglycemia, Hyperglycemia, Signs, Symptoms, Treatment   Reducing Risks A1C testing   Physical Activity None   Physical Activity Frequency Discussed exercise importance   Healthy Coping Appropriate  [pt not feeling well]   Discharge Plan Home, Home Care, Follow-up with MD   Motivation Moderate   Teaching Method Explanation, Discussion, Demonstration, Handouts, Teach back   Patient Response Verbalized understanding, Needs reinforcement, Demonstrates adequately            Other Comments:  Discussed with pt his diabetes management.He states he needs education from stage 0,not been doing much to manage his diabetes.He has no meter but has used one in past,a meter will be provided upon discharge. Pt also states he has used insulin in past, we reviewed insulin administration and he states he will be comfortable going home on insulin.  Reviewed basics of diet as pt will be seen by Rd and will be educated in Cardiac Rehab. Pt has a PCP and encouraged pt to have closer communication with his MD regarding his BG readings. We discussed looking into a CGM in future.     Written material given that covers all that was discussed.        Electronically signed by:  Dina Giron RN  01/13/23 10:55 EST

## 2023-01-14 LAB
ANION GAP SERPL CALCULATED.3IONS-SCNC: 9 MMOL/L (ref 5–15)
BUN SERPL-MCNC: 33 MG/DL (ref 8–23)
BUN/CREAT SERPL: 21.3 (ref 7–25)
CALCIUM SPEC-SCNC: 9 MG/DL (ref 8.6–10.5)
CHLORIDE SERPL-SCNC: 98 MMOL/L (ref 98–107)
CO2 SERPL-SCNC: 26 MMOL/L (ref 22–29)
CREAT SERPL-MCNC: 1.55 MG/DL (ref 0.76–1.27)
DEPRECATED RDW RBC AUTO: 37.5 FL (ref 37–54)
EGFRCR SERPLBLD CKD-EPI 2021: 49.4 ML/MIN/1.73
ERYTHROCYTE [DISTWIDTH] IN BLOOD BY AUTOMATED COUNT: 12.2 % (ref 12.3–15.4)
GLUCOSE BLDC GLUCOMTR-MCNC: 109 MG/DL (ref 70–130)
GLUCOSE BLDC GLUCOMTR-MCNC: 159 MG/DL (ref 70–130)
GLUCOSE BLDC GLUCOMTR-MCNC: 161 MG/DL (ref 70–130)
GLUCOSE BLDC GLUCOMTR-MCNC: 182 MG/DL (ref 70–130)
GLUCOSE SERPL-MCNC: 108 MG/DL (ref 65–99)
HCT VFR BLD AUTO: 37.2 % (ref 37.5–51)
HGB BLD-MCNC: 13.2 G/DL (ref 13–17.7)
MCH RBC QN AUTO: 30.1 PG (ref 26.6–33)
MCHC RBC AUTO-ENTMCNC: 35.5 G/DL (ref 31.5–35.7)
MCV RBC AUTO: 84.7 FL (ref 79–97)
PLATELET # BLD AUTO: 136 10*3/MM3 (ref 140–450)
PMV BLD AUTO: 10.6 FL (ref 6–12)
POTASSIUM SERPL-SCNC: 3.9 MMOL/L (ref 3.5–5.2)
RBC # BLD AUTO: 4.39 10*6/MM3 (ref 4.14–5.8)
SODIUM SERPL-SCNC: 133 MMOL/L (ref 136–145)
WBC NRBC COR # BLD: 8.31 10*3/MM3 (ref 3.4–10.8)

## 2023-01-14 PROCEDURE — 25010000002 METOCLOPRAMIDE PER 10 MG

## 2023-01-14 PROCEDURE — 82962 GLUCOSE BLOOD TEST: CPT

## 2023-01-14 PROCEDURE — 94762 N-INVAS EAR/PLS OXIMTRY CONT: CPT

## 2023-01-14 PROCEDURE — 97110 THERAPEUTIC EXERCISES: CPT

## 2023-01-14 PROCEDURE — 80048 BASIC METABOLIC PNL TOTAL CA: CPT

## 2023-01-14 PROCEDURE — 85027 COMPLETE CBC AUTOMATED: CPT

## 2023-01-14 PROCEDURE — 63710000001 INSULIN LISPRO (HUMAN) PER 5 UNITS

## 2023-01-14 PROCEDURE — 25010000002 ENOXAPARIN PER 10 MG

## 2023-01-14 RX ORDER — INSULIN LISPRO 100 [IU]/ML
10 INJECTION, SOLUTION INTRAVENOUS; SUBCUTANEOUS
Status: DISCONTINUED | OUTPATIENT
Start: 2023-01-14 | End: 2023-01-16 | Stop reason: HOSPADM

## 2023-01-14 RX ORDER — INSULIN LISPRO 100 [IU]/ML
0-9 INJECTION, SOLUTION INTRAVENOUS; SUBCUTANEOUS
Status: DISCONTINUED | OUTPATIENT
Start: 2023-01-14 | End: 2023-01-16 | Stop reason: HOSPADM

## 2023-01-14 RX ORDER — TAMSULOSIN HYDROCHLORIDE 0.4 MG/1
0.4 CAPSULE ORAL DAILY
Status: DISCONTINUED | OUTPATIENT
Start: 2023-01-14 | End: 2023-01-16 | Stop reason: HOSPADM

## 2023-01-14 RX ADMIN — MUPIROCIN: 20 OINTMENT TOPICAL at 20:59

## 2023-01-14 RX ADMIN — ASPIRIN 81 MG: 81 TABLET, COATED ORAL at 08:24

## 2023-01-14 RX ADMIN — GUAIFENESIN 1200 MG: 600 TABLET, EXTENDED RELEASE ORAL at 08:24

## 2023-01-14 RX ADMIN — PANTOPRAZOLE SODIUM 40 MG: 40 TABLET, DELAYED RELEASE ORAL at 06:47

## 2023-01-14 RX ADMIN — METOCLOPRAMIDE HYDROCHLORIDE 10 MG: 5 INJECTION INTRAMUSCULAR; INTRAVENOUS at 21:00

## 2023-01-14 RX ADMIN — AMIODARONE HYDROCHLORIDE 200 MG: 200 TABLET ORAL at 20:59

## 2023-01-14 RX ADMIN — METOPROLOL TARTRATE 37.5 MG: 25 TABLET, FILM COATED ORAL at 20:59

## 2023-01-14 RX ADMIN — INSULIN LISPRO 8 UNITS: 100 INJECTION, SOLUTION INTRAVENOUS; SUBCUTANEOUS at 08:23

## 2023-01-14 RX ADMIN — ENOXAPARIN SODIUM 40 MG: 100 INJECTION SUBCUTANEOUS at 16:17

## 2023-01-14 RX ADMIN — MUPIROCIN 1 APPLICATION: 20 OINTMENT TOPICAL at 08:24

## 2023-01-14 RX ADMIN — METOCLOPRAMIDE HYDROCHLORIDE 10 MG: 5 INJECTION INTRAMUSCULAR; INTRAVENOUS at 16:13

## 2023-01-14 RX ADMIN — ATORVASTATIN CALCIUM 80 MG: 80 TABLET, FILM COATED ORAL at 20:59

## 2023-01-14 RX ADMIN — TAMSULOSIN HYDROCHLORIDE 0.4 MG: 0.4 CAPSULE ORAL at 20:59

## 2023-01-14 RX ADMIN — DOCUSATE SODIUM 50MG AND SENNOSIDES 8.6MG 2 TABLET: 8.6; 5 TABLET, FILM COATED ORAL at 20:59

## 2023-01-14 RX ADMIN — HYDROCODONE BITARTRATE AND ACETAMINOPHEN 2 TABLET: 5; 325 TABLET ORAL at 03:28

## 2023-01-14 RX ADMIN — METOCLOPRAMIDE HYDROCHLORIDE 10 MG: 5 INJECTION INTRAMUSCULAR; INTRAVENOUS at 08:24

## 2023-01-14 RX ADMIN — GUAIFENESIN 1200 MG: 600 TABLET, EXTENDED RELEASE ORAL at 20:59

## 2023-01-14 RX ADMIN — METOCLOPRAMIDE HYDROCHLORIDE 10 MG: 5 INJECTION INTRAMUSCULAR; INTRAVENOUS at 03:30

## 2023-01-14 RX ADMIN — POLYETHYLENE GLYCOL 3350 17 G: 17 POWDER, FOR SOLUTION ORAL at 21:00

## 2023-01-14 RX ADMIN — METOPROLOL TARTRATE 37.5 MG: 25 TABLET, FILM COATED ORAL at 08:24

## 2023-01-14 RX ADMIN — INSULIN LISPRO 2 UNITS: 100 INJECTION, SOLUTION INTRAVENOUS; SUBCUTANEOUS at 16:17

## 2023-01-14 RX ADMIN — AMIODARONE HYDROCHLORIDE 200 MG: 200 TABLET ORAL at 08:24

## 2023-01-14 RX ADMIN — HYDROCODONE BITARTRATE AND ACETAMINOPHEN 2 TABLET: 5; 325 TABLET ORAL at 16:17

## 2023-01-14 RX ADMIN — INSULIN GLARGINE-YFGN 25 UNITS: 100 INJECTION, SOLUTION SUBCUTANEOUS at 21:00

## 2023-01-14 RX ADMIN — CHLORHEXIDINE GLUCONATE 15 ML: 1.2 SOLUTION ORAL at 21:00

## 2023-01-14 RX ADMIN — HYDROCODONE BITARTRATE AND ACETAMINOPHEN 2 TABLET: 5; 325 TABLET ORAL at 20:59

## 2023-01-14 NOTE — PLAN OF CARE
Goal Outcome Evaluation:  Plan of Care Reviewed With: patient        Progress: no change   .Diuretic in Use: bumex  Response to Diuretics (Output greater than intake): greater  Daily Weight (up or down): down  O2 Requirements: RA  Functional Status (Activity level, tolerance and respiratory symptoms): standby  Discharge Plans:  d/c home in a few days.   VSS. Afib on monitor. RA. Standby assist. PW intact and attached to pacer. Pain treated per mar.

## 2023-01-14 NOTE — THERAPY TREATMENT NOTE
Patient Name: Joshua Wolf  : 1955    MRN: [id number]                              Today's Date: 2023       Admit Date: 2023    Visit Dx:     ICD-10-CM ICD-9-CM   1. NSTEMI (non-ST elevated myocardial infarction) (HCC)  I21.4 410.70   2. Chest pain, unspecified type  R07.9 786.50   3. Abnormal findings on diagnostic imaging of heart and coronary circulation  R93.1 794.39   4. S/P CABG (coronary artery bypass graft)  Z95.1 V45.81     Patient Active Problem List   Diagnosis   • Obesity (BMI 30.0-34.9)   • CHF (congestive heart failure), NYHA class I (HCC)   • Type 2 diabetes mellitus without complication (HCC)   • Hyperlipidemia   • Coronary artery disease   • Anterior myocardial infarction (HCC)   • Skin lesion   • Chest pain   • Chest pain, unspecified type   • NSTEMI (non-ST elevated myocardial infarction) ([facility])   • Abnormal findings on diagnostic imaging of heart and coronary circulation     Past Medical History:   Diagnosis Date   • CHF (congestive heart failure) (HCC)    • Coronary artery disease    • Diabetes mellitus (HCC)    • Hyperlipidemia    • Myocardial infarction (HCC)    • Paroxysmal A-fib ([facility])    • Seizures ([facility])      Past Surgical History:   Procedure Laterality Date   • CARDIAC CATHETERIZATION N/A 2016    Procedure: Left Heart Cath;  Surgeon: Bella Clinton MD;  Location: [facility] INVASIVE LOCATION;  Service:    • CARDIAC CATHETERIZATION N/A 10/30/2017    Procedure: Left Heart Cath;  Surgeon: Zully Gregg MD;  Location: [facility] CATH INVASIVE LOCATION;  Service:    • CARDIAC CATHETERIZATION N/A 10/30/2017    Procedure: Coronary angiography;  Surgeon: Zully Gregg MD;  Location: [facility] CATH INVASIVE LOCATION;  Service:    • CARDIAC CATHETERIZATION N/A 10/30/2017    Procedure: Stent SANIA coronary;  Surgeon: Zully Gregg MD;  Location: [facility] CATH INVASIVE LOCATION;  Service:    • CARDIAC CATHETERIZATION N/A 2023    Procedure: LEFT HEART CATH;  Surgeon:  Bernardo Dominguez MD;  Location: [facility] CATH INVASIVE LOCATION;  Service: Cardiovascular;  Laterality: N/A;   • CARDIAC CATHETERIZATION N/A 1/6/2023    Procedure: CORONARY ANGIOGRAPHY;  Surgeon: Bernardo Dominguez MD;  Location: [facility] CATH INVASIVE LOCATION;  Service: Cardiovascular;  Laterality: N/A;   • CARDIAC CATHETERIZATION N/A 1/6/2023    Procedure: Resting Full Cycle Ratio;  Surgeon: Bernardo Dominguez MD;  Location: [facility] CATH INVASIVE LOCATION;  Service: Cardiovascular;  Laterality: N/A;   • CARDIAC CATHETERIZATION N/A 1/6/2023    Procedure: Left ventriculography;  Surgeon: Bernardo Dominguez MD;  Location: [facility] CATH INVASIVE LOCATION;  Service: Cardiovascular;  Laterality: N/A;   • CORONARY ARTERY BYPASS GRAFT N/A 1/10/2023    Procedure: INTRAOPERATIVE CLAIR; STERNOTOMY; ATRIAL APPENDAGE CLOSURE; CORONARY ARTERY BYPASS GRAFT TIMES FOUR USING LEFT INTERNAL MAMMARY ARTERY GRAFT UTILIZING ENDOSCOPICALLY HARVESTED LEFT GREATER SAPHENOUS  VEIN AND PRP.;  Surgeon: Dima Velasquez MD;  Location: [facility];  Service: Cardiothoracic;  Laterality: N/A;   • MA RT/LT HEART CATHETERS N/A 10/30/2017    Procedure: Percutaneous Coronary Intervention;  Surgeon: Zully Gregg MD;  Location: [facility] INVASIVE LOCATION;  Service: Cardiovascular      General Information     Row Name 01/14/23 1118 01/14/23 1059       Physical Therapy Time and Intention    Document Type therapy note (daily note)  - therapy note (daily note)  -    Mode of Treatment physical therapy;individual therapy  - physical therapy;individual therapy  -    Row Name 01/14/23 1118 01/14/23 1059       General Information    Patient Profile Reviewed yes  -KH yes  -KH    Existing Precautions/Restrictions cardiac;sternal;fall  -KH cardiac;sternal;fall  -    Row Name 01/14/23 1118 01/14/23 1059       Cognition    Orientation Status (Cognition) oriented x 4  -KH oriented x 4  -    Row Name 01/14/23 1118 01/14/23 1059       Safety Issues,  Functional Mobility    Impairments Affecting Function (Mobility) balance;endurance/activity tolerance;shortness of breath  - balance;endurance/activity tolerance;shortness of breath;strength  -    Comment, Safety Issues/Impairments (Mobility) fall prevention program maintained  - fall prevention program maintained  -          User Key  (r) = Recorded By, (t) = Taken By, (c) = Cosigned By    Initials Name Provider Type    Xiao Bolton, LILLIAM Physical Therapist               Mobility     Row Name 01/14/23 1119          Bed Mobility    Comment, (Bed Mobility) not tested. Up in chair  -     Row Name 01/14/23 1119          Sit-Stand Transfer    Sit-Stand Marshall (Transfers) standby assist  -     Row Name 01/14/23 1119          Gait/Stairs (Locomotion)    Marshall Level (Gait) standby assist;contact guard  -     Distance in Feet (Gait) 300  -     Deviations/Abnormal Patterns (Gait) base of support, wide;gait speed decreased;stride length decreased  -     Marshall Level (Stairs) stand by assist;contact guard  -     Handrail Location (Stairs) right side (ascending);right side (descending)  -     Ascending Technique (Stairs) step-over-step  -     Descending Technique (Stairs) step-over-step  -           User Key  (r) = Recorded By, (t) = Taken By, (c) = Cosigned By    Initials Name Provider Type    Xiao Bolton PT Physical Therapist               Obj/Interventions     Row Name 01/14/23 1120          Motor Skills    Therapeutic Exercise --  cardiac protocol x 5-10 reps  -           User Key  (r) = Recorded By, (t) = Taken By, (c) = Cosigned By    Initials Name Provider Type    Xiao Bolton PT Physical Therapist               Goals/Plan    No documentation.                Clinical Impression     Row Name 01/14/23 1120          Pain    Pretreatment Pain Rating 0/10 - no pain  -     Posttreatment Pain Rating 0/10 - no pain  -     Row Name 01/14/23 1120           Plan of Care Review    Plan of Care Reviewed With patient  -     Progress improving  -     Outcome Evaluation pt progressing with mobility. Pt ambulated 300 ft. and performed flight of stairs reciprocally with HR and SBA-CGA. Pt performed seated exercises. Pt may benefit from continued skilled PT.  -     Row Name 01/14/23 1120          Vital Signs    Pre SpO2 (%) 94  sitting on RA after removing supplemental O2  -KH     Intra SpO2 (%) 92  O2 then dropped to 78% but poor wave length. pt denied any symptoms  -KH     O2 Delivery Intra Treatment room air  -KH     Post SpO2 (%) 88   poor wave length. Notified Nsg and donned supplemental O2  -KH     O2 Delivery Post Treatment supplemental O2  -KH     Row Name 01/14/23 1120          Positioning and Restraints    Pre-Treatment Position sitting in chair/recliner  -KH     Post Treatment Position bed  -KH     In Bed sitting EOB;call light within reach;encouraged to call for assist;with family/caregiver;notified nsg  -           User Key  (r) = Recorded By, (t) = Taken By, (c) = Cosigned By    Initials Name Provider Type     Xiao Mcduffie, PT Physical Therapist               Outcome Measures     Row Name 01/14/23 1124          How much help from another person do you currently need...    Turning from your back to your side while in flat bed without using bedrails? 4  -KH     Moving from lying on back to sitting on the side of a flat bed without bedrails? 4  -KH     Moving to and from a bed to a chair (including a wheelchair)? 4  -KH     Standing up from a chair using your arms (e.g., wheelchair, bedside chair)? 4  -KH     Climbing 3-5 steps with a railing? 4  -KH     To walk in hospital room? 4  -KH     AM-PAC 6 Clicks Score (PT) 24  -KH     Highest level of mobility 8 --> Walked 250 feet or more  -     Row Name 01/14/23 1124          Functional Assessment    Outcome Measure Options AM-PAC 6 Clicks Basic Mobility (PT)  -           User Key  (r) = Recorded By,  (t) = Taken By, (c) = Cosigned By    Initials Name Provider Type     Xiao Mcduffie, PT Physical Therapist                             Physical Therapy Education     Title: PT OT SLP Therapies (Done)     Topic: Physical Therapy (Done)     Point: Mobility training (Done)     Learning Progress Summary           Patient Acceptance, E, VU,NR by  at 1/14/2023 1125    Acceptance, E, NR by  at 1/13/2023 1219    Acceptance, E, NR by  at 1/13/2023 1215    Acceptance, E, VU by DJ at 1/12/2023 1437    Acceptance, E, VU,NR by DJ at 1/12/2023 1428    Acceptance, E, NR by AR at 1/11/2023 1131                   Point: Home exercise program (Done)     Learning Progress Summary           Patient Acceptance, E, VU,NR by  at 1/14/2023 1125    Acceptance, E, NR by  at 1/13/2023 1219    Acceptance, E, NR by  at 1/13/2023 1215    Acceptance, E, VU,NR by DJ at 1/12/2023 1428    Acceptance, E, NR by AR at 1/11/2023 1131                   Point: Body mechanics (Done)     Learning Progress Summary           Patient Acceptance, E, VU,NR by  at 1/14/2023 1125    Acceptance, E, NR by  at 1/13/2023 1219    Acceptance, E, NR by  at 1/13/2023 1215    Acceptance, E, VU by  at 1/12/2023 1437    Acceptance, E, VU,NR by DJ at 1/12/2023 1428    Acceptance, E, NR by AR at 1/11/2023 1131                   Point: Precautions (Done)     Learning Progress Summary           Patient Acceptance, E, VU,NR by  at 1/14/2023 1125    Acceptance, E, NR by  at 1/13/2023 1219    Acceptance, E, NR by  at 1/13/2023 1215    Acceptance, E, VU by DJ at 1/12/2023 1437    Acceptance, E, VU,NR by DJ at 1/12/2023 1428    Acceptance, E, NR by AR at 1/11/2023 1131                               User Key     Initials Effective Dates Name Provider Type Discipline     06/16/21 -  Tatyana Nails, PT Physical Therapist PT    KH 06/16/21 -  Xiao Mcduffie, PT Physical Therapist PT    AR 06/16/21 -  Magalie García, PT Physical Therapist PT    DJ  10/25/19 -  Bhakti Oliveira, PT Physical Therapist PT              PT Recommendation and Plan     Plan of Care Reviewed With: patient  Progress: improving  Outcome Evaluation: pt progressing with mobility. Pt ambulated 300 ft. and performed flight of stairs reciprocally with HR and SBA-CGA. Pt performed seated exercises. Pt may benefit from continued skilled PT.     Time Calculation:    PT Charges     Row Name 01/14/23 1125             Time Calculation    Start Time 1051  -KH      Stop Time 1110  -KH      Time Calculation (min) 19 min  -KH      PT Received On 01/14/23  -      PT - Next Appointment 01/16/23  -            User Key  (r) = Recorded By, (t) = Taken By, (c) = Cosigned By    Initials Name Provider Type    Xiao Bolton, PT Physical Therapist              Therapy Charges for Today     Code Description Service Date Service Provider Modifiers Qty    [id number] HC PT THER PROC EA 15 MIN 1/14/2023 Xiao Mcduffie, PT GP 1          PT G-Codes  Outcome Measure Options: AM-PAC 6 Clicks Basic Mobility (PT)  AM-PAC 6 Clicks Score (PT): 24  PT Discharge Summary  Anticipated Discharge Disposition (PT): home with home health    Xiao Mcduffie, LILLIAM  1/14/2023

## 2023-01-14 NOTE — PROGRESS NOTES
" LOS: 7 days   Patient Care Team:  Eric Eubanks MD as PCP - General (Family Medicine)  Jamir Hughes MD as Consulting Physician (Nephrology)    Chief Complaint: post op    Subjective:  Symptoms:  Stable.  He reports cough.  No shortness of breath.    Diet:  Adequate intake.  No nausea or vomiting.    Activity level: Returning to normal.    Pain:  Pain is well controlled.      Vital Signs  Temp:  [97.8 °F (36.6 °C)-98.4 °F (36.9 °C)] 98 °F (36.7 °C)  Heart Rate:  [] 83  Resp:  [15-16] 16  BP: (113-120)/(70-94) 117/94  Body mass index is 38.09 kg/m².    Intake/Output Summary (Last 24 hours) at 1/14/2023 0821  Last data filed at 1/14/2023 0700  Gross per 24 hour   Intake 600 ml   Output 350 ml   Net 250 ml     No intake/output data recorded.          01/12/23  0900 01/13/23  0639 01/14/23  0604   Weight: 117 kg (257 lb 9.6 oz) 117 kg (258 lb 11.2 oz) 117 kg (257 lb 14.4 oz)     Objective:  General Appearance:  Comfortable and in no acute distress.    Vital signs: (most recent): Blood pressure 117/94, pulse 83, temperature 98 °F (36.7 °C), temperature source Oral, resp. rate 16, height 175.3 cm (69\"), weight 117 kg (257 lb 14.4 oz), SpO2 92 %.  No fever.    Output: Minimal urine output and producing stool.    Lungs:  Normal effort and normal respiratory rate.  There are decreased breath sounds.  No rales.    Heart: Regular rhythm.    Abdomen: Abdomen is soft.  Bowel sounds are normal.   There is no abdominal tenderness.     Extremities: Normal range of motion.  There is no dependent edema.    Pulses: Distal pulses are intact.    Neurological: Patient is alert and oriented to person, place and time.    Skin:  Warm and dry.  (Sternal dressing clean, dry, and intact)      Results Review:        WBC WBC   Date Value Ref Range Status   01/14/2023 8.31 3.40 - 10.80 10*3/mm3 Final   01/13/2023 9.16 3.40 - 10.80 10*3/mm3 Final   01/12/2023 7.84 3.40 - 10.80 10*3/mm3 Final      HGB Hemoglobin   Date Value Ref " Range Status   01/14/2023 13.2 13.0 - 17.7 g/dL Final   01/13/2023 13.2 13.0 - 17.7 g/dL Final   01/12/2023 12.7 (L) 13.0 - 17.7 g/dL Final      HCT Hematocrit   Date Value Ref Range Status   01/14/2023 37.2 (L) 37.5 - 51.0 % Final   01/13/2023 37.5 37.5 - 51.0 % Final   01/12/2023 35.8 (L) 37.5 - 51.0 % Final      Platelets Platelets   Date Value Ref Range Status   01/14/2023 136 (L) 140 - 450 10*3/mm3 Final   01/13/2023 116 (L) 140 - 450 10*3/mm3 Final   01/12/2023 79 (L) 140 - 450 10*3/mm3 Final        PT/INR:    No results found for: PROTIME/  No results found for: INR    Sodium Sodium   Date Value Ref Range Status   01/14/2023 133 (L) 136 - 145 mmol/L Final   01/13/2023 132 (L) 136 - 145 mmol/L Final   01/13/2023 133 (L) 136 - 145 mmol/L Final   01/12/2023 135 (L) 136 - 145 mmol/L Final      Potassium Potassium   Date Value Ref Range Status   01/14/2023 3.9 3.5 - 5.2 mmol/L Final   01/13/2023 3.8 3.5 - 5.2 mmol/L Final   01/13/2023 3.7 3.5 - 5.2 mmol/L Final   01/12/2023 4.1 3.5 - 5.2 mmol/L Final      Chloride Chloride   Date Value Ref Range Status   01/14/2023 98 98 - 107 mmol/L Final   01/13/2023 97 (L) 98 - 107 mmol/L Final   01/13/2023 99 98 - 107 mmol/L Final   01/12/2023 101 98 - 107 mmol/L Final      Bicarbonate CO2   Date Value Ref Range Status   01/14/2023 26.0 22.0 - 29.0 mmol/L Final   01/13/2023 22.0 22.0 - 29.0 mmol/L Final   01/13/2023 24.4 22.0 - 29.0 mmol/L Final   01/12/2023 22.8 22.0 - 29.0 mmol/L Final      BUN BUN   Date Value Ref Range Status   01/14/2023 33 (H) 8 - 23 mg/dL Final   01/13/2023 28 (H) 8 - 23 mg/dL Final   01/13/2023 25 (H) 8 - 23 mg/dL Final   01/12/2023 19 8 - 23 mg/dL Final      Creatinine Creatinine   Date Value Ref Range Status   01/14/2023 1.55 (H) 0.76 - 1.27 mg/dL Final   01/13/2023 1.43 (H) 0.76 - 1.27 mg/dL Final   01/13/2023 1.44 (H) 0.76 - 1.27 mg/dL Final   01/12/2023 1.23 0.76 - 1.27 mg/dL Final      Calcium Calcium   Date Value Ref Range Status   01/14/2023  9.0 8.6 - 10.5 mg/dL Final   01/13/2023 9.0 8.6 - 10.5 mg/dL Final   01/13/2023 9.1 8.6 - 10.5 mg/dL Final   01/12/2023 8.7 8.6 - 10.5 mg/dL Final      Magnesium No results found for: MG       amiodarone, 200 mg, Oral, Q12H  aspirin, 81 mg, Oral, Daily  atorvastatin, 80 mg, Oral, Nightly  chlorhexidine, 15 mL, Mouth/Throat, Q12H  enoxaparin, 40 mg, Subcutaneous, Q24H  guaiFENesin, 1,200 mg, Oral, Q12H  insulin glargine, 25 Units, Subcutaneous, Nightly  insulin lispro, 0-9 Units, Subcutaneous, 4x Daily With Meals & Nightly  insulin lispro, 8 Units, Subcutaneous, TID With Meals  metoclopramide, 10 mg, Intravenous, Q6H  metoprolol tartrate, 37.5 mg, Oral, Q12H  mupirocin, , Each Nare, BID  pantoprazole, 40 mg, Oral, QAM  polyethylene glycol, 17 g, Oral, BID  senna-docusate sodium, 2 tablet, Oral, Nightly           Patient Active Problem List   Diagnosis Code   • Obesity (BMI 30.0-34.9) E66.9   • CHF (congestive heart failure), NYHA class I ([facility]) I50.9   • Type 2 diabetes mellitus without complication ([facility]) E11.9   • Hyperlipidemia E78.5   • Coronary artery disease I25.10   • Anterior myocardial infarction ([facility]) I21.09   • Skin lesion L98.9   • Chest pain R07.9   • Chest pain, unspecified type R07.9   • NSTEMI (non-ST elevated myocardial infarction) ([facility]) I21.4   • Abnormal findings on diagnostic imaging of heart and coronary circulation R93.1       Assessment & Plan   -NSTEMI, Severe multivessel CAD s/p CABGx4 LIMA/LSVG; SUDHIR closure POD#2 [place]  -Poorly controlled diabetes type 2 A1C 12  -Elevated right hemidiaphragm  -Hyperlipidemia--statin  -paroxsymal atrial fibrillation---Xarelto PTA   -CKD III  -Obesity  - post-op anemia--expected acute blood loss  - TCP--consumptive    Looks good this morning, sitting up in the chair  Remains in AF with improved rate of 90s on amio PO   D/C AV wires today, rate controlled with amio.   O2 weaned yesterday, pt sating 88-92 in room, resume 2L NC   Mobilize, encourage  pulmonary toilet - mucinex/flutter, duonebs   Did not have a great response overnight after switching to bumex, bladder scan this morning.   Creatinine continues to trend upwards, (1.43 - 1.55 today). Consulted nephrology. Add flomax 0.4mg QD.   Diabetes counseling yesterday, BG improving, continue insulin therapy  IV reglan and miralax have improved pt symptoms, reports to BMs yesterday after dosing.   Discussed anticoagulation with Dr. Velasquez -- resume Xarelto at d/c.  Continue routine care    Randy Camara PA-C  01/14/23  08:21 EST

## 2023-01-14 NOTE — PROGRESS NOTES
S: no dyspnea, no feverishness,  No nausea    Medications:  Amiodarone 200 mg bid  Aspirin 81 mg  Atorvastatin 80 mg  Enoxaparin 40 mg  Insulin glargine 25 units pm  Insulin lispro 8 units tid  Metoprolol tartrate 37.5 mg bid  Pantoprazole 40 mg    Exam:  HR 83, R 16, /94  Neck: no jvd  Chest: clear  Cor: no lift, normal s1 and s2, no murmur, no gallop  Ab: soft, no tenderness  Ex: no leg edema     A/P: 68 yo male with Known CAD with CP.     1. CP/CAD - unstable angina - in patient with previous PCI of LAD x 2 - repeat LHC showed diffuse disease distal to previous stents in the LAD, LCx has 80% OM1 and 70% OM2, with occlusion of OM3 distally, RCA with mid diffuse 80% disease. EF normal.   POD 2 CABG x 4 (LIMA to LAD, SVG to ramus, SVG to OM, SVG to RCA), Left atrial appendage occlusion     intraop CLAIR   Ventricles  Right Ventricle:  Hypertrophy not present.  Thrombus not present.  Global function normal.    Left Ventricle:  Thrombus not present.  Global Function mildly impaired.  Ejection Fraction 50%.     Valves  Aortic Valve:  Stenosis not present.  Regurgitation trace.  Leaflets normal.  Leaflet motions normal and prolapsed.    Mitral Valve:  Stenosis not present.  Regurgitation trace.  Leaflets normal.  Leaflet motions normal.    Tricuspid Valve:  Stenosis not present.  Regurgitation absent.  Leaflet motions normal.    Pulmonic Valve:  Regurgitation trace.     2. HTN      3. HLD   - on Statin,     4.  Paroxysmal, Atrial Fibrillation    - elevated [id number] score of at least 4 - should be on life long AC -      5. ckd stage 3     6. HFpEF  cxr-IMPRESSION: Increasing vascular congestion and bibasilar atelectasis  S/p acute bumetanide with resolved congestion      CT tubes out  On PO amio

## 2023-01-14 NOTE — CONSULTS
Consults     [facility] Consult Note      Patient Name: Joshua Wolf  : 1955  MRN: [id number]  Primary Care Physician:  Eric Eubanks MD  Referring Physician: Joshua Mc MD  Date of admission: 2023    Subjective     Reason for Consult:  ckd    HPI:   Joshua Wolf is a 67 y.o. male followed by Dr. Hughes in our group for ckd3a thought due to hypertension (diabetic but no proteinuria).  He was in his usual state of health until he had chest discomfort during and after a workout at the gym and he came to the er.  He was diagnosed with an nstemi and is now s/p cabg.  His creatinine was 1.4 yesterday and 1.5 today prompting renal consultation.  It was 1.6 the last time he was in the office with us.  He does not smoke or take nsaids.  His pain is controlled but he still has coughing spells. He has had no voiding symptoms, gross hematuria, or hemoptysis.  He has no shortness of breath unless he coughs.  He has no orthopnea or pnd.    Review of Systems:   14 point review of systems is otherwise negative except for mentioned above on HPI    Personal History     Past Medical History:   Diagnosis Date   • CHF (congestive heart failure) (HCC)    • Coronary artery disease    • Diabetes mellitus (HCC)    • Hyperlipidemia    • Myocardial infarction (HCC)    • Paroxysmal A-fib (HCC)    • Seizures (HCC)        Past Surgical History:   Procedure Laterality Date   • CARDIAC CATHETERIZATION N/A 2016    Procedure: Left Heart Cath;  Surgeon: Bella Clinton MD;  Location: [facility] INVASIVE LOCATION;  Service:    • CARDIAC CATHETERIZATION N/A 10/30/2017    Procedure: Left Heart Cath;  Surgeon: Zully Gregg MD;  [facility]AS[facility];  Service:    • CARDIAC CATHETERIZATION N/A 10/30/2017    Procedure: Coronary angiography;  Surgeon: Zully Gregg MD;  Location: [facility] CATH INVASIVE LOCATION;  Service:    • CARDIAC CATHETERIZATION N/A 10/30/2017     Procedure: Stent SANIA coronary;  Surgeon: Zully Gregg MD;  Location: [facility] CATH INVASIVE LOCATION;  Service:    • CARDIAC CATHETERIZATION N/A 1/6/2023    Procedure: LEFT HEART CATH;  Surgeon: Bernardo Dominguez MD;  Location: [facility] CATH INVASIVE LOCATION;  Service: Cardiovascular;  Laterality: N/A;   • CARDIAC CATHETERIZATION N/A 1/6/2023    Procedure: CORONARY ANGIOGRAPHY;  Surgeon: Bernardo Dominguez MD;  Location: [facility] CATH INVASIVE LOCATION;  Service: Cardiovascular;  Laterality: N/A;   • CARDIAC CATHETERIZATION N/A 1/6/2023    Procedure: Resting Full Cycle Ratio;  Surgeon: Bernardo Dominguez MD;  Location: [facility] CATH INVASIVE LOCATION;  Service: Cardiovascular;  Laterality: N/A;   • CARDIAC CATHETERIZATION N/A 1/6/2023    Procedure: Left ventriculography;  Surgeon: Bernardo Dominguez MD;  Location: [facility] CATH INVASIVE LOCATION;  Service: Cardiovascular;  Laterality: N/A;   • CORONARY ARTERY BYPASS GRAFT N/A 1/10/2023    Procedure: INTRAOPERATIVE CLAIR; STERNOTOMY; ATRIAL APPENDAGE CLOSURE; CORONARY ARTERY BYPASS GRAFT TIMES FOUR USING LEFT INTERNAL MAMMARY ARTERY GRAFT UTILIZING ENDOSCOPICALLY HARVESTED LEFT GREATER SAPHENOUS  VEIN AND PRP.;  Surgeon: Dima Velasquez MD;  Location: [facility];  Service: Cardiothoracic;  Laterality: N/A;   • SC RT/LT HEART CATHETERS N/A 10/30/2017    Procedure: Percutaneous Coronary Intervention;  Surgeon: Zully Gregg MD;  Location: [facility] INVASIVE LOCATION;  Service: Cardiovascular       Family History: family history includes Alcohol abuse in an other family member; Diabetes in his sister; Heart disease in his mother and sister; Stroke in his father; Thyroid cancer in his mother.    Social History:  reports that he quit smoking about 22 years ago. His smoking use included cigarettes. He does not have any smokeless tobacco history on file. He reports current alcohol use of about 4.0 standard drinks per week. He reports that he does not use drugs.    Home  Medications:  Prior to Admission medications    Medication Sig Start Date End Date Taking? Authorizing Provider   aspirin 81 MG chewable tablet Chew 81 mg Daily. 9/30/19  Yes Nancy Chacon MD   furosemide (LASIX) 40 MG tablet Take 40 mg by mouth daily.   Yes Nancy Chacon MD   glimepiride (AMARYL) 4 MG tablet Take 1 tablet by mouth Every Morning Before Breakfast. 4/12/22  Yes Eric Eubanks MD   metFORMIN ER (GLUCOPHAGE-XR) 500 MG 24 hr tablet Take 2 tablets by mouth Daily With Breakfast. 4/12/22  Yes Eric Eubanks MD   potassium chloride (K-DUR,KLOR-CON) 20 MEQ CR tablet Take 20 mEq by mouth daily.   Yes Nancy Chacon MD   spironolactone (ALDACTONE) 25 MG tablet Take 25 mg by mouth Daily. 10/10/16  Yes Nancy Chacon MD   vitamin D (ERGOCALCIFEROL) 1.25 MG (34091 UT) capsule capsule Take 50,000 Units by mouth Every 7 (Seven) Days. 7/13/21  Yes Nancy Chacon MD   atorvastatin (LIPITOR) 80 MG tablet Take 1 tablet by mouth Daily. 4/12/22   Eric Eubanks MD   carvedilol (COREG) 25 MG tablet Take 50 mg by mouth Every Morning.    Nancy Chacon MD   carvedilol (COREG) 25 MG tablet Take 25 mg by mouth Every Evening.    Nancy Chacon MD   Blood Glucose Monitoring Suppl (Blood Glucose Monitor System) w/Device kit Use to test blood glucose up to four times daily as needed. 1/13/23   Joshua Mc MD   XARELTO 20 MG tablet Take 20 mg by mouth Daily With Dinner. 10/24/16   Nancy Chacon MD       Allergies:  No Known Allergies    Objective     Vitals:   Temp:  [97.8 °F (36.6 °C)-98.3 °F (36.8 °C)] 98.2 °F (36.8 °C)  Heart Rate:  [] 70  Resp:  [16] 16  BP: (107-131)/(64-94) 124/81    Intake/Output Summary (Last 24 hours) at 1/14/2023 1551  Last data filed at 1/14/2023 1125  Gross per 24 hour   Intake 240 ml   Output 700 ml   Net -460 ml       Physical Exam:    General Appearance: alert, oriented x 3, no acute distress   Skin: warm and dry  HEENT: oral  mucosa normal, nonicteric sclera  Neck: supple, no JVD  Lungs: CTA  Heart: RRR, normal S1 and S2  Abdomen: soft, nontender, nondistended  : no palpable bladder  Extremities: no edema, cyanosis or clubbing  Neuro: normal speech and mental status     Scheduled Meds:     amiodarone, 200 mg, Oral, Q12H  aspirin, 81 mg, Oral, Daily  atorvastatin, 80 mg, Oral, Nightly  chlorhexidine, 15 mL, Mouth/Throat, Q12H  enoxaparin, 40 mg, Subcutaneous, Q24H  guaiFENesin, 1,200 mg, Oral, Q12H  insulin glargine, 25 Units, Subcutaneous, Nightly  insulin lispro, 0-9 Units, Subcutaneous, TID With Meals  insulin lispro, 10 Units, Subcutaneous, TID With Meals  metoclopramide, 10 mg, Intravenous, Q6H  metoprolol tartrate, 37.5 mg, Oral, Q12H  mupirocin, , Each Nare, BID  pantoprazole, 40 mg, Oral, QAM  polyethylene glycol, 17 g, Oral, BID  senna-docusate sodium, 2 tablet, Oral, Nightly  tamsulosin, 0.4 mg, Oral, Daily      IV Meds:        Results Reviewed:   I have personally reviewed the results from the time of this admission to 1/14/2023 15:51 EST     Lab Results   Component Value Date    GLUCOSE 108 (H) 01/14/2023    CALCIUM 9.0 01/14/2023     (L) 01/14/2023    K 3.9 01/14/2023    CO2 26.0 01/14/2023    CL 98 01/14/2023    BUN 33 (H) 01/14/2023    CREATININE 1.55 (H) 01/14/2023    EGFRIFAFRI 49 (L) 04/14/2021    EGFRIFNONA 41 (L) 04/14/2021    BCR 21.3 01/14/2023    ANIONGAP 9.0 01/14/2023      Lab Results   Component Value Date    MG 2.3 01/11/2023    PHOS 4.5 01/11/2023    ALBUMIN 4.2 01/11/2023           Assessment / Plan     ASSESSMENT:  1. [id number] proteinuria in our office when last checked, likely due to hypertension, no worsening with surgery which is impressive  2. Benign hypertension with [id number]-esmbld controlled on current regimen  3. nstemi  4. S/p cabg  5. Type 2 dm-improving control    PLAN:  1. Avoid nephrotoxins  2. Renally dose medicines  3. Follow daily labs  4. Needs f/u with us post dc    Thank you for  involving us in the care of Joshua Wolf.  Please feel free to call with any questions.    Joesph Aragon MD  01/14/23  15:51 EST    [facility]  [phone]

## 2023-01-14 NOTE — PLAN OF CARE
Goal Outcome Evaluation:           Progress: improving  Outcome Evaluation: AV wires d/c. Overnight oximetry ordered for tonight. Patient was on 2L this morning but now on room air. BM yesterday. Ambulating well.

## 2023-01-15 LAB
ANION GAP SERPL CALCULATED.3IONS-SCNC: 9.4 MMOL/L (ref 5–15)
BUN SERPL-MCNC: 35 MG/DL (ref 8–23)
BUN/CREAT SERPL: 25.2 (ref 7–25)
CALCIUM SPEC-SCNC: 8.9 MG/DL (ref 8.6–10.5)
CHLORIDE SERPL-SCNC: 100 MMOL/L (ref 98–107)
CO2 SERPL-SCNC: 25.6 MMOL/L (ref 22–29)
CREAT SERPL-MCNC: 1.39 MG/DL (ref 0.76–1.27)
EGFRCR SERPLBLD CKD-EPI 2021: 56.3 ML/MIN/1.73
GLUCOSE BLDC GLUCOMTR-MCNC: 135 MG/DL (ref 70–130)
GLUCOSE BLDC GLUCOMTR-MCNC: 137 MG/DL (ref 70–130)
GLUCOSE BLDC GLUCOMTR-MCNC: 171 MG/DL (ref 70–130)
GLUCOSE BLDC GLUCOMTR-MCNC: 172 MG/DL (ref 70–130)
GLUCOSE SERPL-MCNC: 157 MG/DL (ref 65–99)
POTASSIUM SERPL-SCNC: 3.9 MMOL/L (ref 3.5–5.2)
SODIUM SERPL-SCNC: 135 MMOL/L (ref 136–145)

## 2023-01-15 PROCEDURE — 82962 GLUCOSE BLOOD TEST: CPT

## 2023-01-15 PROCEDURE — 25010000002 METOCLOPRAMIDE PER 10 MG

## 2023-01-15 PROCEDURE — 63710000001 INSULIN LISPRO (HUMAN) PER 5 UNITS

## 2023-01-15 PROCEDURE — 94799 UNLISTED PULMONARY SVC/PX: CPT

## 2023-01-15 PROCEDURE — 80048 BASIC METABOLIC PNL TOTAL CA: CPT

## 2023-01-15 PROCEDURE — 94618 PULMONARY STRESS TESTING: CPT

## 2023-01-15 RX ORDER — FUROSEMIDE 40 MG/1
40 TABLET ORAL DAILY
Status: DISCONTINUED | OUTPATIENT
Start: 2023-01-15 | End: 2023-01-16 | Stop reason: HOSPADM

## 2023-01-15 RX ADMIN — INSULIN LISPRO 2 UNITS: 100 INJECTION, SOLUTION INTRAVENOUS; SUBCUTANEOUS at 11:30

## 2023-01-15 RX ADMIN — GUAIFENESIN 1200 MG: 600 TABLET, EXTENDED RELEASE ORAL at 08:55

## 2023-01-15 RX ADMIN — AMIODARONE HYDROCHLORIDE 200 MG: 200 TABLET ORAL at 20:02

## 2023-01-15 RX ADMIN — INSULIN LISPRO 10 UNITS: 100 INJECTION, SOLUTION INTRAVENOUS; SUBCUTANEOUS at 07:00

## 2023-01-15 RX ADMIN — MUPIROCIN 1 APPLICATION: 20 OINTMENT TOPICAL at 20:03

## 2023-01-15 RX ADMIN — ASPIRIN 81 MG: 81 TABLET, COATED ORAL at 08:55

## 2023-01-15 RX ADMIN — METOCLOPRAMIDE HYDROCHLORIDE 10 MG: 5 INJECTION INTRAMUSCULAR; INTRAVENOUS at 02:37

## 2023-01-15 RX ADMIN — AMIODARONE HYDROCHLORIDE 200 MG: 200 TABLET ORAL at 08:55

## 2023-01-15 RX ADMIN — HYDROCODONE BITARTRATE AND ACETAMINOPHEN 2 TABLET: 5; 325 TABLET ORAL at 11:30

## 2023-01-15 RX ADMIN — DOCUSATE SODIUM 50MG AND SENNOSIDES 8.6MG 2 TABLET: 8.6; 5 TABLET, FILM COATED ORAL at 20:02

## 2023-01-15 RX ADMIN — INSULIN LISPRO 10 UNITS: 100 INJECTION, SOLUTION INTRAVENOUS; SUBCUTANEOUS at 17:49

## 2023-01-15 RX ADMIN — INSULIN LISPRO 10 UNITS: 100 INJECTION, SOLUTION INTRAVENOUS; SUBCUTANEOUS at 11:32

## 2023-01-15 RX ADMIN — CHLORHEXIDINE GLUCONATE 15 ML: 1.2 SOLUTION ORAL at 08:55

## 2023-01-15 RX ADMIN — HYDROCODONE BITARTRATE AND ACETAMINOPHEN 2 TABLET: 5; 325 TABLET ORAL at 15:36

## 2023-01-15 RX ADMIN — GUAIFENESIN 1200 MG: 600 TABLET, EXTENDED RELEASE ORAL at 20:02

## 2023-01-15 RX ADMIN — HYDROCODONE BITARTRATE AND ACETAMINOPHEN 2 TABLET: 5; 325 TABLET ORAL at 03:56

## 2023-01-15 RX ADMIN — INSULIN GLARGINE-YFGN 30 UNITS: 100 INJECTION, SOLUTION SUBCUTANEOUS at 20:02

## 2023-01-15 RX ADMIN — FUROSEMIDE 40 MG: 40 TABLET ORAL at 08:55

## 2023-01-15 RX ADMIN — PANTOPRAZOLE SODIUM 40 MG: 40 TABLET, DELAYED RELEASE ORAL at 06:59

## 2023-01-15 RX ADMIN — METOPROLOL TARTRATE 37.5 MG: 25 TABLET, FILM COATED ORAL at 20:02

## 2023-01-15 RX ADMIN — METOCLOPRAMIDE HYDROCHLORIDE 10 MG: 5 INJECTION INTRAMUSCULAR; INTRAVENOUS at 08:55

## 2023-01-15 RX ADMIN — ATORVASTATIN CALCIUM 80 MG: 80 TABLET, FILM COATED ORAL at 20:02

## 2023-01-15 RX ADMIN — METOPROLOL TARTRATE 37.5 MG: 25 TABLET, FILM COATED ORAL at 11:30

## 2023-01-15 RX ADMIN — METOCLOPRAMIDE HYDROCHLORIDE 10 MG: 5 INJECTION INTRAMUSCULAR; INTRAVENOUS at 21:32

## 2023-01-15 RX ADMIN — INSULIN LISPRO 2 UNITS: 100 INJECTION, SOLUTION INTRAVENOUS; SUBCUTANEOUS at 06:59

## 2023-01-15 RX ADMIN — METOCLOPRAMIDE HYDROCHLORIDE 10 MG: 5 INJECTION INTRAMUSCULAR; INTRAVENOUS at 15:37

## 2023-01-15 RX ADMIN — TAMSULOSIN HYDROCHLORIDE 0.4 MG: 0.4 CAPSULE ORAL at 20:02

## 2023-01-15 RX ADMIN — HYDROCODONE BITARTRATE AND ACETAMINOPHEN 2 TABLET: 5; 325 TABLET ORAL at 20:02

## 2023-01-15 NOTE — DISCHARGE SUMMARY
Date of Admission: 1/5/2023  Date of Discharge: 1/15/2023    Discharge Diagnosis:   MV CAD s/p CABG x 4, LIMA/LSVG; SUDHIR closure     Presenting Problem/History of Present Illness  Chest pain [R07.9]  Chest pain, unspecified type [R07.9]     Hospital Course  Patient is a 67 y.o. male with a PMH significant for MV CAD, Afib, CHF NYHA Class I with previous MI (LAD, stent placed 2017) , DM2, and HLD presented to [facility] ED on 1/05/23 with complaints of chest pain while exerting himself at the gym with associated diaphoresis, and left sided weakness. Troponins were found to be elevated in the ED and NSTEMI ruled in. Cardiac catheterization on 1/6 revealed severe MV disease involving the LAD, LCFx, RCA, OM2 and OM3. Echocardiogram on 1/8 revealed structurally normal heart valves and EF of 51-55%. Dr Campos assessed the patients diagnostics and recommended CABG. On 1/10, pt was taken to the OR where CABG x 4, LIMA/LSVG; SUDHIR closure was performed ([place]).     Postoperatively the pt has done well. He was extubated the day of surgery. Pt was weened off his drips on POD #1 and was transferred to stepdown unit.     Chest tubes and ramos catheter removed on POD # 2, wires and IJ kept in place after pt went into atrial fibrillation with rate of 120s and was given IV amiodarone. Pt was gently diuresed with lasix and placed on 4L NC.     Pt remained in Afib on POD #3 with improved rate of 90s and switched to PO amiodarone. IJ was taken out, wires left in place. Weened O2 to 2LNC, and switched diuretics to bumex after poor urine output. CXR revealed gas distension, pt given reglan and miralax. Lantus increased to better control BG.     Wires DC'ed on POD #4 after pt remained in SR with rate in 90s. Pt remained on 2L O2 and continued diuresis with bumex. After another day of poor urine output and increase in creatinine, pt was placed on flomax and nephrology was consulted who agreed with his current course of action.  Pt was able to pass gas and had two BMs. \    Pt on RA on POD #5 and reported improved urinary output after adding flomax. Cr improving. Lantus increased to better control BG. Pt remained in NSR, and was given the ok to discharge on 1/16 per [place], and to resume xarelto for afib anticoagulation on 1/16.     He will follow up with his PCP in one week, cardiologist in 2 weeks, and our office on **** at ***. Sternal precautions reviewed as well as signs and symptoms of sternal wound infection. Questions answered with verbalized understanding.          Procedures Performed  Procedure(s):  INTRAOPERATIVE CLAIR; STERNOTOMY; ATRIAL APPENDAGE CLOSURE; CORONARY ARTERY BYPASS GRAFT TIMES FOUR USING LEFT INTERNAL MAMMARY ARTERY GRAFT UTILIZING ENDOSCOPICALLY HARVESTED LEFT GREATER SAPHENOUS  VEIN AND PRP.       Consults:   Consults     Date and Time Order Name Status Description    1/14/2023 11:43 AM Inpatient Nephrology Consult            Pertinent Test Results:    Lab Results   Component Value Date    WBC 8.31 01/14/2023    HGB 13.2 01/14/2023    HCT 37.2 (L) 01/14/2023    MCV 84.7 01/14/2023     (L) 01/14/2023      Lab Results   Component Value Date    GLUCOSE 157 (H) 01/15/2023    CALCIUM 8.9 01/15/2023     (L) 01/15/2023    K 3.9 01/15/2023    CO2 25.6 01/15/2023     01/15/2023    BUN 35 (H) 01/15/2023    CREATININE 1.39 (H) 01/15/2023    EGFRIFAFRI 49 (L) 04/14/2021    EGFRIFNONA 41 (L) 04/14/2021    BCR 25.2 (H) 01/15/2023    ANIONGAP 9.4 01/15/2023     Lab Results   Component Value Date    INR 1.21 (H) 01/11/2023    PROTIME 15.5 (H) 01/11/2023         Condition on Discharge: Stable for discharge to ***    Vital Signs  Temp:  [97.6 °F (36.4 °C)-98.5 °F (36.9 °C)] 98.5 °F (36.9 °C)  Heart Rate:  [70-99] 86  Resp:  [16] 16  BP: (100-136)/(64-91) 117/91      Discharge Disposition      Discharge Medications     Discharge Medications      New Medications      Instructions Start Date   Accu-Chek Guide  Me w/Device kit   Use to test blood glucose up to four times daily as needed.         ASK your doctor about these medications      Instructions Start Date   aspirin 81 MG chewable tablet   81 mg, Oral, Daily      atorvastatin 80 MG tablet  Commonly known as: LIPITOR   80 mg, Oral, Daily      carvedilol 25 MG tablet  Commonly known as: COREG   25 mg, Oral, Every Evening      carvedilol 25 MG tablet  Commonly known as: COREG   50 mg, Oral, Every Morning      furosemide 40 MG tablet  Commonly known as: LASIX   40 mg, Oral, Daily      glimepiride 4 MG tablet  Commonly known as: AMARYL   4 mg, Oral, Every Morning Before Breakfast      metFORMIN  MG 24 hr tablet  Commonly known as: GLUCOPHAGE-XR   1,000 mg, Oral, Daily With Breakfast      potassium chloride 20 MEQ CR tablet  Commonly known as: K-DUR,KLOR-CON   20 mEq, Oral, Daily      spironolactone 25 MG tablet  Commonly known as: ALDACTONE   25 mg, Oral, Daily      vitamin D 1.25 MG (06290 UT) capsule capsule  Commonly known as: ERGOCALCIFEROL   50,000 Units, Oral, Every 7 Days      Xarelto 20 MG tablet  Generic drug: rivaroxaban   20 mg, Oral, Daily With Dinner             Discharge Diet:   Healthy Heart Diet    Activity at Discharge:   As tolerated, no lifting > 10 pounds x 6 weeks    Follow-up Appointments  No future appointments.  Additional Instructions for the Follow-ups that You Need to Schedule     Ambulatory Referral to Cardiac Rehab   As directed            Test Results Pending at Discharge    STSinfomation:  ***       Randy Camara PA-C  01/15/23  10:15 EST

## 2023-01-15 NOTE — PLAN OF CARE
Goal Outcome Evaluation:               Shift Summary:  Pt POD 5, VSS, Afib controlled 70s-80s, BP 110s-130s systolic, on room air. Pt given Platter for c/o pain 4/10 which relieved symptoms. Discharge tbd. Wctm.

## 2023-01-15 NOTE — PROGRESS NOTES
[facility] Progress Note      Patient Name: Joshua Wolf  : 1955  MRN: [id number]  Primary Care Physician:  Eric Eubanks MD  Date of admission: 2023    Subjective     Interval History:   No soa or cp, feels a little stronger today    Review of Systems:   14 point review of systems is otherwise negative except for mentioned above on HPI    Objective     Vitals:   Temp:  [97.6 °F (36.4 °C)-98.5 °F (36.9 °C)] 98.5 °F (36.9 °C)  Heart Rate:  [70-99] 86  Resp:  [16] 16  BP: (100-136)/(64-89) 100/65    Intake/Output Summary (Last 24 hours) at 1/15/2023 0830  Last data filed at 1/15/2023 0500  Gross per 24 hour   Intake 300 ml   Output 700 ml   Net -400 ml       Physical Exam:    General Appearance: alert, oriented x 3, no acute distress   Skin: warm and dry  HEENT: oral mucosa normal, nonicteric sclera  Neck: supple, no JVD  Lungs: CTA  Heart: RRR, normal S1 and S2  Abdomen: soft, nontender, nondistended  : no palpable bladder  Extremities: trace edema, no cyanosis or clubbing  Neuro: normal speech and mental status     Scheduled Meds:     amiodarone, 200 mg, Oral, Q12H  aspirin, 81 mg, Oral, Daily  atorvastatin, 80 mg, Oral, Nightly  chlorhexidine, 15 mL, Mouth/Throat, Q12H  enoxaparin, 40 mg, Subcutaneous, Q24H  guaiFENesin, 1,200 mg, Oral, Q12H  insulin glargine, 30 Units, Subcutaneous, Nightly  insulin lispro, 0-9 Units, Subcutaneous, TID With Meals  insulin lispro, 10 Units, Subcutaneous, TID With Meals  metoclopramide, 10 mg, Intravenous, Q6H  metoprolol tartrate, 37.5 mg, Oral, Q12H  mupirocin, , Each Nare, BID  pantoprazole, 40 mg, Oral, QAM  polyethylene glycol, 17 g, Oral, BID  senna-docusate sodium, 2 tablet, Oral, Nightly  tamsulosin, 0.4 mg, Oral, Daily      IV Meds:        Results Reviewed:   I have personally reviewed the results from the time of this admission to 1/15/2023 08:30 EST     Results from last 7 days   Lab Units 01/15/23  0322 23  0313  01/13/23  1604   SODIUM mmol/L 135* 133* 132*   POTASSIUM mmol/L 3.9 3.9 3.8   CHLORIDE mmol/L 100 98 97*   CO2 mmol/L 25.6 26.0 22.0   BUN mg/dL 35* 33* 28*   CREATININE mg/dL 1.39* 1.55* 1.43*   CALCIUM mg/dL 8.9 9.0 9.0   GLUCOSE mg/dL 157* 108* 164*     Estimated Creatinine Clearance: 66.2 mL/min (A) (by C-G formula based on SCr of 1.39 mg/dL (H)).  Results from last 7 days   Lab Units 01/11/23  0308 01/10/23  1541 01/10/23  1158   MAGNESIUM mg/dL 2.3 2.7* 2.8*   PHOSPHORUS mg/dL 4.5 2.9 3.6         Results from last 7 days   Lab Units 01/14/23  0317 01/13/23  0351 01/12/23  0417 01/11/23  0308 01/10/23  1541   WBC 10*3/mm3 8.31 9.16 7.84 8.92 9.05   HEMOGLOBIN g/dL 13.2 13.2 12.7* 11.9* 12.3*   PLATELETS 10*3/mm3 136* 116* 79* 100* 84*     Results from last 7 days   Lab Units 01/11/23  0308 01/10/23  1158   INR  1.21* 1.34*       Assessment / Plan     ASSESSMENT:  1. [id number] to hypertensive nephrosclerosis, creatinine is better than baseline today  2. Benign hypertension with ckd3-nicely controlled  3. nstemi  4. S/p cabg    PLAN:  Was on furosemide 40mg po once daily preop, will resume  Doing very well postop  Daily labs    Thank you for involving us in the care of Joshua Wolf.  Please feel free to call with any questions.    Joesph Aragon MD  01/15/23  08:30 [facility]    [facility]  [phone]

## 2023-01-15 NOTE — PROGRESS NOTES
S: no dyspnea, no feverishness,  No nausea       Breathing better than yesterday. He is not urinating a lot     Medications:  Amiodarone 200 mg bid  Aspirin 81 mg  Atorvastatin 80 mg  Furosemide 40 mg daily po  Insulin glargine 25 units pm  Insulin lispro 8 units tid  Metoprolol tartrate 37.5 mg bid  Pantoprazole 40 mg  Tamsulosin 0.4 mg daily     Exam:  Monitor shows sustained atrial fibrillation  HR 81, /72, R 16, T 98.2  Neck: no jvd  Chest: clear  Cor: no lift, normal s1 and s2, no murmur, no gallop  Ab: soft, no tenderness  Ex: no leg edema    Na 135, K 3.9, BUN 35, Cr 1.39 ...      A/P: 68 yo male with Known CAD with CP.     1. CP/CAD - unstable angina - in patient with previous PCI of LAD x 2 - repeat LHC showed diffuse disease distal to previous stents in the LAD, LCx has 80% OM1 and 70% OM2, with occlusion of OM3 distally, RCA with mid diffuse 80% disease. EF normal.   POD 2 CABG x 4 (LIMA to LAD, SVG to ramus, SVG to OM, SVG to RCA), Left atrial appendage occlusion     intraop CLAIR   Ventricles  Right Ventricle:  Hypertrophy not present.  Thrombus not present.  Global function normal.    Left Ventricle:  Thrombus not present.  Global Function mildly impaired.  Ejection Fraction 50%.     Valves  Aortic Valve:  Stenosis not present.  Regurgitation trace.  Leaflets normal.  Leaflet motions normal and prolapsed.    Mitral Valve:  Stenosis not present.  Regurgitation trace.  Leaflets normal.  Leaflet motions normal.    Tricuspid Valve:  Stenosis not present.  Regurgitation absent.  Leaflet motions normal.    Pulmonic Valve:  Regurgitation trace.     2. HTN      3. HLD   - on Statin,     4.  Paroxysmal, Atrial Fibrillation    - elevated [id number] score of at least 4 - should be on life long AC -      5. ckd stage 3 ... pre-operative dose of furosemide 40 mg daily is resumed  Creatinine lower today     6. HFpEF  cxr-IMPRESSION: Increasing vascular congestion and bibasilar atelectasis  S/p acute bumetanide  with resolved congestion  Now on daily furosemide  Would observe for a day, but agree with plan for straight cath ... he may need higher dose of diuretic      CT tubes out  On PO amio ... likely he will go home with this and start systemic anticoagulation at discharge. After he loads with amiodarone for a couple weeks, cardioversion may be appropriate

## 2023-01-15 NOTE — PROGRESS NOTES
" LOS: 8 days   Patient Care Team:  Eric Eubanks MD as PCP - General (Family Medicine)  Jamir Hughes MD as Consulting Physician (Nephrology)    Chief Complaint: post op    Subjective:  Symptoms:  Stable.  He reports cough.  No shortness of breath.    Diet:  Adequate intake.  No nausea or vomiting.    Activity level: Returning to normal.    Pain:  Pain is well controlled.      Vital Signs  Temp:  [97.6 °F (36.4 °C)-98.5 °F (36.9 °C)] 98.5 °F (36.9 °C)  Heart Rate:  [70-99] 86  Resp:  [16] 16  BP: (100-136)/(64-89) 100/65  Body mass index is 37.54 kg/m².    Intake/Output Summary (Last 24 hours) at 1/15/2023 0754  Last data filed at 1/15/2023 0500  Gross per 24 hour   Intake 300 ml   Output 700 ml   Net -400 ml     No intake/output data recorded.          01/13/23  0639 01/14/23  0604 01/15/23  0620   Weight: 117 kg (258 lb 11.2 oz) 117 kg (257 lb 14.4 oz) 115 kg (254 lb 3.2 oz)     Objective:  General Appearance:  Comfortable and in no acute distress.    Vital signs: (most recent): Blood pressure 100/65, pulse 86, temperature 98.5 °F (36.9 °C), temperature source Oral, resp. rate 16, height 175.3 cm (69\"), weight 115 kg (254 lb 3.2 oz), SpO2 94 %.  No fever.    Output: Minimal urine output and producing stool.    Lungs:  Normal effort and normal respiratory rate.  There are decreased breath sounds.  No rales.    Heart: Regular rhythm.    Abdomen: Abdomen is soft.  Bowel sounds are normal.   There is no abdominal tenderness.     Extremities: Normal range of motion.  There is no dependent edema.    Pulses: Distal pulses are intact.    Neurological: Patient is alert and oriented to person, place and time.    Skin:  Warm and dry.  (Sternal dressing clean, dry, and intact)        Results Review:        WBC WBC   Date Value Ref Range Status   01/14/2023 8.31 3.40 - 10.80 10*3/mm3 Final   01/13/2023 9.16 3.40 - 10.80 10*3/mm3 Final      HGB Hemoglobin   Date Value Ref Range Status   01/14/2023 13.2 13.0 - 17.7 g/dL " Final   01/13/2023 13.2 13.0 - 17.7 g/dL Final      HCT Hematocrit   Date Value Ref Range Status   01/14/2023 37.2 (L) 37.5 - 51.0 % Final   01/13/2023 37.5 37.5 - 51.0 % Final      Platelets Platelets   Date Value Ref Range Status   01/14/2023 136 (L) 140 - 450 10*3/mm3 Final   01/13/2023 116 (L) 140 - 450 10*3/mm3 Final        PT/INR:    No results found for: PROTIME/  No results found for: INR    Sodium Sodium   Date Value Ref Range Status   01/15/2023 135 (L) 136 - 145 mmol/L Final   01/14/2023 133 (L) 136 - 145 mmol/L Final   01/13/2023 132 (L) 136 - 145 mmol/L Final   01/13/2023 133 (L) 136 - 145 mmol/L Final      Potassium Potassium   Date Value Ref Range Status   01/15/2023 3.9 3.5 - 5.2 mmol/L Final   01/14/2023 3.9 3.5 - 5.2 mmol/L Final   01/13/2023 3.8 3.5 - 5.2 mmol/L Final   01/13/2023 3.7 3.5 - 5.2 mmol/L Final      Chloride Chloride   Date Value Ref Range Status   01/15/2023 100 98 - 107 mmol/L Final   01/14/2023 98 98 - 107 mmol/L Final   01/13/2023 97 (L) 98 - 107 mmol/L Final   01/13/2023 99 98 - 107 mmol/L Final      Bicarbonate CO2   Date Value Ref Range Status   01/15/2023 25.6 22.0 - 29.0 mmol/L Final   01/14/2023 26.0 22.0 - 29.0 mmol/L Final   01/13/2023 22.0 22.0 - 29.0 mmol/L Final   01/13/2023 24.4 22.0 - 29.0 mmol/L Final      BUN BUN   Date Value Ref Range Status   01/15/2023 35 (H) 8 - 23 mg/dL Final   01/14/2023 33 (H) 8 - 23 mg/dL Final   01/13/2023 28 (H) 8 - 23 mg/dL Final   01/13/2023 25 (H) 8 - 23 mg/dL Final      Creatinine Creatinine   Date Value Ref Range Status   01/15/2023 1.39 (H) 0.76 - 1.27 mg/dL Final   01/14/2023 1.55 (H) 0.76 - 1.27 mg/dL Final   01/13/2023 1.43 (H) 0.76 - 1.27 mg/dL Final   01/13/2023 1.44 (H) 0.76 - 1.27 mg/dL Final      Calcium Calcium   Date Value Ref Range Status   01/15/2023 8.9 8.6 - 10.5 mg/dL Final   01/14/2023 9.0 8.6 - 10.5 mg/dL Final   01/13/2023 9.0 8.6 - 10.5 mg/dL Final   01/13/2023 9.1 8.6 - 10.5 mg/dL Final      Magnesium No results  found for: MG       amiodarone, 200 mg, Oral, Q12H  aspirin, 81 mg, Oral, Daily  atorvastatin, 80 mg, Oral, Nightly  chlorhexidine, 15 mL, Mouth/Throat, Q12H  enoxaparin, 40 mg, Subcutaneous, Q24H  guaiFENesin, 1,200 mg, Oral, Q12H  insulin glargine, 30 Units, Subcutaneous, Nightly  insulin lispro, 0-9 Units, Subcutaneous, TID With Meals  insulin lispro, 10 Units, Subcutaneous, TID With Meals  metoclopramide, 10 mg, Intravenous, Q6H  metoprolol tartrate, 37.5 mg, Oral, Q12H  mupirocin, , Each Nare, BID  pantoprazole, 40 mg, Oral, QAM  polyethylene glycol, 17 g, Oral, BID  senna-docusate sodium, 2 tablet, Oral, Nightly  tamsulosin, 0.4 mg, Oral, Daily           Patient Active Problem List   Diagnosis Code   • Obesity (BMI 30.0-34.9) E66.9   • CHF (congestive heart failure), NYHA class I ([facility]) I50.9   • Type 2 diabetes mellitus without complication ([facility]) E11.9   • Hyperlipidemia E78.5   • Coronary artery disease I25.10   • Anterior myocardial infarction ([facility]) I21.09   • Skin lesion L98.9   • Chest pain R07.9   • Chest pain, unspecified type R07.9   • NSTEMI (non-ST elevated myocardial infarction) ([facility]) I21.4   • Abnormal findings on diagnostic imaging of heart and coronary circulation R93.1       Assessment & Plan   -NSTEMI, Severe multivessel CAD s/p CABGx4 LIMA/LSVG; SUDHIR closure POD#5 [place]  -Poorly controlled diabetes type 2 A1C 12  -Elevated right hemidiaphragm  -Hyperlipidemia--statin  -paroxsymal atrial fibrillation---Xarelto PTA   -CKD III  -Obesity  - post-op anemia--expected acute blood loss  - TCP--consumptive    Looks good this morning, sitting up in the chair  NSR this morning in room with rate of 80s on amio PO    O2 weaned yesterday, pt sating in mid 90s this morning. No significant desaturations during overnight sleep oximetry.   Mobilize, encourage pulmonary toilet - mucinex/flutter, duonebs   Pt reports two episodes of voiding, nothing charted. Bladder scan this morning.   Creatinine trending  down (1.55 - 1.39 today). Continue flomax 0.4mg QD.   Increase lantus to 30 mg QD.  this morning.   Resume xarelto tomorrow per [place].  Continue routine care    Plan to discharge tomorrow morning.   Randy Camara PA-C  01/15/23  07:54 EST

## 2023-01-15 NOTE — THERAPY EVALUATION
Exercise Oximetry    Patient Name:Joshua Wolf   MRN: [id number]   Date: 01/15/23             ROOM AIR BASELINE   SpO2%     96   Heart Rate    Blood Pressure      EXERCISE ON ROOM AIR SpO2% EXERCISE ON O2 @  LPM SpO2%   1 MINUTE     96 1 MINUTE    2 MINUTES     94 2 MINUTES    3 MINUTES    93 3 MINUTES    4 MINUTES    92 4 MINUTES    5 MINUTES    92 5 MINUTES    6 MINUTES     92 6 MINUTES               Distance Walked   Distance Walked   Dyspnea ([place] Scale)   Dyspnea ([place] Scale)   Fatigue ([place] Scale)   Fatigue ([place] Scale)   SpO2% Post Exercise   SpO2% Post Exercise   HR Post Exercise HR Post Exercise   Time to Recovery   Time to Recovery     Comments:     Patient walked well with me in the halls. We used a forehead probe for good measure and he showed no signs of SOA.     Walked around x3.     -aubree ngo RRT

## 2023-01-16 ENCOUNTER — HOME HEALTH ADMISSION (OUTPATIENT)
Dept: HOME HEALTH SERVICES | Facility: HOME HEALTHCARE | Age: 68
End: 2023-01-16
Payer: MEDICARE

## 2023-01-16 ENCOUNTER — READMISSION MANAGEMENT (OUTPATIENT)
Dept: CALL CENTER | Facility: HOSPITAL | Age: 68
End: 2023-01-16

## 2023-01-16 VITALS
WEIGHT: 254.5 LBS | HEART RATE: 82 BPM | OXYGEN SATURATION: 96 % | HEIGHT: 69 IN | DIASTOLIC BLOOD PRESSURE: 75 MMHG | SYSTOLIC BLOOD PRESSURE: 139 MMHG | BODY MASS INDEX: 37.69 KG/M2 | TEMPERATURE: 97.6 F | RESPIRATION RATE: 16 BRPM

## 2023-01-16 LAB
ANION GAP SERPL CALCULATED.3IONS-SCNC: 11.7 MMOL/L (ref 5–15)
BUN SERPL-MCNC: 35 MG/DL (ref 8–23)
BUN/CREAT SERPL: 23.5 (ref 7–25)
CALCIUM SPEC-SCNC: 8.7 MG/DL (ref 8.6–10.5)
CHLORIDE SERPL-SCNC: 98 MMOL/L (ref 98–107)
CO2 SERPL-SCNC: 27.3 MMOL/L (ref 22–29)
CREAT SERPL-MCNC: 1.49 MG/DL (ref 0.76–1.27)
EGFRCR SERPLBLD CKD-EPI 2021: 51.8 ML/MIN/1.73
GLUCOSE BLDC GLUCOMTR-MCNC: 184 MG/DL (ref 70–130)
GLUCOSE BLDC GLUCOMTR-MCNC: 97 MG/DL (ref 70–130)
GLUCOSE SERPL-MCNC: 79 MG/DL (ref 65–99)
POTASSIUM SERPL-SCNC: 3.2 MMOL/L (ref 3.5–5.2)
SODIUM SERPL-SCNC: 137 MMOL/L (ref 136–145)

## 2023-01-16 PROCEDURE — 80048 BASIC METABOLIC PNL TOTAL CA: CPT

## 2023-01-16 PROCEDURE — 97530 THERAPEUTIC ACTIVITIES: CPT

## 2023-01-16 PROCEDURE — 25010000002 METOCLOPRAMIDE PER 10 MG

## 2023-01-16 PROCEDURE — 63710000001 INSULIN LISPRO (HUMAN) PER 5 UNITS

## 2023-01-16 PROCEDURE — 82962 GLUCOSE BLOOD TEST: CPT

## 2023-01-16 RX ORDER — ACETAMINOPHEN 325 MG/1
650 TABLET ORAL EVERY 4 HOURS PRN
Qty: 30 TABLET | Refills: 1 | Status: SHIPPED | OUTPATIENT
Start: 2023-01-16 | End: 2023-02-16

## 2023-01-16 RX ORDER — AMIODARONE HYDROCHLORIDE 200 MG/1
200 TABLET ORAL 2 TIMES DAILY
Qty: 60 TABLET | Refills: 2 | Status: SHIPPED | OUTPATIENT
Start: 2023-01-16

## 2023-01-16 RX ORDER — TAMSULOSIN HYDROCHLORIDE 0.4 MG/1
0.4 CAPSULE ORAL DAILY
Qty: 30 CAPSULE | Refills: 0 | Status: SHIPPED | OUTPATIENT
Start: 2023-01-16 | End: 2023-02-15

## 2023-01-16 RX ORDER — INSULIN LISPRO 100 [IU]/ML
10 INJECTION, SOLUTION INTRAVENOUS; SUBCUTANEOUS
Qty: 15 ML | Refills: 0 | Status: SHIPPED | OUTPATIENT
Start: 2023-01-16

## 2023-01-16 RX ORDER — HYDROCODONE BITARTRATE AND ACETAMINOPHEN 5; 325 MG/1; MG/1
1 TABLET ORAL EVERY 4 HOURS PRN
Qty: 42 TABLET | Refills: 0 | Status: SHIPPED | OUTPATIENT
Start: 2023-01-16 | End: 2023-01-23

## 2023-01-16 RX ORDER — INSULIN GLARGINE 100 [IU]/ML
30 INJECTION, SOLUTION SUBCUTANEOUS NIGHTLY
Qty: 15 ML | Refills: 0 | Status: SHIPPED | OUTPATIENT
Start: 2023-01-16 | End: 2023-03-06 | Stop reason: SDUPTHER

## 2023-01-16 RX ORDER — LANCETS
EACH MISCELLANEOUS
Qty: 100 EACH | Refills: 1 | Status: SHIPPED | OUTPATIENT
Start: 2023-01-16

## 2023-01-16 RX ORDER — ALPRAZOLAM 0.25 MG/1
0.25 TABLET ORAL EVERY 8 HOURS PRN
Qty: 30 TABLET | Refills: 0 | Status: SHIPPED | OUTPATIENT
Start: 2023-01-16 | End: 2023-01-16

## 2023-01-16 RX ADMIN — HYDROCODONE BITARTRATE AND ACETAMINOPHEN 2 TABLET: 5; 325 TABLET ORAL at 09:28

## 2023-01-16 RX ADMIN — METOPROLOL TARTRATE 37.5 MG: 25 TABLET, FILM COATED ORAL at 08:55

## 2023-01-16 RX ADMIN — POTASSIUM CHLORIDE 40 MEQ: 750 TABLET, EXTENDED RELEASE ORAL at 08:55

## 2023-01-16 RX ADMIN — INSULIN LISPRO 10 UNITS: 100 INJECTION, SOLUTION INTRAVENOUS; SUBCUTANEOUS at 11:42

## 2023-01-16 RX ADMIN — ASPIRIN 81 MG: 81 TABLET, COATED ORAL at 08:55

## 2023-01-16 RX ADMIN — GUAIFENESIN 1200 MG: 600 TABLET, EXTENDED RELEASE ORAL at 08:55

## 2023-01-16 RX ADMIN — POLYETHYLENE GLYCOL 3350 17 G: 17 POWDER, FOR SOLUTION ORAL at 08:56

## 2023-01-16 RX ADMIN — PANTOPRAZOLE SODIUM 40 MG: 40 TABLET, DELAYED RELEASE ORAL at 06:20

## 2023-01-16 RX ADMIN — METOCLOPRAMIDE HYDROCHLORIDE 10 MG: 5 INJECTION INTRAMUSCULAR; INTRAVENOUS at 04:08

## 2023-01-16 RX ADMIN — INSULIN LISPRO 2 UNITS: 100 INJECTION, SOLUTION INTRAVENOUS; SUBCUTANEOUS at 11:42

## 2023-01-16 RX ADMIN — ACETAMINOPHEN 650 MG: 325 TABLET, FILM COATED ORAL at 06:20

## 2023-01-16 RX ADMIN — AMIODARONE HYDROCHLORIDE 200 MG: 200 TABLET ORAL at 08:55

## 2023-01-16 RX ADMIN — FUROSEMIDE 40 MG: 40 TABLET ORAL at 08:55

## 2023-01-16 NOTE — DISCHARGE SUMMARY
Date of Discharge:  1/16/2023    Discharge Diagnosis: CAD    Presenting Problem/History of Present Illness  67-year-old white male patient who been followed by my colleague  was exercising in gym yesterday morning when he felt heart.  Patient went home and laid down.  At that time he had a mild to moderate and dull ache on the left side of the chest without any radiation to arm or neck not associate with shortness of breath or diaphoresis.  Patient called EMS by the time pain was gone however he was brought to the emergency room.  EKG shows atrial fibrillation.  Patient has history of hypertension hyperlipidemia.  He has history of atrial fibrillation and has been on Xarelto.  Patient has been somewhat noncompliant with his medications.  Troponins are mildly elevated.  He had a myocardial infarction on 07/18/2016 when the LAD was 100% occluded.  He had LAD stent by Dr. Clinton.  Prior to the procedure he had ventricular fibrillation and was resuscitated.  RCA had a mid 60 to 70% gnosis.  The stent was a 2.5 and 18 Xience stent.  On 10/30/2017 patient had a myocardial infarction again and ventricular fibrillation.  At the time he was found to have 100% LAD distal to the previous stent.  He underwent stent deployment by .  RCA had a 80% stenosis and the ramus had a 70% stenosis.  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  He lives at home with his wife and adult son.  His echocardiogram in 2017 showed ejection fraction 25 to 30%.  However it has improved to about 50-55% so he had no ICD.     Chest pain [R07.9]  Chest pain, unspecified type [R07.9]     Patient Active Problem List   Diagnosis   • Obesity (BMI 30.0-34.9)   • CHF (congestive heart failure), NYHA class I ([facility])   • Type 2 diabetes mellitus without complication ([facility])   • Hyperlipidemia   • Coronary artery disease   • Anterior myocardial infarction ([facility])   • Skin lesion   • Chest pain   • Chest pain, unspecified type   • NSTEMI (non-ST  elevated myocardial infarction) ([facility])   • Abnormal findings on diagnostic imaging of heart and coronary circulation            Hospital Course  Patient is a 67 y.o. male presented with chest pain  .Findings:  1.  Left heart catheterization: LVEDP 10 mmHg . No gradient across AV valve.      2.  Left ventriculography:  LV function 55-60%  LVEF normal .      3.  Selective native coronary angiography:  A. Left main bifurcates into LAD and left circumflex.  Distal left main with 10% stenosis.  B. LAD: Large caliber vessel.  Has stents in the proximal segment.  Ostial LAD has 20% stenosis followed by proximal segment with 20 to 30% stenosis.  Mid LAD has 70% stenosis.  Distal LAD has 70% stenosis.  LAD gives rise to 1 prominent diagonal branch with luminal irregularities up to 20-30%.  C. Left circumflex: Large caliber vessel.  Gives rise to a high obtuse marginal 1 branch with mid 80% stenosis followed by a small caliber vessel OM 2 with ostial 70% stenosis.  Large caliber OM 3 distally is occluded subtotally.  D. Right coronary artery: Large-caliber dominant vessel with mid to distal junction has 80% stenosis.     4.  RFR of LAD 0.82 /hemodynamically significant.     Conclusion:  Multivessel coronary artery disease.  Normal LVEDP and LV function.     Recommendations:  Given multivessel coronary artery disease with background of diabetes, preserved LV function patient would benefit from coronary artery bypass grafting evaluation.  Aggressive risk factor modification.         Procedures Performed  Procedure(s):  INTRAOPERATIVE CLAIR; STERNOTOMY; ATRIAL APPENDAGE CLOSURE; CORONARY ARTERY BYPASS GRAFT TIMES FOUR USING LEFT INTERNAL MAMMARY ARTERY GRAFT UTILIZING ENDOSCOPICALLY HARVESTED LEFT GREATER SAPHENOUS  VEIN AND PRP.       Consults:   Consults     Date and Time Order Name Status Description    1/14/2023 11:43 AM Inpatient Nephrology Consult                Ejection Fraction  No results found for: EF    Condition on  Discharge:  stable    Physical Exam at Discharge    Vital Signs  Temp:  [97.6 °F (36.4 °C)-98.5 °F (36.9 °C)] 97.6 °F (36.4 °C)  Heart Rate:  [74-92] 74  Resp:  [16] 16  BP: (113-132)/(67-93) 123/67  Wt Readings from Last 4 Encounters:   01/16/23 115 kg (254 lb 8 oz)   04/12/22 118 kg (260 lb 12.8 oz)   10/14/21 112 kg (246 lb)   04/14/21 113 kg (250 lb)      General Appearance:    Alert, cooperative, in no acute distress   Head:    Normocephalic, without obvious abnormality, atraumatic   Eyes:            Conjunctivae normal, no icterus   Neck:   No adenopathy, supple, trachea midline, no thyromegaly, no   carotid bruit, no JVD   Lungs:     Clear to auscultation,respirations regular, even and                  unlabored    Heart:    Regular rhythm and normal rate, normal S1 and S2,            No murmur, no gallop, no rub, no click   Chest Wall:    No abnormalities observed   Abdomen:     Normal bowel sounds, no masses, no organomegaly, soft        non-tender, non-distended, no guarding, no rebound                tenderness   Rectal:     Deferred   Extremities:   No edema. Moves all extremities well, no cyanosis, no erythema   Pulses:   Pulses palpable and equal bilaterally   Skin:   No bleeding, bruising or rash   Neurologic:   Speech clear and appropriate, no facial drooping          Discharge Disposition  Home or Self Care    Discharge Medications     Discharge Medications      New Medications      Instructions Start Date   Accu-Chek Guide Me w/Device kit   Use to test blood glucose up to four times daily as needed.      acetaminophen 325 MG tablet  Commonly known as: TYLENOL   650 mg, Oral, Every 4 Hours PRN      ALPRAZolam 0.25 MG tablet  Commonly known as: XANAX   0.25 mg, Oral, Every 8 Hours PRN      amiodarone 200 MG tablet  Commonly known as: PACERONE   200 mg, Oral, 2 Times Daily      HYDROcodone-acetaminophen 5-325 MG per tablet  Commonly known as: NORCO   1 tablet, Oral, Every 4 Hours PRN      metoprolol  tartrate 25 MG tablet  Commonly known as: LOPRESSOR   37.5 mg, Oral, Every 12 Hours Scheduled      tamsulosin 0.4 MG capsule 24 hr capsule  Commonly known as: FLOMAX   0.4 mg, Oral, Daily         Continue These Medications      Instructions Start Date   aspirin 81 MG chewable tablet   81 mg, Oral, Daily      atorvastatin 80 MG tablet  Commonly known as: LIPITOR   80 mg, Oral, Daily      furosemide 40 MG tablet  Commonly known as: LASIX   40 mg, Oral, Daily      glimepiride 4 MG tablet  Commonly known as: AMARYL   4 mg, Oral, Every Morning Before Breakfast      metFORMIN  MG 24 hr tablet  Commonly known as: GLUCOPHAGE-XR   1,000 mg, Oral, Daily With Breakfast      potassium chloride 20 MEQ CR tablet  Commonly known as: K-DUR,KLOR-CON   20 mEq, Oral, Daily      spironolactone 25 MG tablet  Commonly known as: ALDACTONE   25 mg, Oral, Daily      vitamin D 1.25 MG (53765 UT) capsule capsule  Commonly known as: ERGOCALCIFEROL   50,000 Units, Oral, Every 7 Days      Xarelto 20 MG tablet  Generic drug: rivaroxaban   20 mg, Oral, Daily With Dinner         Stop These Medications    carvedilol 25 MG tablet  Commonly known as: COREG            Discharge Diet:     Activity at Discharge:     Follow-up Appointments  Future Appointments   Date Time Provider Department Center   2/15/2023  9:00 AM Gita Reed APRN [facility]     Additional Instructions for the Follow-ups that You Need to Schedule     Ambulatory Referral to Cardiac Rehab   As directed      Ambulatory Referral to Home Health (Hospital)   As directed      Face to Face Visit Date: 1/16/2023    Follow-up provider for Plan of Care?: I will be treating the patient on an ongoing basis.  Please send me the Plan of Care for signature.    Follow-up provider: SAMMY PARRA [[id number]]    Reason/Clinical Findings: post op open heart    Describe mobility limitations that make leaving home difficult: post operative weakness    Nursing/Therapeutic Services  Requested: Skilled Nursing    Skilled nursing orders: Post CABG care    Frequency: 1 Week 1         Call MD With Problems / Concerns   As directed      Instructions:  Call office at [phone] for any drainage, increased redness, or fever over 100.5    Order Comments: Instructions:  Call office at [phone] for any drainage, increased redness, or fever over 100.5          Discharge Follow-up with PCP   As directed       Currently Documented PCP:    Eric Eubanks MD    PCP Phone Number:    [phone]     Follow Up Details: in 1 week         Discharge Follow-up with Specialty: Cardiologist BREANNE/PA; 1 Week   As directed      Specialty: Cardiologist BREANNE/PA    Follow Up: 1 Week    Follow Up Details: bring all prescription bottles to appointment, call for appointment         Discharge Follow-up with Specified Provider: Cardiologist; 1 Month   As directed      To: Cardiologist    Follow Up: 1 Month    Follow Up Details: call for appointment, bring all medication bottles to appointment         Discharge Follow-up with Specified Provider: BREANNE Stone on 2/15/23 at 9 am   As directed      To: BREANNE Stone on 2/15/23 at 9 am    Follow Up Details: bring all current medications to appointment               Test Results at Discharge  Lab Results   Component Value Date    GLUCOSE 79 01/16/2023    CALCIUM 8.7 01/16/2023     01/16/2023    K 3.2 (L) 01/16/2023    CO2 27.3 01/16/2023    CL 98 01/16/2023    BUN 35 (H) 01/16/2023    CREATININE 1.49 (H) 01/16/2023    EGFRIFAFRI 49 (L) 04/14/2021    EGFRIFNONA 41 (L) 04/14/2021    BCR 23.5 01/16/2023    ANIONGAP 11.7 01/16/2023        Lab Results   Component Value Date    GLUCOSE 79 01/16/2023    BUN 35 (H) 01/16/2023    CREATININE 1.49 (H) 01/16/2023    EGFRIFNONA 41 (L) 04/14/2021    EGFRIFAFRI 49 (L) 04/14/2021    BCR 23.5 01/16/2023    CO2 27.3 01/16/2023    CALCIUM 8.7 01/16/2023    PROTENTOTREF 6.8 04/12/2022    ALBUMIN 4.2 01/11/2023    LABIL2 1.8  04/12/2022    AST 12 01/05/2023    ALT 20 01/05/2023        No results found for: TROPONINT, MG    Lab Results   Component Value Date    WBC 8.31 01/14/2023    HGB 13.2 01/14/2023    HCT 37.2 (L) 01/14/2023    MCV 84.7 01/14/2023     (L) 01/14/2023      Lab Results   Component Value Date    CHOL 189 01/07/2023    CHOL 166 10/30/2017    CHOL 191 07/19/2016     Lab Results   Component Value Date    HDL 37 (L) 01/07/2023    HDL 44 04/12/2022    HDL 39 (L) 04/14/2021     Lab Results   Component Value Date     (H) 01/07/2023    LDL 68 04/12/2022     (H) 04/14/2021     Lab Results   Component Value Date    TRIG 192 (H) 01/07/2023    TRIG 79 04/12/2022    TRIG 122 04/14/2021     No components found for: CHOLHDL   Lab Results   Component Value Date    HGBA1C 12.40 (H) 01/07/2023      Lab Results   Component Value Date    TSH 3.960 04/14/2021      No results found for: PTT  No components found for: PT/INR     Tyson Chun MD  01/16/23  11:22 EST    Time: 35 mts

## 2023-01-16 NOTE — OUTREACH NOTE
Prep Survey    Flowsheet Row Responses   [facility] facility patient discharged from? [place]   Is LACE score < 7 ? No   Eligibility [facility]   Date of Admission 01/05/23   Date of Discharge 01/16/23   Discharge Disposition Home-Health Care Svc   Discharge diagnosis NSTEMI, CABG x 4 using left leg vein harvest, atrial appendage closure, T2DM, CHF   Does the patient have one of the following disease processes/diagnoses(primary or secondary)? Cardiothoracic surgery   Does the patient have Home health ordered? Yes   What is the Home health agency?  [facility]   Is there a DME ordered? No   Prep survey completed? Yes          WESTON FONTANA - Registered Nurse

## 2023-01-16 NOTE — PROGRESS NOTES
" LOS: 9 days   Patient Care Team:  Eric Eubanks MD as PCP - General (Family Medicine)  Jamir Hughes MD as Consulting Physician (Nephrology)    Chief Complaint: post op    Subjective:  Symptoms:  Stable.  He reports cough.  No shortness of breath.    Diet:  Adequate intake.  No nausea or vomiting.    Activity level: Returning to normal.    Pain:  Pain is well controlled.      Vital Signs  Temp:  [97.6 °F (36.4 °C)-98.5 °F (36.9 °C)] 97.6 °F (36.4 °C)  Heart Rate:  [74-92] 74  Resp:  [16] 16  BP: (113-132)/(67-93) 123/67  Body mass index is 37.58 kg/m².    Intake/Output Summary (Last 24 hours) at 1/16/2023 0754  Last data filed at 1/16/2023 0500  Gross per 24 hour   Intake 600 ml   Output 1650 ml   Net -1050 ml     No intake/output data recorded.          01/14/23  0604 01/15/23  0620 01/16/23  0500   Weight: 117 kg (257 lb 14.4 oz) 115 kg (254 lb 3.2 oz) 115 kg (254 lb 8 oz)     Objective:  General Appearance:  Comfortable and in no acute distress.    Vital signs: (most recent): Blood pressure 123/67, pulse 74, temperature 97.6 °F (36.4 °C), temperature source Oral, resp. rate 16, height 175.3 cm (69\"), weight 115 kg (254 lb 8 oz), SpO2 96 %.  No fever.    Output: Minimal urine output and producing stool.    Lungs:  Normal effort and normal respiratory rate.  There are decreased breath sounds.  No rales.    Heart: Regular rhythm.    Abdomen: Abdomen is soft.  Bowel sounds are normal.   There is no abdominal tenderness.     Extremities: Normal range of motion.  There is no dependent edema.    Pulses: Distal pulses are intact.    Neurological: Patient is alert and oriented to person, place and time.    Skin:  Warm and dry.  (Sternal dressing clean, dry, and intact)        Results Review:        WBC WBC   Date Value Ref Range Status   01/14/2023 8.31 3.40 - 10.80 10*3/mm3 Final      HGB Hemoglobin   Date Value Ref Range Status   01/14/2023 13.2 13.0 - 17.7 g/dL Final      HCT Hematocrit   Date Value Ref Range " Status   01/14/2023 37.2 (L) 37.5 - 51.0 % Final      Platelets Platelets   Date Value Ref Range Status   01/14/2023 136 (L) 140 - 450 10*3/mm3 Final        PT/INR:    No results found for: PROTIME/  No results found for: INR    Sodium Sodium   Date Value Ref Range Status   01/16/2023 137 136 - 145 mmol/L Final   01/15/2023 135 (L) 136 - 145 mmol/L Final   01/14/2023 133 (L) 136 - 145 mmol/L Final   01/13/2023 132 (L) 136 - 145 mmol/L Final      Potassium Potassium   Date Value Ref Range Status   01/16/2023 3.2 (L) 3.5 - 5.2 mmol/L Final   01/15/2023 3.9 3.5 - 5.2 mmol/L Final   01/14/2023 3.9 3.5 - 5.2 mmol/L Final   01/13/2023 3.8 3.5 - 5.2 mmol/L Final      Chloride Chloride   Date Value Ref Range Status   01/16/2023 98 98 - 107 mmol/L Final   01/15/2023 100 98 - 107 mmol/L Final   01/14/2023 98 98 - 107 mmol/L Final   01/13/2023 97 (L) 98 - 107 mmol/L Final      Bicarbonate CO2   Date Value Ref Range Status   01/16/2023 27.3 22.0 - 29.0 mmol/L Final   01/15/2023 25.6 22.0 - 29.0 mmol/L Final   01/14/2023 26.0 22.0 - 29.0 mmol/L Final   01/13/2023 22.0 22.0 - 29.0 mmol/L Final      BUN BUN   Date Value Ref Range Status   01/16/2023 35 (H) 8 - 23 mg/dL Final   01/15/2023 35 (H) 8 - 23 mg/dL Final   01/14/2023 33 (H) 8 - 23 mg/dL Final   01/13/2023 28 (H) 8 - 23 mg/dL Final      Creatinine Creatinine   Date Value Ref Range Status   01/16/2023 1.49 (H) 0.76 - 1.27 mg/dL Final   01/15/2023 1.39 (H) 0.76 - 1.27 mg/dL Final   01/14/2023 1.55 (H) 0.76 - 1.27 mg/dL Final   01/13/2023 1.43 (H) 0.76 - 1.27 mg/dL Final      Calcium Calcium   Date Value Ref Range Status   01/16/2023 8.7 8.6 - 10.5 mg/dL Final   01/15/2023 8.9 8.6 - 10.5 mg/dL Final   01/14/2023 9.0 8.6 - 10.5 mg/dL Final   01/13/2023 9.0 8.6 - 10.5 mg/dL Final      Magnesium No results found for: MG       amiodarone, 200 mg, Oral, Q12H  aspirin, 81 mg, Oral, Daily  atorvastatin, 80 mg, Oral, Nightly  furosemide, 40 mg, Oral, Daily  guaiFENesin, 1,200 mg,  Oral, Q12H  insulin glargine, 30 Units, Subcutaneous, Nightly  insulin lispro, 0-9 Units, Subcutaneous, TID With Meals  insulin lispro, 10 Units, Subcutaneous, TID With Meals  metoclopramide, 10 mg, Intravenous, Q6H  metoprolol tartrate, 37.5 mg, Oral, Q12H  mupirocin, , Each Nare, BID  pantoprazole, 40 mg, Oral, QAM  polyethylene glycol, 17 g, Oral, BID  rivaroxaban, 20 mg, Oral, Daily With Dinner  senna-docusate sodium, 2 tablet, Oral, Nightly  tamsulosin, 0.4 mg, Oral, Daily           Patient Active Problem List   Diagnosis Code   • Obesity (BMI 30.0-34.9) E66.9   • CHF (congestive heart failure), NYHA class I ([facility]) I50.9   • Type 2 diabetes mellitus without complication ([facility]) E11.9   • Hyperlipidemia E78.5   • Coronary artery disease I25.10   • Anterior myocardial infarction ([facility]) I21.09   • Skin lesion L98.9   • Chest pain R07.9   • Chest pain, unspecified type R07.9   • NSTEMI (non-ST elevated myocardial infarction) ([facility]) I21.4   • Abnormal findings on diagnostic imaging of heart and coronary circulation R93.1       Assessment & Plan   -NSTEMI, Severe multivessel CAD s/p CABGx4 LIMA/LSVG; SUDHIR closure POD#5 [place]  -Poorly controlled diabetes type 2 A1C 12  -Elevated right hemidiaphragm  -Hyperlipidemia--statin  -paroxsymal atrial fibrillation---Xarelto PTA   -CKD III  -Obesity  - post-op anemia--expected acute blood loss  - TCP--consumptive    Looks good this morning, sitting up in the chair  NSR, rate 90s   Room air and tolerating   Mobilize, encourage pulmonary toilet - mucinex/flutter, duonebs   Good urine output yesterday -- continue flomax  Creatinine at baseline-- diuretics per nephrology  Xarelto -- 1st dose 1/15  Continue beta blocker, ASA, statin    OK for d/c home today with home health from our standpoint. Follow-up in our office on 2/15 at 9am and follow up BMP in one week.      Plan to discharge tomorrow morning.   Savanah Valdez, APRN  01/16/23  07:54 EST

## 2023-01-16 NOTE — PLAN OF CARE
Problem: Adult Inpatient Plan of Care  Goal: Plan of Care Review  Outcome: Ongoing, Progressing  Flowsheets (Taken 1/15/2023 1830)  Progress: no change  Plan of Care Reviewed With: patient  Outcome Evaluation: Pt VSS, pt c/o pain x1 and controlled with prn pain meds, up in chair for most of shift, pt voiding small amounts throughout shift, Bladder scan showed greater than 700 and pt able to void over 300, post void residual showed 336 in his bladder. Pt family at bedside for part of shift, remained on RA  Goal: Patient-Specific Goal (Individualized)  Outcome: Ongoing, Progressing  Goal: Absence of Hospital-Acquired Illness or Injury  Outcome: Ongoing, Progressing  Intervention: Identify and Manage Fall Risk  Recent Flowsheet Documentation  Taken 1/15/2023 1800 by Hanane Campos RN  Safety Promotion/Fall Prevention:   safety round/check completed   nonskid shoes/slippers when out of bed   fall prevention program maintained  Taken 1/15/2023 1640 by Hanane Campos RN  Safety Promotion/Fall Prevention:   safety round/check completed   nonskid shoes/slippers when out of bed   fall prevention program maintained  Taken 1/15/2023 1440 by Hanane Campos RN  Safety Promotion/Fall Prevention:   safety round/check completed   nonskid shoes/slippers when out of bed   fall prevention program maintained  Taken 1/15/2023 1230 by Hanane Campos RN  Safety Promotion/Fall Prevention:   safety round/check completed   nonskid shoes/slippers when out of bed   fall prevention program maintained  Taken 1/15/2023 1025 by Hanane Campos RN  Safety Promotion/Fall Prevention:   safety round/check completed   nonskid shoes/slippers when out of bed   fall prevention program maintained  Taken 1/15/2023 0855 by Hanane Campos RN  Safety Promotion/Fall Prevention:   safety round/check completed   nonskid shoes/slippers when out of bed   fall prevention program maintained  Intervention: Prevent Skin Injury  Recent Flowsheet  Documentation  Taken 1/15/2023 1800 by Hanane Campos RN  Body Position: (pt ambulated to bed and then back to chair with this RN) --  Taken 1/15/2023 1640 by Hanane Campos RN  Body Position: (pt up in chair and readjusted position) other (see comments)  Skin Protection:   adhesive use limited   incontinence pads utilized  Taken 1/15/2023 1440 by Hanane Campos RN  Body Position: (up in chair, pt weight shifted) position changed independently  Taken 1/15/2023 1230 by Hanane Campos RN  Body Position: (up in chair, position adjusted per pt) --  Skin Protection:   adhesive use limited   incontinence pads utilized  Taken 1/15/2023 1025 by Hanane Campos RN  Body Position: position changed independently  Taken 1/15/2023 0855 by Hanane Campos RN  Body Position: (pt up in chair) position changed independently  Skin Protection:   adhesive use limited   incontinence pads utilized  Intervention: Prevent and Manage VTE (Venous Thromboembolism) Risk  Recent Flowsheet Documentation  Taken 1/15/2023 1800 by Hanane Campos RN  Activity Management: up in chair  Taken 1/15/2023 1640 by Hanane Campos RN  Activity Management: up in chair  Taken 1/15/2023 1440 by Hanane Campos RN  Activity Management: up in chair  Taken 1/15/2023 1230 by Hanane Campos RN  Activity Management: up in chair  VTE Prevention/Management:   bilateral   sequential compression devices on  Taken 1/15/2023 1025 by Hanane Campos RN  Activity Management: up in chair  Taken 1/15/2023 0855 by Hanane Campos RN  Activity Management: activity adjusted per tolerance  VTE Prevention/Management:   bilateral   compression stockings on  Intervention: Prevent Infection  Recent Flowsheet Documentation  Taken 1/15/2023 1800 by Hanane Campos RN  Infection Prevention: rest/sleep promoted  Taken 1/15/2023 1640 by Hanane Campos RN  Infection Prevention: rest/sleep promoted  Taken 1/15/2023 1440 by Hanane Campos  RN  Infection Prevention: rest/sleep promoted  Taken 1/15/2023 1230 by Hanane Campos RN  Infection Prevention: rest/sleep promoted  Taken 1/15/2023 1025 by Hanane Campos RN  Infection Prevention: rest/sleep promoted  Taken 1/15/2023 0855 by Hanane Campos RN  Infection Prevention: rest/sleep promoted  Goal: Optimal Comfort and Wellbeing  Outcome: Ongoing, Progressing  Intervention: Monitor Pain and Promote Comfort  Recent Flowsheet Documentation  Taken 1/15/2023 1230 by Hanane Campos RN  Pain Management Interventions:   pain management plan reviewed with patient/caregiver   care clustered   quiet environment facilitated  Intervention: Provide Person-Centered Care  Recent Flowsheet Documentation  Taken 1/15/2023 1640 by Hanane Campos RN  Trust Relationship/Rapport:   care explained   choices provided   questions answered   questions encouraged   thoughts/feelings acknowledged  Taken 1/15/2023 1230 by Hanane Campos RN  Trust Relationship/Rapport:   care explained   choices provided   questions answered   questions encouraged   thoughts/feelings acknowledged  Taken 1/15/2023 0855 by Hanane Campos RN  Trust Relationship/Rapport:   care explained   choices provided   questions answered   questions encouraged   thoughts/feelings acknowledged  Goal: Readiness for Transition of Care  Outcome: Ongoing, Progressing

## 2023-01-16 NOTE — THERAPY TREATMENT NOTE
Patient Name: Joshua Wolf  : 1955    MRN: [id number]                              Today's Date: 2023       Admit Date: 2023    Visit Dx:     ICD-10-CM ICD-9-CM   1. NSTEMI (non-ST elevated myocardial infarction) (HCC)  I21.4 410.70   2. Chest pain, unspecified type  R07.9 786.50   3. Abnormal findings on diagnostic imaging of heart and coronary circulation  R93.1 794.39   4. S/P CABG (coronary artery bypass graft)  Z95.1 V45.81   5. Chest pain due to myocardial ischemia, unspecified ischemic chest pain type  I25.9 786.50     Patient Active Problem List   Diagnosis   • Obesity (BMI 30.0-34.9)   • CHF (congestive heart failure), NYHA class I (HCC)   • Type 2 diabetes mellitus without complication (HCC)   • Hyperlipidemia   • Coronary artery disease   • Anterior myocardial infarction (HCC)   • Skin lesion   • Chest pain   • Chest pain, unspecified type   • NSTEMI (non-ST elevated myocardial infarction) ([facility])   • Abnormal findings on diagnostic imaging of heart and coronary circulation     Past Medical History:   Diagnosis Date   • CHF (congestive heart failure) ([facility])    • Coronary artery disease    • Diabetes mellitus (HCC)    • Hyperlipidemia    • Myocardial infarction (HCC)    • Paroxysmal A-fib ([facility])    • Seizures ([facility])      Past Surgical History:   Procedure Laterality Date   • CARDIAC CATHETERIZATION N/A 2016    Procedure: Left Heart Cath;  Surgeon: Bella Clinton MD;  Location: [facility] INVAS[facility] LOC[facility];  Service:    • CARDIAC CATHETERIZATION N/A 10/30/2017    Procedure: Left Heart Cath;  Surgeon: Zully Gregg MD;  Location: [facility] CATH INVASIVE LOCATION;  Service:    • CARDIAC CATHETERIZATION N/A 10/30/2017    Procedure: Coronary angiography;  Surgeon: Zully Gregg MD;  Location: [facility] CATH INVASIVE LOCATION;  Service:    • CARDIAC CATHETERIZATION N/A 10/30/2017    Procedure: Stent SANIA coronary;  Surgeon: Zully Gregg MD;  Location: [facility] CATH INVASIVE LOCATION;   Service:    • CARDIAC CATHETERIZATION N/A 1/6/2023    Procedure: LEFT HEART CATH;  Surgeon: Bernardo Dominguez MD;  Location: [facility] CATH INVASIVE LOCATION;  Service: Cardiovascular;  Laterality: N/A;   • CARDIAC CATHETERIZATION N/A 1/6/2023    Procedure: CORONARY ANGIOGRAPHY;  Surgeon: Bernardo Dominguez MD;  Location: [facility] CATH INVASIVE LOCATION;  Service: Cardiovascular;  Laterality: N/A;   • CARDIAC CATHETERIZATION N/A 1/6/2023    Procedure: Resting Full Cycle Ratio;  Surgeon: Bernardo Dominguez MD;  Location: [facility] CATH INVASIVE LOCATION;  Service: Cardiovascular;  Laterality: N/A;   • CARDIAC CATHETERIZATION N/A 1/6/2023    Procedure: Left ventriculography;  Surgeon: Bernardo Dominguez MD;  Location: [facility] CATH INVASIVE LOCATION;  Service: Cardiovascular;  Laterality: N/A;   • CORONARY ARTERY BYPASS GRAFT N/A 1/10/2023    Procedure: INTRAOPERATIVE CLAIR; STERNOTOMY; ATRIAL APPENDAGE CLOSURE; CORONARY ARTERY BYPASS GRAFT TIMES FOUR USING LEFT INTERNAL MAMMARY ARTERY GRAFT UTILIZING ENDOSCOPICALLY HARVESTED LEFT GREATER SAPHENOUS  VEIN AND PRP.;  Surgeon: Dima Velasquez MD;  Location: [facility];  Service: Cardiothoracic;  Laterality: N/A;   • CO RT/LT HEART CATHETERS N/A 10/30/2017    Procedure: Percutaneous Coronary Intervention;  Surgeon: Zully Gregg MD;  Location: [facility] CATH INVASIVE LOCATION;  Service: Cardiovascular      General Information     Row Name 01/16/23 1138          Physical Therapy Time and Intention    Document Type therapy note (daily note)  -MS     Mode of Treatment physical therapy;individual therapy  -MS     Row Name 01/16/23 1138          General Information    Patient Profile Reviewed yes  -MS     Existing Precautions/Restrictions cardiac;sternal  -MS     Barriers to Rehab none identified  -MS     Row Name 01/16/23 1138          Cognition    Orientation Status (Cognition) oriented x 3  -MS           User Key  (r) = Recorded By, (t) = Taken By, (c) = Cosigned By    Initials Name  Provider Type    Mata Shelley, PT Physical Therapist               Mobility     Row Name 01/16/23 1138          Bed Mobility    Comment, (Bed Mobility) Up in chair this AM.  -MS     Row Name 01/16/23 1138          Sit-Stand Transfer    Sit-Stand [place] (Transfers) standby assist  -MS     Row Name 01/16/23 1138          Gait/Stairs (Locomotion)    [place] Level (Gait) standby assist  -MS     Distance in Feet (Gait) 360 feet  -MS           User Key  (r) = Recorded By, (t) = Taken By, (c) = Cosigned By    Initials Name Provider Type    Mata Shelley, PT Physical Therapist               Obj/Interventions     Row Name 01/16/23 1139          Motor Skills    Therapeutic Exercise --  Cardiac ther. ex. program x 10 reps completed  -MS           User Key  (r) = Recorded By, (t) = Taken By, (c) = Cosigned By    Initials Name Provider Type    Mata Shelley, PT Physical Therapist               Goals/Plan    No documentation.                Clinical Impression     Row Name 01/16/23 1139          Pain    Pretreatment Pain Rating 2/10  -MS     Posttreatment Pain Rating 2/10  -MS     Pain Location incisional  -MS     Pain Location - chest  -MS     Row Name 01/16/23 1139          Positioning and Restraints    Pre-Treatment Position sitting in chair/recliner  -MS     Post Treatment Position chair  -MS     In Chair notified nsg;sitting;call light within reach;encouraged to call for assist;with family/caregiver  -MS           User Key  (r) = Recorded By, (t) = Taken By, (c) = Cosigned By    Initials Name Provider Type    Mata Shelley, PT Physical Therapist               Outcome Measures     Row Name 01/16/23 1139 01/16/23 0855       How much help from another person do you currently need...    Turning from your back to your side while in flat bed without using bedrails? 4  -MS 4  -HQ    Moving from lying on back to sitting on the side of a flat bed without bedrails? 3  -MS 4  -HQ    Moving to and  from a bed to a chair (including a wheelchair)? 3  -MS 4  -HQ    Standing up from a chair using your arms (e.g., wheelchair, bedside chair)? 3  -MS 4  -HQ    Climbing 3-5 steps with a railing? 3  -MS 3  -HQ    To walk in hospital room? 3  -MS 4  -HQ    AM-PAC 6 Clicks Score (PT) 19  -MS 23  -HQ    Highest level of mobility 6 --> Walked 10 steps or more  -MS 7 --> Walked 25 feet or more  -HQ    Row Name 01/16/23 1139          Functional Assessment    Outcome Measure Options AM-PAC 6 Clicks Basic Mobility (PT)  -MS           User Key  (r) = Recorded By, (t) = Taken By, (c) = Cosigned By    Initials Name Provider Type    Mata Shelley, PT Physical Therapist     Seda Rubi RN Registered Nurse                             Physical Therapy Education     Title: PT OT SLP Therapies (Done)     Topic: Physical Therapy (Done)     Point: Mobility training (Done)     Learning Progress Summary           Patient Acceptance, E,D, VU,NR by MS at 1/16/2023 1140    Acceptance, E, VU,NR by ARLINE at 1/14/2023 1125    Acceptance, E, NR by  at 1/13/2023 1219    Acceptance, E, NR by  at 1/13/2023 1215    Acceptance, E, VU by MURTAZA at 1/12/2023 1437    Acceptance, E, VU,NR by MURTAZA at 1/12/2023 1428    Acceptance, E, NR by AR at 1/11/2023 1131                   Point: Home exercise program (Done)     Learning Progress Summary           Patient Acceptance, E,D, VU,NR by MS at 1/16/2023 1140    Acceptance, E, VU,NR by ARLINE at 1/14/2023 1125    Acceptance, E, NR by  at 1/13/2023 1219    Acceptance, E, NR by  at 1/13/2023 1215    Acceptance, E, VU,NR by MURTAZA at 1/12/2023 1428    Acceptance, E, NR by AR at 1/11/2023 1131                   Point: Body mechanics (Done)     Learning Progress Summary           Patient Acceptance, E,D, VU,NR by MS at 1/16/2023 1140    Acceptance, E, VU,NR by ARLINE at 1/14/2023 1125    Acceptance, E, NR by  at 1/13/2023 1219    Acceptance, E, NR by  at 1/13/2023 1215    Acceptance, E, VU by MURTAZA at 1/12/2023  1437    Acceptance, E, VU,NR by DJ at 1/12/2023 1428    Acceptance, E, NR by AR at 1/11/2023 1131                   Point: Precautions (Done)     Learning Progress Summary           Patient Acceptance, E,D, VU,NR by MS at 1/16/2023 1140    Acceptance, E, VU,NR by  at 1/14/2023 1125    Acceptance, E, NR by  at 1/13/2023 1219    Acceptance, E, NR by  at 1/13/2023 1215    Acceptance, E, VU by DJ at 1/12/2023 1437    Acceptance, E, VU,NR by DJ at 1/12/2023 1428    Acceptance, E, NR by AR at 1/11/2023 1131                               User Key     Initials Effective Dates Name Provider Type Discipline     06/16/21 -  Tatyana Nails, PT Physical Therapist PT     06/16/21 -  Xiao Mcduffie, PT Physical Therapist PT    MS 06/16/21 -  Mata Mccall, PT Physical Therapist PT    AR 06/16/21 -  Magalie García, PT Physical Therapist PT     10/25/19 -  Bhakti Oliveira, PT Physical Therapist PT              PT Recommendation and Plan     Plan of Care Reviewed With: patient  Outcome Evaluation: Upon entering room, pt. sitting up in chair, awake/alert, and agreeable to work with P.T. this date. Pt. able to ambulate 360 feet, SBA x 1, with no use of A.D. this AM.  Pt. requires SBA x 1 for sit <-> stand transfers.  Cardiac ther. ex. program x 10 reps completed for general strengthening/stretching.  Will continue to progress functional mobility as tolerated.     Time Calculation:    PT Charges     Row Name 01/16/23 1143             Time Calculation    Start Time 1020  -MS      Stop Time 1035  -MS      Time Calculation (min) 15 min  -MS      PT Received On 01/16/23  -MS      PT - Next Appointment 01/17/23  -MS         Time Calculation- PT    Total Timed Code Minutes- PT 14 minute(s)  -MS            User Key  (r) = Recorded By, (t) = Taken By, (c) = Cosigned By    Initials Name Provider Type    MS Mata Mccall, PT Physical Therapist              Therapy Charges for Today     Code Description Service Date Service  Provider Modifiers Qty    [id number]  PT THERAPEUTIC ACT EA 15 MIN 1/16/2023 Mata Mccall, PT GP 1          PT G-Codes  Outcome Measure Options: AM-PAC 6 Clicks Basic Mobility (PT)  AM-PAC 6 Clicks Score (PT): 19       Mata Mccall, PT  1/16/2023

## 2023-01-16 NOTE — PROGRESS NOTES
[facility] given referral and noted order in for home SN following CABG.  All info was verified and phone call to patient this AM explaining plan to set up visit within 48 hours and he is agreeable.  No current home health voiced.  Noted BMP in one week and confirmed with Savanah RAYMUNDO that we are able to do and this was confirmed for home health to draw. Call results to cardiac surgery.

## 2023-01-17 ENCOUNTER — HOME CARE VISIT (OUTPATIENT)
Dept: HOME HEALTH SERVICES | Facility: HOME HEALTHCARE | Age: 68
End: 2023-01-17
Payer: MEDICARE

## 2023-01-17 ENCOUNTER — TRANSITIONAL CARE MANAGEMENT TELEPHONE ENCOUNTER (OUTPATIENT)
Dept: CALL CENTER | Facility: HOSPITAL | Age: 68
End: 2023-01-17

## 2023-01-17 PROCEDURE — G0299 HHS/HOSPICE OF RN EA 15 MIN: HCPCS

## 2023-01-17 NOTE — CASE MANAGEMENT/SOCIAL WORK
Case Management Discharge Note      Final Note: Pt discharged home with [facility] - Candis WOMACK /JOS STEINER.    Provided Post Acute Provider List?: N/A  Provided Post Acute Provider Quality & Resource List?: N/A    Selected Continued Care - Discharged on 1/16/2023 Admission date: 1/5/2023 - Discharge disposition: Home or Self Care    Destination    No services have been selected for the patient.              Durable Medical Equipment    No services have been selected for the patient.              Dialysis/Infusion    No services have been selected for the patient.              Home Medical Care Coordination complete.    Service Provider Selected Services Address Phone Fax Patient Preferred    [facility] [address] [address] [phone] --          Therapy    No services have been selected for the patient.              Community Resources    No services have been selected for the patient.              Community & DME    No services have been selected for the patient.                       Final Discharge Disposition Code: 06 - home with home health care

## 2023-01-17 NOTE — OUTREACH NOTE
Call Center TCM Note    Flowsheet Row Responses   [facility], [place] facility patient discharged from? [facility]   Does the patient have one of the following disease processes/diagnoses(primary or secondary)? Cardiothoracic surgery   TCM attempt successful? Yes   Discharge diagnosis NSTEMI, CABG x 4 using left leg vein harvest, atrial appendage closure, T2DM, CHF   TCM call completed? Yes   Wrap up additional comments NSTEMI, CABG x 4 using left leg vein harvest, atrial appendage closure, T2DM, CHF          Obdulia Cullen MA    1/17/2023, 14:24 EST

## 2023-01-18 ENCOUNTER — TELEPHONE (OUTPATIENT)
Dept: INTERNAL MEDICINE | Facility: CLINIC | Age: 68
End: 2023-01-18

## 2023-01-18 RX ORDER — POTASSIUM CHLORIDE 20 MEQ/1
20 TABLET, EXTENDED RELEASE ORAL DAILY
Qty: 30 TABLET | Refills: 1 | Status: SHIPPED | OUTPATIENT
Start: 2023-01-18

## 2023-01-18 RX ORDER — ATORVASTATIN CALCIUM 80 MG/1
80 TABLET, FILM COATED ORAL DAILY
Qty: 30 TABLET | Refills: 1 | Status: SHIPPED | OUTPATIENT
Start: 2023-01-18

## 2023-01-18 NOTE — TELEPHONE ENCOUNTER
[facility] patient needs refills as well as correct directions on Humalog, refills sent

## 2023-01-19 VITALS
RESPIRATION RATE: 18 BRPM | DIASTOLIC BLOOD PRESSURE: 64 MMHG | SYSTOLIC BLOOD PRESSURE: 100 MMHG | OXYGEN SATURATION: 93 % | HEART RATE: 70 BPM | TEMPERATURE: 97.2 F

## 2023-01-19 NOTE — HOME HEALTH
67M with history of MV CAD, Afib, CHF, previous MI (LAD, stent placed 2017) , DM2, and HLD.  Recent hospitalization due to NSTEMI and CABG on 1/10/23.  Lives at home with spouse and son who assist.  Ambulates without assist.  Medications reconciled.  VSS.  Incision without s/s of infection.  SN to T/I CABG and medication regime.

## 2023-01-20 ENCOUNTER — HOME CARE VISIT (OUTPATIENT)
Dept: HOME HEALTH SERVICES | Facility: HOME HEALTHCARE | Age: 68
End: 2023-01-20
Payer: MEDICARE

## 2023-01-20 VITALS
RESPIRATION RATE: 18 BRPM | DIASTOLIC BLOOD PRESSURE: 82 MMHG | TEMPERATURE: 97.7 F | SYSTOLIC BLOOD PRESSURE: 108 MMHG | HEART RATE: 90 BPM | OXYGEN SATURATION: 95 %

## 2023-01-20 PROCEDURE — G0299 HHS/HOSPICE OF RN EA 15 MIN: HCPCS

## 2023-01-22 NOTE — HOME HEALTH
Patient vitals WNL.  Weight down 2lbs from start and staying pretty consistent.  No complaints of pain.  Reviewed meds and patient states he is going to organize them later with son and spouse.  States he is walking regularly and using IS.  Incisions look great.  Continue CP assessment and post open heart education.

## 2023-01-23 ENCOUNTER — READMISSION MANAGEMENT (OUTPATIENT)
Dept: CALL CENTER | Facility: HOSPITAL | Age: 68
End: 2023-01-23

## 2023-01-23 NOTE — OUTREACH NOTE
CT Surgery Week 2 Survey    Flowsheet Row Responses   [facility] patient discharged from? [place]   Does the patient have one of the following disease processes/diagnoses(primary or secondary)? Cardiothoracic surgery   Week 2 attempt successful? Yes   Call start time 1656   Call end time 1659   Discharge diagnosis NSTEMI, CABG x 4 using left leg vein harvest, atrial appendage closure, T2DM, CHF   Meds reviewed with patient/caregiver? Yes   Is the patient having any side effects they believe may be caused by any medication additions or changes? No   Does the patient have all medications related to this admission filled (includes all antibiotics, pain medications, cardiac medications, etc.) Yes   Is the patient taking all medications as directed (includes completed medication regime)? Yes   Does the patient have a primary care provider?  Yes   Does the patient have an appointment scheduled with their C/T surgeon? Yes   Has the patient kept scheduled appointments due by today? N/A   What is the Home health agency?  [facility]   Has home health visited the patient within 72 hours of discharge? Yes   Psychosocial issues? No   Did the patient receive a copy of their discharge instructions? Yes   Nursing interventions Reviewed instructions with patient   What is the patient's perception of their health status since discharge? Improving   Nursing interventions Nurse provided patient education   Is the patient/caregiver able to teach back normal signs of recovery? Pain or discomfort at incisional site, Constipation, Nausea and lack of appetite   Nursing interventions Reassured on normal signs of recovery   Is the patient /caregiver able to teach back basic post-op care? Continue use of incentive spirometry at least 1 week post discharge, Practice cough and deep breath every 4 hours while awake, Hold pillow to support chest when coughing, Lifting as instructed by MD in discharge instructions   Is the patient/caregiver able  to teach back signs and symptoms of incisional infection? Increased redness, swelling or pain at the incisonal site, Increased drainage or bleeding, Incisional warmth, Pus or odor from incision, Fever   Is the patient/caregiver able to teach back steps to recovery at home? Set small, achievable goals for return to baseline health, Rest and rebuild strength, gradually increase activity, Eat a well-balance diet, Make a list of questions for surgeon's appointment   Is the patient /caregiver able to teach back the importance of cardiac rehab? Yes   Nursing interventions Provided education on importance of cardiac rehab   If the patient is a current smoker, are they able to teach back resources for cessation? Not a smoker   Is the patient/caregiver able to teach back the hierarchy of who to call/visit for symptoms/problems? PCP, Specialist, Home health nurse, Urgent Care, ED, 911 Yes   Additional teach back comments The leg incision stings on occasion. Both sites are healing and without issues.   Week 2 call completed? Yes   Is the patient interested in additional calls from an ambulatory ?  NOTE:  applies to high risk patients requiring additional follow-up. No   Wrap up additional comments Reasonable appetite returning, sleep is still upright.          RAYMON BUSTAMANTE - Registered Nurse

## 2023-01-24 ENCOUNTER — HOME CARE VISIT (OUTPATIENT)
Dept: HOME HEALTH SERVICES | Facility: HOME HEALTHCARE | Age: 68
End: 2023-01-24
Payer: MEDICARE

## 2023-01-24 ENCOUNTER — LAB REQUISITION (OUTPATIENT)
Dept: LAB | Facility: HOSPITAL | Age: 68
End: 2023-01-24

## 2023-01-24 VITALS
TEMPERATURE: 97.5 F | OXYGEN SATURATION: 96 % | HEART RATE: 74 BPM | DIASTOLIC BLOOD PRESSURE: 70 MMHG | RESPIRATION RATE: 18 BRPM | SYSTOLIC BLOOD PRESSURE: 104 MMHG

## 2023-01-24 DIAGNOSIS — Z00.00 ENCOUNTER FOR GENERAL ADULT MEDICAL EXAMINATION WITHOUT ABNORMAL FINDINGS: ICD-10-CM

## 2023-01-24 LAB
ANION GAP SERPL CALCULATED.3IONS-SCNC: 9.6 MMOL/L (ref 5–15)
BUN SERPL-MCNC: 23 MG/DL (ref 8–23)
BUN/CREAT SERPL: 19.5 (ref 7–25)
CALCIUM SPEC-SCNC: 9 MG/DL (ref 8.6–10.5)
CHLORIDE SERPL-SCNC: 97 MMOL/L (ref 98–107)
CO2 SERPL-SCNC: 32.4 MMOL/L (ref 22–29)
CREAT SERPL-MCNC: 1.18 MG/DL (ref 0.76–1.27)
EGFRCR SERPLBLD CKD-EPI 2021: 68.5 ML/MIN/1.73
GLUCOSE SERPL-MCNC: 138 MG/DL (ref 65–99)
POTASSIUM SERPL-SCNC: 4.6 MMOL/L (ref 3.5–5.2)
SODIUM SERPL-SCNC: 139 MMOL/L (ref 136–145)

## 2023-01-24 PROCEDURE — G0299 HHS/HOSPICE OF RN EA 15 MIN: HCPCS

## 2023-01-24 PROCEDURE — 80048 BASIC METABOLIC PNL TOTAL CA: CPT | Performed by: FAMILY MEDICINE

## 2023-01-24 NOTE — TELEPHONE ENCOUNTER
Hub staff attempted to follow warm transfer process and was unsuccessful     Caller: Joshua Wolf    Relationship to patient: Self    Best call back number: [phone]     Patient is needing: HOSPITAL FOLLOW UP   [facility] 01/17/23 [facility] - OPEN HEART SURGERY.

## 2023-01-25 NOTE — HOME HEALTH
Labs obtained and taken to [facility].  CP assessment WNL.  Incisions look good.  Patient complains of BLE edema, but reports that it is actually improved since hospital admission and weights have continued to trend down so I educated him on using compression (which he states he is not using) and elevation of feet.  Continue post open heart education.

## 2023-01-27 ENCOUNTER — HOME CARE VISIT (OUTPATIENT)
Dept: HOME HEALTH SERVICES | Facility: HOME HEALTHCARE | Age: 68
End: 2023-01-27
Payer: MEDICARE

## 2023-01-27 PROCEDURE — G0300 HHS/HOSPICE OF LPN EA 15 MIN: HCPCS

## 2023-01-28 VITALS
DIASTOLIC BLOOD PRESSURE: 60 MMHG | WEIGHT: 242.5 LBS | SYSTOLIC BLOOD PRESSURE: 112 MMHG | OXYGEN SATURATION: 97 % | RESPIRATION RATE: 18 BRPM | BODY MASS INDEX: 35.81 KG/M2 | HEART RATE: 89 BPM

## 2023-01-28 NOTE — HOME HEALTH
SNV for cp assessment and t/i disease management r/t CABG  Chest incision healed  Patient has low endurance, instructed to exercise daily and increase exercise weekly  Patient has scatttered rhonchi that do not clear when cough. Instructed to use IS

## 2023-02-01 ENCOUNTER — READMISSION MANAGEMENT (OUTPATIENT)
Dept: CALL CENTER | Facility: HOSPITAL | Age: 68
End: 2023-02-01

## 2023-02-01 NOTE — OUTREACH NOTE
CT Surgery Week 3 Survey    Flowsheet Row Responses   [facility] [place] operated by [facility] patient discharged from? [facility]   Does the patient have one of the following disease processes/diagnoses(primary or secondary)? Cardiothoracic surgery   Week 3 attempt successful? Yes   Call start time 1545   Call end time 1547   Discharge diagnosis NSTEMI, CABG x 4 using left leg vein harvest, atrial appendage closure, T2DM, CHF   Meds reviewed with patient/caregiver? Yes   Is the patient taking all medications as directed (includes completed medication regime)? Yes   Does the patient have a primary care provider?  Yes   Does the patient have an appointment scheduled with their C/T surgeon? Yes   Has the patient kept scheduled appointments due by today? N/A   Comments Surgeon appt 2/15/23   What is the Home health agency?  [facility]   Has home health visited the patient within 72 hours of discharge? Yes   Psychosocial issues? No   Did the patient receive a copy of their discharge instructions? Yes   Nursing interventions Reviewed instructions with patient   What is the patient's perception of their health status since discharge? Improving   Nursing interventions Nurse provided patient education   Is the patient /caregiver able to teach back basic post-op care? Shower daily, No tub bath, swimming, or hot tub until instructed by MD, Lifting as instructed by MD in discharge instructions   Is the patient/caregiver able to teach back signs and symptoms of incisional infection? Increased redness, swelling or pain at the incisonal site, Increased drainage or bleeding, Incisional warmth, Pus or odor from incision, Fever   Is the patient/caregiver able to teach back steps to recovery at home? Eat a well-balance diet, Set small, achievable goals for return to baseline health   Is the patient/caregiver able to teach back the hierarchy of who to call/visit for symptoms/problems? PCP, Specialist, Home health nurse, Urgent Care, ED, 911 Yes   Week 3 call  completed? Yes   Wrap up additional comments Pt reports he is doing well. no needs          YA PATRICIA - Registered Nurse

## 2023-02-03 ENCOUNTER — HOME CARE VISIT (OUTPATIENT)
Dept: HOME HEALTH SERVICES | Facility: HOME HEALTHCARE | Age: 68
End: 2023-02-03
Payer: MEDICARE

## 2023-02-03 PROCEDURE — G0300 HHS/HOSPICE OF LPN EA 15 MIN: HCPCS

## 2023-02-05 VITALS
OXYGEN SATURATION: 97 % | DIASTOLIC BLOOD PRESSURE: 80 MMHG | HEART RATE: 76 BPM | SYSTOLIC BLOOD PRESSURE: 140 MMHG | RESPIRATION RATE: 18 BRPM | TEMPERATURE: 97.8 F

## 2023-02-05 NOTE — HOME HEALTH
SNV for cp assessment and t/i disease management r/t CABG  Patient has met all skilled nurse goals and ready for discharge  Patient do not know if he will be going to Cardiac rehab and did not call to make appoitment.   Patient voiced he will discuss with his cardiac surgeon regarding cardia rehab  Nurse explained imprtance of going to cardiac rehav, i.e more physical activity, education on on nutrition and stress and factors that contribute to heart disease

## 2023-02-06 PROCEDURE — G0180 MD CERTIFICATION HHA PATIENT: HCPCS

## 2023-02-10 ENCOUNTER — HOME CARE VISIT (OUTPATIENT)
Dept: HOME HEALTH SERVICES | Facility: HOME HEALTHCARE | Age: 68
End: 2023-02-10
Payer: MEDICARE

## 2023-02-10 PROCEDURE — G0493 RN CARE EA 15 MIN HH/HOSPICE: HCPCS

## 2023-02-11 VITALS
SYSTOLIC BLOOD PRESSURE: 138 MMHG | RESPIRATION RATE: 18 BRPM | DIASTOLIC BLOOD PRESSURE: 80 MMHG | HEART RATE: 82 BPM | OXYGEN SATURATION: 97 %

## 2023-02-15 ENCOUNTER — OFFICE VISIT (OUTPATIENT)
Dept: CARDIAC SURGERY | Facility: CLINIC | Age: 68
End: 2023-02-15
Payer: MEDICARE

## 2023-02-15 VITALS
SYSTOLIC BLOOD PRESSURE: 127 MMHG | TEMPERATURE: 97.1 F | BODY MASS INDEX: 35.84 KG/M2 | OXYGEN SATURATION: 95 % | DIASTOLIC BLOOD PRESSURE: 80 MMHG | HEIGHT: 69 IN | HEART RATE: 59 BPM | WEIGHT: 242 LBS | RESPIRATION RATE: 20 BRPM

## 2023-02-15 DIAGNOSIS — Z95.1 S/P CABG X 4: ICD-10-CM

## 2023-02-15 PROCEDURE — 99024 POSTOP FOLLOW-UP VISIT: CPT | Performed by: NURSE PRACTITIONER

## 2023-02-15 NOTE — PROGRESS NOTES
"CARDIOVASCULAR SURGERY FOLLOW-UP PROGRESS NOTE  Chief Complaint: post op        HPI:   Dear Eric Mike MD and colleagues:    It was nice to see Joshua Wolf in follow up 4 weeks after surgery.  As you know, he is a 67 y.o. male with CAD who underwent CABGx4, SUDHIR occlusion on 1/10 by Dr. Velasquez. He did well postoperatively and continues to do well. He comes in today complaining of nothing.  His activity level has been good.   He is walking 1 mile a day and is eager to go back to his gym.  He states he does note he gets short of breath but it is improving as I reassured him it will continue to improve as he increases his endurance.  From a surgical standpoint he is doing well.  Incision is healing, well approximated.  He denies any popping or clicking with coughing or with deep inspiration.  He follows up with cardiology next Wednesday.     Physical Exam:         /80 (BP Location: Left arm, Patient Position: Sitting, Cuff Size: Adult)   Pulse 59   Temp 97.1 °F (36.2 °C)   Resp 20   Ht 175.3 cm (69\")   Wt 110 kg (242 lb)   SpO2 95%   BMI 35.74 kg/m²   Heart:  regular rate and rhythm  Lungs:  clear to auscultation bilaterally  Extremities:  no edema  Incision(s):  sternum stable, incisions healing    Assessment/Plan:     S/P CABG. Overall, he is doing well.    No significant post-op complications    No heavy lifting > 10 pounds for 2 more weeks, no more than 50 lbs for 3 months  OK to drive if not taking narcotic pain medicine  OK to begin cardiac rehab  Follow-up as scheduled with cardiology  Follow-up as scheduled with PCP        Thank you for allowing me to participate in the care of your   patient.  Regards,  BREANNE Patterson      "

## 2023-02-16 ENCOUNTER — OFFICE VISIT (OUTPATIENT)
Dept: INTERNAL MEDICINE | Facility: CLINIC | Age: 68
End: 2023-02-16

## 2023-02-16 VITALS
DIASTOLIC BLOOD PRESSURE: 80 MMHG | TEMPERATURE: 98 F | HEART RATE: 69 BPM | SYSTOLIC BLOOD PRESSURE: 122 MMHG | BODY MASS INDEX: 39.4 KG/M2 | OXYGEN SATURATION: 98 % | WEIGHT: 266 LBS | HEIGHT: 69 IN

## 2023-02-16 DIAGNOSIS — E78.2 MIXED HYPERLIPIDEMIA: ICD-10-CM

## 2023-02-16 DIAGNOSIS — E11.9 TYPE 2 DIABETES MELLITUS WITHOUT COMPLICATION, WITH LONG-TERM CURRENT USE OF INSULIN: ICD-10-CM

## 2023-02-16 DIAGNOSIS — Z79.4 TYPE 2 DIABETES MELLITUS WITHOUT COMPLICATION, WITH LONG-TERM CURRENT USE OF INSULIN: ICD-10-CM

## 2023-02-16 DIAGNOSIS — N18.31 STAGE 3A CHRONIC KIDNEY DISEASE: ICD-10-CM

## 2023-02-16 DIAGNOSIS — I25.10 CORONARY ARTERY DISEASE INVOLVING NATIVE CORONARY ARTERY OF NATIVE HEART WITHOUT ANGINA PECTORIS: Primary | ICD-10-CM

## 2023-02-16 PROCEDURE — 99214 OFFICE O/P EST MOD 30 MIN: CPT | Performed by: FAMILY MEDICINE

## 2023-02-16 NOTE — PROGRESS NOTES
"Chief Complaint  No chief complaint on file.    Subjective        Joshua Wolf presents to [facility]  History of Present Illness  Sebastien has had admission to the hospital in January with discharge on January 16 after CABG x4.  He is healing up without problem.  He is overall feeling better steadily.  He has had follow-up with cardiovascular surgery.  Otherwise he left the hospital on Lantus insulin 40 units in night and 10 units of rapid acting insulin with meals in addition he is still on glimepiride and also metformin.  Question moving forward is going to be whether or not to stay on insulin.  We will see him back in 3 months and see sugars are going and other consideration would be endocrinology consultation but will hold off for now given his recovery.    He has had some family stresses in the past several months which have impacted his self-care.    Otherwise new medications are reviewed as well as Xarelto plus aspirin plus amiodarone.    He had a catheter placed in the hospital with some difficulty urinating was placed on a short course of tamsulosin 0.4 mg daily and that landed fairly soon.  If he has difficulty urinating after that he will let us know.      Objective   Vital Signs:  /80 (BP Location: Right arm, Patient Position: Sitting, Cuff Size: Adult)   Pulse 69   Temp 98 °F (36.7 °C) (Infrared)   Ht 175.3 cm (69\")   Wt 121 kg (266 lb)   SpO2 98%   BMI 39.28 kg/m²   Estimated body mass index is 39.28 kg/m² as calculated from the following:    Height as of this encounter: 175.3 cm (69\").    Weight as of this encounter: 121 kg (266 lb).             Physical Exam  Vitals reviewed.   Constitutional:       Appearance: He is well-developed. He is obese.   HENT:      Head: Normocephalic and atraumatic.      Right Ear: Tympanic membrane and external ear normal.      Left Ear: Tympanic membrane and external ear normal.      Nose: Nose normal.   Eyes:      " Conjunctiva/sclera: Conjunctivae normal.      Pupils: Pupils are equal, round, and reactive to light.   Neck:      Thyroid: No thyromegaly.      Vascular: No JVD.   Cardiovascular:      Rate and Rhythm: Normal rate and regular rhythm.      Heart sounds: Normal heart sounds.   Pulmonary:      Effort: Pulmonary effort is normal.      Breath sounds: Normal breath sounds.   Abdominal:      General: Bowel sounds are normal.      Palpations: Abdomen is soft.   Musculoskeletal:         General: Normal range of motion.      Cervical back: Normal range of motion and neck supple.   Lymphadenopathy:      Cervical: No cervical adenopathy.   Skin:     General: Skin is warm and dry.      Findings: No rash.   Neurological:      Mental Status: He is alert and oriented to person, place, and time.      Cranial Nerves: No cranial nerve deficit.      Coordination: Coordination normal.   Psychiatric:         Behavior: Behavior normal.         Thought Content: Thought content normal.         Judgment: Judgment normal.        Result Review :  The following data was reviewed by: Eric Eubanks MD on 02/16/2023:  Common labs    Common Labs 1/15/23 1/16/23 1/24/23   Glucose 157 (A) 79 138 (A)   BUN 35 (A) 35 (A) 23   Creatinine 1.39 (A) 1.49 (A) 1.18   Sodium 135 (A) 137 139   Potassium 3.9 3.2 (A) 4.6   Chloride 100 98 97 (A)   Calcium 8.9 8.7 9.0   (A) Abnormal value       Comments are available for some flowsheets but are not being displayed.           Data reviewed: Recent hospitalization [facility]             Assessment and Plan   Diagnoses and all orders for this visit:    1. Coronary artery disease involving native coronary artery of native heart without angina pectoris (Primary)    2. Type 2 diabetes mellitus without complication, with long-term current use of insulin (HCC)  Comments:  Lantus 30 units in the evening plus quick acting insulin 10 units with meals 3 times daily.  Continue glimepiride and metformin for  now.    3. Mixed hyperlipidemia    4. Stage 3a chronic kidney disease (HCC)             Follow Up   No follow-ups on file.  Patient was given instructions and counseling regarding his condition or for health maintenance advice. Please see specific information pulled into the AVS if appropriate.

## 2023-02-24 DIAGNOSIS — E11.9 TYPE 2 DIABETES MELLITUS WITHOUT COMPLICATION, WITHOUT LONG-TERM CURRENT USE OF INSULIN: Primary | ICD-10-CM

## 2023-02-24 RX ORDER — SPIRONOLACTONE 25 MG/1
25 TABLET ORAL DAILY
Refills: 5 | OUTPATIENT
Start: 2023-02-24

## 2023-02-24 RX ORDER — GLIMEPIRIDE 4 MG/1
4 TABLET ORAL
Qty: 90 TABLET | Refills: 1 | Status: SHIPPED | OUTPATIENT
Start: 2023-02-24

## 2023-02-24 RX ORDER — AMIODARONE HYDROCHLORIDE 200 MG/1
200 TABLET ORAL 2 TIMES DAILY
Qty: 60 TABLET | Refills: 2 | OUTPATIENT
Start: 2023-02-24

## 2023-02-24 RX ORDER — FUROSEMIDE 40 MG/1
40 TABLET ORAL DAILY
OUTPATIENT
Start: 2023-02-24

## 2023-02-24 NOTE — TELEPHONE ENCOUNTER
Caller: Joshua Wolf    Relationship: Self    Best call back number: [phone]    Requested Prescriptions:   Requested Prescriptions     Pending Prescriptions Disp Refills   • amiodarone (PACERONE) 200 MG tablet 60 tablet 2     Sig: Take 1 tablet by mouth 2 (Two) Times a Day. Indications: Irregularity of Ventricular Heart Rhythm   • metoprolol tartrate (LOPRESSOR) 25 MG tablet 90 tablet 0     Sig: Take 1.5 tablets by mouth Every 12 (Twelve) Hours for 30 days. Indications: High Blood Pressure Disorder   • spironolactone (ALDACTONE) 25 MG tablet  5     Sig: Take 1 tablet by mouth Daily. Indications: Edema   • glimepiride (AMARYL) 4 MG tablet 90 tablet 1     Sig: Take 1 tablet by mouth Every Morning Before Breakfast. Indications: Type 2 Diabetes   • furosemide (LASIX) 40 MG tablet       Sig: Take 1 tablet by mouth Daily. Indications: Cardiac Failure        Pharmacy where request should be sent: [facility][id number] - [address] AT [facility] [phone] [facility] [phone]      Additional details provided by patient: HAS ENOUGH MEDICATION FOR TODAY     Does the patient have less than a 3 day supply:  [x] Yes  [] No    Would you like a call back once the refill request has been completed: [] Yes [] No    If the office needs to give you a call back, can they leave a voicemail: [] Yes [] No    Gregg Vogt Rep   02/24/23 09:25 EST

## 2023-03-06 RX ORDER — INSULIN GLARGINE 100 [IU]/ML
30 INJECTION, SOLUTION SUBCUTANEOUS NIGHTLY
Qty: 15 ML | Refills: 0 | Status: SHIPPED | OUTPATIENT
Start: 2023-03-06

## 2023-03-06 NOTE — TELEPHONE ENCOUNTER
Rx Refill Note  Requested Prescriptions     Pending Prescriptions Disp Refills   • Insulin Glargine (BASAGLAR KWIKPEN) 100 UNIT/ML injection pen 15 mL 0     Sig: Inject 30 Units under the skin into the appropriate area as directed Every Night. Indications: Diabetes   • Insulin Pen Needle 32G X 4 MM misc 100 each 0     Si each Take As Directed.   • glucose blood (Accu-Chek Guide) test strip 100 each 1     Sig: Use to test blood sugar up to 4 times daily      Last office visit with prescribing clinician: 2023   Last telemedicine visit with prescribing clinician: 2023   Next office visit with prescribing clinician: 2023                         Would you like a call back once the refill request has been completed: [] Yes [] No    If the office needs to give you a call back, can they leave a voicemail: [] Yes [] No    Maria D Stewart  23, 13:21 EST

## 2023-03-06 NOTE — TELEPHONE ENCOUNTER
Caller: Joshua Wolf    Relationship: Self    Best call back number: [phone] (Home)    Requested Prescriptions:   Insulin Glargine (BASAGLAR KWIKPEN) 100 UNIT/ML injection pen     glucose blood (Accu-Chek Guide) test strip    Insulin Pen Needle 32G X 4 MM [facility]    Pharmacy where request should be sent:  [facility] #[id number] [address] AT [facility] [phone] [facility] [phone]         Additional details provided by patient: PATIENT CALLED TO REQUEST A MEDICATION REFILL ON HISMEDICATION. PATIENT STATES THAT HE HAS A 3 DAY SUPPLY LEFT.            Does the patient have less than a 3 day supply:  [] Yes  [x] No    Would you like a call back once the refill request has been completed: [] Yes [] No    If the office needs to give you a call back, can they leave a voicemail: [] Yes [] No    Gregg Leyva Rep   03/06/23 13:12 EST       THANKS

## 2023-05-02 RX ORDER — INSULIN GLARGINE 100 [IU]/ML
INJECTION, SOLUTION SUBCUTANEOUS
Qty: 15 ML | Refills: 2 | Status: SHIPPED | OUTPATIENT
Start: 2023-05-02

## 2023-05-10 DIAGNOSIS — E78.2 MIXED HYPERLIPIDEMIA: Primary | ICD-10-CM

## 2023-05-10 RX ORDER — ATORVASTATIN CALCIUM 80 MG/1
80 TABLET, FILM COATED ORAL DAILY
Qty: 30 TABLET | Refills: 5 | Status: SHIPPED | OUTPATIENT
Start: 2023-05-10

## 2023-05-26 RX ORDER — PEN NEEDLE, DIABETIC 32GX 5/32"
NEEDLE, DISPOSABLE MISCELLANEOUS
Qty: 100 EACH | Refills: 0 | Status: SHIPPED | OUTPATIENT
Start: 2023-05-26

## 2023-05-26 NOTE — TELEPHONE ENCOUNTER
Rx Refill Note  Requested Prescriptions     Pending Prescriptions Disp Refills   • BD Pen Needle Heather 2nd Gen 32G X 4 MM misc [Pharmacy Med Name: B-D HEATHER 2ND GEN PEN NDL [id number]] 100 each 0     Sig: USE AS DIRECTED      Last office visit with prescribing clinician: 2/16/2023   Last telemedicine visit with prescribing clinician: Visit date not found   Next office visit with prescribing clinician: 6/29/2023     Terrie Hart MA  05/26/23, 09:39 EDT

## 2023-06-01 RX ORDER — METFORMIN HYDROCHLORIDE 500 MG/1
1000 TABLET, EXTENDED RELEASE ORAL
Qty: 180 TABLET | Refills: 1 | Status: SHIPPED | OUTPATIENT
Start: 2023-06-01

## 2023-06-22 PROBLEM — R10.11 ABDOMINAL PAIN, ACUTE, RIGHT UPPER QUADRANT: Status: ACTIVE | Noted: 2023-01-01

## 2023-06-23 PROBLEM — I48.21 PERMANENT ATRIAL FIBRILLATION: Status: ACTIVE | Noted: 2023-01-01

## 2023-06-23 PROBLEM — E80.6 HYPERBILIRUBINEMIA: Status: ACTIVE | Noted: 2023-01-01

## 2023-06-23 PROBLEM — K81.0 ACUTE CHOLECYSTITIS: Status: ACTIVE | Noted: 2023-01-01
